# Patient Record
Sex: MALE | Race: OTHER | Employment: UNEMPLOYED | URBAN - METROPOLITAN AREA
[De-identification: names, ages, dates, MRNs, and addresses within clinical notes are randomized per-mention and may not be internally consistent; named-entity substitution may affect disease eponyms.]

---

## 2017-12-22 ENCOUNTER — HOSPITAL ENCOUNTER (INPATIENT)
Age: 42
LOS: 5 days | Discharge: HOME | DRG: 047 | End: 2017-12-27
Attending: EMERGENCY MEDICINE | Admitting: INTERNAL MEDICINE
Payer: COMMERCIAL

## 2017-12-22 ENCOUNTER — APPOINTMENT (OUTPATIENT)
Dept: MRI IMAGING | Age: 42
DRG: 047 | End: 2017-12-22
Payer: COMMERCIAL

## 2017-12-22 ENCOUNTER — APPOINTMENT (OUTPATIENT)
Dept: CT IMAGING | Age: 42
DRG: 047 | End: 2017-12-22
Payer: COMMERCIAL

## 2017-12-22 ENCOUNTER — APPOINTMENT (OUTPATIENT)
Dept: GENERAL RADIOLOGY | Age: 42
DRG: 047 | End: 2017-12-22
Payer: COMMERCIAL

## 2017-12-22 DIAGNOSIS — I63.9 CEREBROVASCULAR ACCIDENT (CVA), UNSPECIFIED MECHANISM (HCC): Primary | ICD-10-CM

## 2017-12-22 PROBLEM — R29.90 STROKE-LIKE SYMPTOMS: Status: ACTIVE | Noted: 2017-12-22

## 2017-12-22 LAB
ABSOLUTE EOS #: 0.12 K/UL (ref 0–0.44)
ABSOLUTE IMMATURE GRANULOCYTE: <0.03 K/UL (ref 0–0.3)
ABSOLUTE LYMPH #: 1.92 K/UL (ref 1.1–3.7)
ABSOLUTE MONO #: 0.49 K/UL (ref 0.1–1.2)
ACETAMINOPHEN LEVEL: <10 UG/ML (ref 10–30)
AMPHETAMINE SCREEN URINE: NEGATIVE
ANION GAP SERPL CALCULATED.3IONS-SCNC: 17 MMOL/L (ref 9–17)
BARBITURATE SCREEN URINE: NEGATIVE
BASOPHILS # BLD: 0 % (ref 0–2)
BASOPHILS ABSOLUTE: 0.03 K/UL (ref 0–0.2)
BENZODIAZEPINE SCREEN, URINE: NEGATIVE
BUN BLDV-MCNC: 7 MG/DL (ref 6–20)
BUN/CREAT BLD: ABNORMAL (ref 9–20)
BUPRENORPHINE URINE: ABNORMAL
CALCIUM SERPL-MCNC: 9.3 MG/DL (ref 8.6–10.4)
CANNABINOID SCREEN URINE: POSITIVE
CHLORIDE BLD-SCNC: 98 MMOL/L (ref 98–107)
CO2: 22 MMOL/L (ref 20–31)
COCAINE METABOLITE, URINE: NEGATIVE
CREAT SERPL-MCNC: 0.82 MG/DL (ref 0.7–1.2)
DIFFERENTIAL TYPE: ABNORMAL
EKG ATRIAL RATE: 108 BPM
EKG P AXIS: 58 DEGREES
EKG P-R INTERVAL: 144 MS
EKG Q-T INTERVAL: 336 MS
EKG QRS DURATION: 78 MS
EKG QTC CALCULATION (BAZETT): 450 MS
EKG R AXIS: 60 DEGREES
EKG T AXIS: 47 DEGREES
EKG VENTRICULAR RATE: 108 BPM
EOSINOPHILS RELATIVE PERCENT: 2 % (ref 1–4)
ETHANOL PERCENT: <0.01 %
ETHANOL: <10 MG/DL
GFR AFRICAN AMERICAN: >60 ML/MIN
GFR NON-AFRICAN AMERICAN: >60 ML/MIN
GFR SERPL CREATININE-BSD FRML MDRD: ABNORMAL ML/MIN/{1.73_M2}
GFR SERPL CREATININE-BSD FRML MDRD: ABNORMAL ML/MIN/{1.73_M2}
GLUCOSE BLD-MCNC: 107 MG/DL (ref 70–99)
HCT VFR BLD CALC: 51.5 % (ref 40.7–50.3)
HEMOGLOBIN: 16.9 G/DL (ref 13–17)
IMMATURE GRANULOCYTES: 0 %
INR BLD: 0.9
LACTIC ACID, WHOLE BLOOD: 2.4 MMOL/L (ref 0.7–2.1)
LYMPHOCYTES # BLD: 28 % (ref 24–43)
MCH RBC QN AUTO: 28.7 PG (ref 25.2–33.5)
MCHC RBC AUTO-ENTMCNC: 32.8 G/DL (ref 28.4–34.8)
MCV RBC AUTO: 87.4 FL (ref 82.6–102.9)
MDMA URINE: ABNORMAL
METHADONE SCREEN, URINE: NEGATIVE
METHAMPHETAMINE, URINE: ABNORMAL
MONOCYTES # BLD: 7 % (ref 3–12)
OPIATES, URINE: NEGATIVE
OXYCODONE SCREEN URINE: NEGATIVE
PARTIAL THROMBOPLASTIN TIME: 18.3 SEC (ref 21.3–31.3)
PDW BLD-RTO: 12.4 % (ref 11.8–14.4)
PHENCYCLIDINE, URINE: NEGATIVE
PLATELET # BLD: ABNORMAL K/UL (ref 138–453)
PLATELET ESTIMATE: ABNORMAL
PLATELET, FLUORESCENCE: NORMAL K/UL (ref 138–453)
PLATELET, IMMATURE FRACTION: NORMAL % (ref 1.1–10.3)
PMV BLD AUTO: ABNORMAL FL (ref 8.1–13.5)
POTASSIUM SERPL-SCNC: 4.1 MMOL/L (ref 3.7–5.3)
PROPOXYPHENE, URINE: ABNORMAL
PROTHROMBIN TIME: 9.6 SEC (ref 9.4–12.6)
RBC # BLD: 5.89 M/UL (ref 4.21–5.77)
RBC # BLD: ABNORMAL 10*6/UL
SALICYLATE LEVEL: <1 MG/DL (ref 3–10)
SEG NEUTROPHILS: 62 % (ref 36–65)
SEGMENTED NEUTROPHILS ABSOLUTE COUNT: 4.23 K/UL (ref 1.5–8.1)
SODIUM BLD-SCNC: 137 MMOL/L (ref 135–144)
TEST INFORMATION: ABNORMAL
TOXIC TRICYCLIC SC,BLOOD: NEGATIVE
TRICYCLIC ANTIDEPRESSANTS, UR: ABNORMAL
TROPONIN INTERP: NORMAL
TROPONIN INTERP: NORMAL
TROPONIN T: <0.03 NG/ML
TROPONIN T: <0.03 NG/ML
WBC # BLD: 6.8 K/UL (ref 3.5–11.3)
WBC # BLD: ABNORMAL 10*3/UL

## 2017-12-22 PROCEDURE — 80307 DRUG TEST PRSMV CHEM ANLYZR: CPT

## 2017-12-22 PROCEDURE — 6360000004 HC RX CONTRAST MEDICATION: Performed by: EMERGENCY MEDICINE

## 2017-12-22 PROCEDURE — 70544 MR ANGIOGRAPHY HEAD W/O DYE: CPT

## 2017-12-22 PROCEDURE — 99285 EMERGENCY DEPT VISIT HI MDM: CPT

## 2017-12-22 PROCEDURE — 73130 X-RAY EXAM OF HAND: CPT

## 2017-12-22 PROCEDURE — 70551 MRI BRAIN STEM W/O DYE: CPT

## 2017-12-22 PROCEDURE — 85610 PROTHROMBIN TIME: CPT

## 2017-12-22 PROCEDURE — 70450 CT HEAD/BRAIN W/O DYE: CPT

## 2017-12-22 PROCEDURE — G0480 DRUG TEST DEF 1-7 CLASSES: HCPCS

## 2017-12-22 PROCEDURE — 99254 IP/OBS CNSLTJ NEW/EST MOD 60: CPT | Performed by: PSYCHIATRY & NEUROLOGY

## 2017-12-22 PROCEDURE — 85025 COMPLETE CBC W/AUTO DIFF WBC: CPT

## 2017-12-22 PROCEDURE — 6370000000 HC RX 637 (ALT 250 FOR IP): Performed by: EMERGENCY MEDICINE

## 2017-12-22 PROCEDURE — 93005 ELECTROCARDIOGRAM TRACING: CPT

## 2017-12-22 PROCEDURE — 70498 CT ANGIOGRAPHY NECK: CPT

## 2017-12-22 PROCEDURE — 83605 ASSAY OF LACTIC ACID: CPT

## 2017-12-22 PROCEDURE — 2580000003 HC RX 258: Performed by: EMERGENCY MEDICINE

## 2017-12-22 PROCEDURE — 6360000002 HC RX W HCPCS: Performed by: EMERGENCY MEDICINE

## 2017-12-22 PROCEDURE — 2000000003 HC NEURO ICU R&B

## 2017-12-22 PROCEDURE — 87641 MR-STAPH DNA AMP PROBE: CPT

## 2017-12-22 PROCEDURE — 80048 BASIC METABOLIC PNL TOTAL CA: CPT

## 2017-12-22 PROCEDURE — 85730 THROMBOPLASTIN TIME PARTIAL: CPT

## 2017-12-22 PROCEDURE — 70496 CT ANGIOGRAPHY HEAD: CPT

## 2017-12-22 PROCEDURE — 84484 ASSAY OF TROPONIN QUANT: CPT

## 2017-12-22 RX ORDER — 0.9 % SODIUM CHLORIDE 0.9 %
1000 INTRAVENOUS SOLUTION INTRAVENOUS ONCE
Status: COMPLETED | OUTPATIENT
Start: 2017-12-22 | End: 2017-12-22

## 2017-12-22 RX ORDER — SODIUM CHLORIDE 0.9 % (FLUSH) 0.9 %
10 SYRINGE (ML) INJECTION PRN
Status: DISCONTINUED | OUTPATIENT
Start: 2017-12-22 | End: 2017-12-27 | Stop reason: HOSPADM

## 2017-12-22 RX ORDER — SODIUM CHLORIDE 9 MG/ML
INJECTION, SOLUTION INTRAVENOUS CONTINUOUS
Status: DISCONTINUED | OUTPATIENT
Start: 2017-12-22 | End: 2017-12-24

## 2017-12-22 RX ORDER — CLOPIDOGREL 300 MG/1
300 TABLET, FILM COATED ORAL ONCE
Status: COMPLETED | OUTPATIENT
Start: 2017-12-22 | End: 2017-12-22

## 2017-12-22 RX ORDER — ASPIRIN 81 MG/1
324 TABLET, CHEWABLE ORAL ONCE
Status: COMPLETED | OUTPATIENT
Start: 2017-12-22 | End: 2017-12-22

## 2017-12-22 RX ORDER — SODIUM CHLORIDE 0.9 % (FLUSH) 0.9 %
10 SYRINGE (ML) INJECTION EVERY 12 HOURS SCHEDULED
Status: DISCONTINUED | OUTPATIENT
Start: 2017-12-22 | End: 2017-12-27 | Stop reason: HOSPADM

## 2017-12-22 RX ORDER — ACETAMINOPHEN 325 MG/1
650 TABLET ORAL EVERY 4 HOURS PRN
Status: DISCONTINUED | OUTPATIENT
Start: 2017-12-22 | End: 2017-12-27 | Stop reason: HOSPADM

## 2017-12-22 RX ORDER — ONDANSETRON 2 MG/ML
4 INJECTION INTRAMUSCULAR; INTRAVENOUS EVERY 6 HOURS PRN
Status: DISCONTINUED | OUTPATIENT
Start: 2017-12-22 | End: 2017-12-27 | Stop reason: HOSPADM

## 2017-12-22 RX ORDER — KETOROLAC TROMETHAMINE 30 MG/ML
30 INJECTION, SOLUTION INTRAMUSCULAR; INTRAVENOUS ONCE
Status: COMPLETED | OUTPATIENT
Start: 2017-12-22 | End: 2017-12-22

## 2017-12-22 RX ADMIN — CLOPIDOGREL BISULFATE 300 MG: 300 TABLET, FILM COATED ORAL at 17:41

## 2017-12-22 RX ADMIN — Medication 10 ML: at 20:32

## 2017-12-22 RX ADMIN — IOPAMIDOL 90 ML: 755 INJECTION, SOLUTION INTRAVENOUS at 16:27

## 2017-12-22 RX ADMIN — KETOROLAC TROMETHAMINE 30 MG: 30 INJECTION, SOLUTION INTRAMUSCULAR at 22:40

## 2017-12-22 RX ADMIN — ACETAMINOPHEN 650 MG: 325 TABLET ORAL at 21:09

## 2017-12-22 RX ADMIN — SODIUM CHLORIDE: 9 INJECTION, SOLUTION INTRAVENOUS at 20:31

## 2017-12-22 RX ADMIN — SODIUM CHLORIDE 1000 ML: 9 INJECTION, SOLUTION INTRAVENOUS at 16:32

## 2017-12-22 RX ADMIN — ASPIRIN 81 MG 324 MG: 81 TABLET ORAL at 17:41

## 2017-12-22 ASSESSMENT — ENCOUNTER SYMPTOMS
NAUSEA: 0
SORE THROAT: 0
SINUS PRESSURE: 0
SINUS PAIN: 0
PHOTOPHOBIA: 0
SHORTNESS OF BREATH: 0
WHEEZING: 0
DIARRHEA: 0
VOMITING: 0
STRIDOR: 0
ABDOMINAL PAIN: 0
COUGH: 0

## 2017-12-22 ASSESSMENT — PAIN SCALES - GENERAL
PAINLEVEL_OUTOF10: 10
PAINLEVEL_OUTOF10: 10

## 2017-12-22 NOTE — FLOWSHEET NOTE
12/22/17 1715   Encounter Summary   Services provided to: Patient   Referral/Consult From: Multi-disciplinary team   Support System Significant other   Continue Visiting (12/22/2017)   Complexity of Encounter High   Length of Encounter 15 minutes   Routine   Type Initial   Assessment Calm; Approachable; Anxious  (Weak)   Intervention Active listening;Explored feelings, thoughts, concerns;Sustaining presence/ Ministry of presence   Outcome Acceptance;Comfort  (Called his significant other)       [facility] [place] [address]     Emergency/Trauma Note    PATIENT NAME: Timur Kelly    Shift date: 12/22/2017  Shift day: Friday   Shift # 2    Room # 17/17   Name: Timur Kelly            Age: 36 y.o. Gender: male          [employer]: [id number] of [employer]ism: Unknown    Trauma/Incident type: Stroke Consult  Admit Date & Time: 12/22/2017  3:35 PM  TRAUMA NAME:       PATIENT/EVENT DESCRIPTION:  Timur Kelly is a 36 y.o. male who arrived on his own initiative. He was not transported on any helicopter or ambulance. Pt to be admitted to 17/17. SPIRITUAL ASSESSMENT/INTERVENTION:  Cassandra Rizvi was notified to come to ED in response to a Stroke Consult. This patient was visited, and he seemed to be confused. When  asked him if there were anyone he could contact, his response was to call a significant other named Tierney Carrillo. This person was contacted at 5:21PM, and she responded by saying that she would be arriving in about an hour. The patient seemed to be confused. When asked to give the significant other's telephone number, he had to repeat the number three times before he agreed that it was the correct telephone number. PATIENT BELONGINGS:  With patient    ANY BELONGINGS OF SIGNIFICANT VALUE NOTED:  None    REGISTRATION STAFF NOTIFIED? Yes      WHAT IS YOUR SPIRITUAL CARE PLAN FOR THIS PATIENT?:     This patient will be provided any needed follow-up.   He was informed that he could notify the ED nurses to page the Spiritual Care Staff    Electronically signed by Chaplain Jaz, on 12/22/2017 at 5:30 PM.

## 2017-12-22 NOTE — ED NOTES
Pt resting on cart, respirations are equal and nonlabored, no distress noted. Pt updated on poc and duration, continue to monitor.        200 Scot Poe RN  12/22/17 1941

## 2017-12-22 NOTE — CONSULTS
States that his RUE is numb to LT compared to the LUE. DATA:  CBC:   Lab Results   Component Value Date    WBC 6.8 12/22/2017    RBC 5.89 12/22/2017    HGB 16.9 12/22/2017    HCT 51.5 12/22/2017    MCV 87.4 12/22/2017    MCH 28.7 12/22/2017    MCHC 32.8 12/22/2017    RDW 12.4 12/22/2017    PLT See Reflexed IPF Result 12/22/2017    MPV NOT REPORTED 12/22/2017     CMP:    Lab Results   Component Value Date     12/22/2017    K 4.1 12/22/2017    CL 98 12/22/2017    CO2 22 12/22/2017    BUN 7 12/22/2017    CREATININE 0.82 12/22/2017    GFRAA >60 12/22/2017    LABGLOM >60 12/22/2017    GLUCOSE 107 12/22/2017    PROT 7.7 09/16/2013    LABALBU 4.2 09/16/2013    CALCIUM 9.3 12/22/2017    BILITOT 0.3 09/16/2013    ALKPHOS 114 09/16/2013    AST 23 09/16/2013    ALT 23 09/16/2013             IMPRESSION/RECOMMENDATIONS:      37 yo man presenting with a sense of light-headness with standing. He also endorses right hand incoordination for the past several months. CTA suggests several possible areas of abnormality including the proximal basilar where there may be a partial thrombus.     -->load with clopidogrel 300mg and aspirin 325mg  -->admit to neuro ICU  -->brain MRI to investigate for possible infarct  -->I have added a MRA brain to better characterize the basilar lesion and possible left PCA fenestration

## 2017-12-22 NOTE — ED PROVIDER NOTES
status: Current Every Day Smoker     Packs/day: 1.00     Types: Cigarettes    Smokeless tobacco: Never Used    Alcohol use No    Drug use: No    Sexual activity: Not Currently     Other Topics Concern    Not on file     Social History Narrative    No narrative on file       History reviewed. No pertinent family history. Allergies:  Review of patient's allergies indicates no known allergies. Home Medications:  Prior to Admission medications    Not on File       REVIEW OF SYSTEMS    (2-9 systems for level 4, 10 or more for level 5)      Review of Systems   Constitutional: Negative for activity change, appetite change, chills, diaphoresis, fatigue and fever. HENT: Negative for congestion, sinus pain, sinus pressure, sneezing and sore throat. Eyes: Positive for visual disturbance. Negative for photophobia. Respiratory: Negative for cough, shortness of breath, wheezing and stridor. Cardiovascular: Negative for chest pain and leg swelling. Gastrointestinal: Negative for abdominal pain, diarrhea, nausea and vomiting. Musculoskeletal: Negative for arthralgias and myalgias. Skin: Negative for rash and wound. Neurological: Positive for speech difficulty. Psychiatric/Behavioral: Negative for agitation, behavioral problems and confusion. PHYSICAL EXAM   (up to 7 for level 4, 8 or more for level 5)      INITIAL VITALS:   BP (!) 152/98   Pulse 111   Temp 98.7 °F (37.1 °C) (Oral)   Resp 18   SpO2 100%     Physical Exam   Constitutional: He is oriented to person, place, and time. He appears well-developed and well-nourished. No distress. HENT:   Head: Normocephalic and atraumatic. Eyes: Conjunctivae and EOM are normal. Pupils are equal, round, and reactive to light. Neck: Neck supple. Cardiovascular: Regular rhythm and normal heart sounds. Tachycardia present. Pulmonary/Chest: Effort normal and breath sounds normal. No respiratory distress. He has no wheezes. He has no rales. Abdominal: Soft. He exhibits no distension. There is no tenderness. There is no rebound and no guarding. Neurological: He is alert and oriented to person, place, and time. He has normal strength. No cranial nerve deficit or sensory deficit. Coordination normal. GCS eye subscore is 4. GCS verbal subscore is 5. GCS motor subscore is 6. Patient has no obvious focal neuro deficits with intact perception of the lower and upper extremities. Patient has intact range of motion in all 4 sugars. Patient's strength is 4-4 normal 4 extremities. There is no obvious sensory deficit. Patient has good midline tongue protrusion, good extraocular motion, strong shoulder shrug bilaterally, patient has intact sensation throughout the face. Hearing is grossly intact bilaterally no focal neuro deficits. Patient speech does not appear to be slurred on my exam.   Skin: He is not diaphoretic.        DIFFERENTIAL  DIAGNOSIS     PLAN (LABS / IMAGING / EKG):  Orders Placed This Encounter   Procedures    CT Head WO Contrast    CTA HEAD W CONTRAST    CTA NECK W CONTRAST    MRI Brain WO Contrast    MRA HEAD W CONTRAST    CBC WITH AUTO DIFFERENTIAL    BASIC METABOLIC PANEL    Protime-INR    APTT    Lactic Acid, Whole Blood    TOX SCR, BLD, ED    Urine Drug Screen    Troponin    Immature Platelet Fraction    Troponin    Inpatient consult to Neurology    EKG 12 Lead    PATIENT STATUS (FROM ED OR OR/PROCEDURAL) Inpatient       MEDICATIONS ORDERED:  Orders Placed This Encounter   Medications    0.9 % sodium chloride bolus    iopamidol (ISOVUE-370) 76 % injection 90 mL    clopidogrel (PLAVIX) tablet 300 mg    aspirin chewable tablet 324 mg       DDX: CVA, TIA,     DIAGNOSTIC RESULTS / EMERGENCY DEPARTMENT COURSE / MDM     LABS:  Results for orders placed or performed during the hospital encounter of 12/22/17   CBC WITH AUTO DIFFERENTIAL   Result Value Ref Range    WBC 6.8 3.5 - 11.3 k/uL    RBC 5.89 (H) 4.21 - 5.77 m/uL Immature Fraction NOT REPORTED 1.1 - 10.3 %    Platelet, Fluorescence Platelet clumps present, count appears adequate. 138 - 453 k/uL   Troponin   Result Value Ref Range    Troponin T <0.03 <0.03 ng/mL    Troponin Interp             IMPRESSION: lactic acidosis    RADIOLOGY:    Ct Head Wo Contrast    Result Date: 12/22/2017  EXAMINATION: CT OF THE HEAD WITHOUT CONTRAST  12/22/2017 4:27 pm TECHNIQUE: CT of the head was performed without the administration of intravenous contrast. Dose modulation, iterative reconstruction, and/or weight based adjustment of the mA/kV was utilized to reduce the radiation dose to as low as reasonably achievable. COMPARISON: January 12, 2008. HISTORY: ORDERING SYSTEM PROVIDED HISTORY: CVA FINDINGS: BRAIN/VENTRICLES: There are several well of circumscribed small low attenuation lesions in the left caudate nucleus extending posteriorly superiorly in the centrum semiovale with dilatation of the ipsilateral ventricle especially of the frontal horn, could have been sequela of remote embolic disease. There is an ill-defined hypodensity in the left midbrain which could represent in hands evolving lacunar infarction, further evaluation with MRI would be beneficial. There is no acute intracranial hemorrhage, mass effect or midline shift. No abnormal extra-axial fluid collection. The gray-white differentiation is maintained without evidence of an acute infarct. There is no evidence of hydrocephalus. ORBITS: The visualized portion of the orbits demonstrate no acute abnormality. SINUSES:  The mastoid air cells demonstrate no acute abnormality. Deviation of bony nasal septal noted to the right with bilateral jack bullosa larger on the left side. Minimal mucoperiosteal disease of maxillary sinuses as well as anterior ethmoidal cells noted likely from chronic sinus disease. The frontal sinuses are non pneumatized. The sphenoidal sinuses are clear.  SOFT TISSUES/SKULL:  No acute abnormality of the visualized skull or soft tissues. Multiple well-circumscribed small low-attenuation lesions in the left caudate nucleus extending posterior superiorly to the centrum semi ovale with dilatation of the ipsilateral ventricle, this likely a sequela of remote embolic disease. In addition, there is slightly ill-defined hypodensity in the left midbrain which could potentially represent a new or evolving lacunar infarction, further evaluation with MRI would help in better characterization. Cta Neck W Contrast    Result Date: 12/22/2017  EXAMINATION: CTA OF THE HEAD WITH CONTRAST; CTA OF THE NECK  12/22/2017 4:40 pm: TECHNIQUE: CTA of the head/brain was performed with the administration of intravenous contrast. Multiplanar reformatted images are provided for review. MIP images are provided for review. Dose modulation, iterative reconstruction, and/or weight based adjustment of the mA/kV was utilized to reduce the radiation dose to as low as reasonably achievable.; CTA of the neck was performed with the administration of intravenous contrast. Multiplanar reformatted images are provided for review. MIP images are provided for review. Stenosis of the internal carotid arteries measured using NASCET criteria. Dose modulation, iterative reconstruction, and/or weight based adjustment of the mA/kV was utilized to reduce the radiation dose to as low as reasonably achievable. COMPARISON: CT of the head from today HISTORY: ORDERING SYSTEM PROVIDED HISTORY: CVA FINDINGS: ANTERIOR CIRCULATION: The internal carotid arteries are normal in course and caliber without focal stenosis. The anterior cerebral and middle cerebral arteries demonstrate no focal stenosis. POSTERIOR CIRCULATION: There appears there appears to be a filling defect and severe narrowing of the proximal basilar artery.   The distal vertebral arteries appear normal.  The proximal superior cerebellar arteries are normal.  The posterior cerebral arteries are normal. TO:  No follow-up provider specified.     DISCHARGE MEDICATIONS:  New Prescriptions    No medications on file       Randall Ulrich MD  Emergency Medicine Resident    (Please note that portions of this note were completed with a voice recognition program.  Efforts were made to edit the dictations but occasionally words are mis-transcribed.)       Randall Ulrich MD  Resident  12/22/17 3067

## 2017-12-22 NOTE — ED NOTES
Pt has loss of IV access in CT. CT technician Removed old IV and placed a new one.       [address] Scot Poe RN  12/22/17 2194

## 2017-12-22 NOTE — ED PROVIDER NOTES
[facility]     Emergency Department     Faculty Attestation    I performed a history and physical examination of the patient and discussed management with the resident. I reviewed the residents note and agree with the documented findings and plan of care. Any areas of disagreement are noted on the chart. I was personally present for the key portions of any procedures. I have documented in the chart those procedures where I was not present during the key portions. I have reviewed the emergency nurses triage note. I agree with the chief complaint, past medical history, past surgical history, allergies, medications, social and family history as documented unless otherwise noted below. For Physician Assistant/ Nurse Practitioner cases/documentation I have personally evaluated this patient and have completed at least one if not all key elements of the E/M (history, physical exam, and MDM). Additional findings are as noted. Neuro intact this time, possible TIA earlier today and several months ago.       Heart regular rate and rhythmEKG Interpretation    Interpreted by me    Rhythm: normal sinus   Rate: 108  Axis: normal  Ectopy: none  Conduction: normal  ST Segments: no acute change  T Waves: no acute change  Q Waves: none    Clinical Impression: no acute changes and normal EKG Except for sinus tachycardia    Celestino Severino MD  Attending Emergency  Physician              Anju Hamlin MD  12/22/17 6251

## 2017-12-22 NOTE — H&P
Neuro ICU History & Physical    Patient Name: Daniel Villalba  Patient : 1975  Room/Bed:   Allergies: No Known Allergies  Problem List:   Patient Active Problem List   Diagnosis    Stroke-like symptoms       CHIEF COMPLAINT     Dizziness and stroke-like symptoms     HPI    History Obtained From: Patient and EMR    The patient is a 36 y.o. male presented with complaints of difficulty speaking with some slurred speech and nonspecific vision changes the beginning this morning after waking up. Patient states that he went to bed last night around 10:50 PM had no symptoms at that time. Patient states that the vision changes or intermittent episodes the dysarthria/typically speaking. Patient's past medical history is significant for CVA approximately 3-4 months ago, however patient states that he never went to the hospital to get evaluated. Patient states that since that time he has had difficulty with right upper extremity fine motor movements. He currently denies any symptoms including no sensory or motor changes/wobbliness. Patient also denies any recent fevers/chills, nausea/vomiting difficulty breathing/chest pain, abdominal pain or any  symptoms. He does have a significant past medical history of hepatitis and per significant other does have a history of substance abuse. Admitted to ICU From: ED  Reason for ICU Admission: Possible Intervention pending MRI       PATIENT HISTORY   Past Medical History:    History reviewed. No pertinent past medical history. Past Surgical History:    History reviewed. No pertinent surgical history. Social History:   Social History     Social History    Marital status:      Spouse name: N/A    Number of children: N/A    Years of education: N/A     Occupational History    Not on file.      Social History Main Topics    Smoking status: Current Every Day Smoker     Packs/day: 1.00     Types: Cigarettes    Smokeless tobacco: Never Used   [place] Alcohol use No    Drug use: No    Sexual activity: Not Currently     Other Topics Concern    Not on file     Social History Narrative    No narrative on file       Family History:   History reviewed. No pertinent family history. Allergies:    Review of patient's allergies indicates no known allergies. Medications Prior to Admission:    Not in a hospital admission. Current Medications:  No current facility-administered medications for this encounter. REVIEW OF SYSTEMS     CONSTITUTIONAL: negative for fatigue and malaise   EYES: positive for double vision and photophobia    HEENT: negative for tinnitus and sore throat   RESPIRATORY: negative for cough, shortness of breath   CARDIOVASCULAR: negative for chest pain, palpitations, or syncope   GASTROINTESTINAL: negative for abdominal pain, nausea, vomiting, diarrhea, or constipation    GENITOURINARY: negative for incontinence or retention    MUSCULOSKELETAL: negative for neck or back pain, negative for extremity pain   NEUROLOGICAL: Negative for seizures, headaches, +weakness, numbness, confusion, aphasia, + dysarthria    PSYCHIATRIC: negative for agitation, hallucination, SI/HI   SKIN Negative for spontaneous contusions, rashes, or lesions      PHYSICAL EXAM:     BP (!) 126/98   Pulse 111   Temp 98.7 °F (37.1 °C) (Oral)   Resp 18   SpO2 97%       PHYSICAL EXAM:  CONSTITUTIONAL:  Well developed, well nourished, alert and oriented x 3, in no acute distress. GCS 15. Nontoxic. No dysarthria. No aphasia.    HEAD:  normocephalic, atraumatic    EYES:  PERRLA, EOMI.   ENT:  moist mucous membranes   LUNGS:  Equal air entry bilaterally   CARDIOVASCULAR:  normal s1 / s2   ABDOMEN:  Soft, no rigidity   NECK supple, symmetric, no midline tenderness to palpation    BACK without midline tenderness, step-offs or deformities    EXTREMITIES Normal ROM with no deformities   NEUROLOGIC:  Mental Status:  A & O x3,awake             Cranial Nerves:    cranial nerves II-XII Neck W Contrast    Result Date: 12/22/2017  EXAMINATION: CTA OF THE HEAD WITH CONTRAST; CTA OF THE NECK  12/22/2017 4:40 pm: TECHNIQUE: CTA of the head/brain was performed with the administration of intravenous contrast. Multiplanar reformatted images are provided for review. MIP images are provided for review. Dose modulation, iterative reconstruction, and/or weight based adjustment of the mA/kV was utilized to reduce the radiation dose to as low as reasonably achievable.; CTA of the neck was performed with the administration of intravenous contrast. Multiplanar reformatted images are provided for review. MIP images are provided for review. Stenosis of the internal carotid arteries measured using NASCET criteria. Dose modulation, iterative reconstruction, and/or weight based adjustment of the mA/kV was utilized to reduce the radiation dose to as low as reasonably achievable. COMPARISON: CT of the head from today HISTORY: ORDERING SYSTEM PROVIDED HISTORY: CVA FINDINGS: ANTERIOR CIRCULATION: The internal carotid arteries are normal in course and caliber without focal stenosis. The anterior cerebral and middle cerebral arteries demonstrate no focal stenosis. POSTERIOR CIRCULATION: There appears there appears to be a filling defect and severe narrowing of the proximal basilar artery. The distal vertebral arteries appear normal.  The proximal superior cerebellar arteries are normal.  The posterior cerebral arteries are normal.  In the posterior communicating arteries are present bilaterally. ANEURYSM: No intracranial aneurysm is seen. BRAIN: No mass effect or midline shift. No abnormal extra-axial fluid collection. The gray-white differentiation appears grossly maintained. Possible left pontine infarct appears less conspicuous. Old lacunar infarction noted in the basal ganglia on the left. Incidental note is made of a bovine arch. The origins of the great vessels are otherwise unremarkable.   There is plaque noted PROVIDED HISTORY: CVA FINDINGS: ANTERIOR CIRCULATION: The internal carotid arteries are normal in course and caliber without focal stenosis. The anterior cerebral and middle cerebral arteries demonstrate no focal stenosis. POSTERIOR CIRCULATION: There appears there appears to be a filling defect and severe narrowing of the proximal basilar artery. The distal vertebral arteries appear normal.  The proximal superior cerebellar arteries are normal.  The posterior cerebral arteries are normal.  In the posterior communicating arteries are present bilaterally. ANEURYSM: No intracranial aneurysm is seen. BRAIN: No mass effect or midline shift. No abnormal extra-axial fluid collection. The gray-white differentiation appears grossly maintained. Possible left pontine infarct appears less conspicuous. Old lacunar infarction noted in the basal ganglia on the left. Incidental note is made of a bovine arch. The origins of the great vessels are otherwise unremarkable. There is plaque noted along the aortic arch. There is a small amount of mixed plaque at the carotid bulb on the left with no significant stenosis. The common, external, and internal carotid arteries are otherwise normal in course and caliber bilaterally. There is small amount of focal plaque at the origin of the right vertebral artery with possible moderate stenosis. The vertebral arteries otherwise appear normal in course and caliber bilaterally. 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 Probable acute infarct left vern brian. Possible moderate stenosis at the origin of the right vertebral artery due to mixed plaque. RECOMMENDATIONS: Sensitivity is limited without curved multi planar and 3D reformats. An addendum will be performed when these are available. The findings were sent to the Radiology Results [address] at 4:58 pm on 12/22/2017to be communicated to a licensed caregiver.   Case discussed with the emergency

## 2017-12-23 PROBLEM — Z86.73 HX OF TIA (TRANSIENT ISCHEMIC ATTACK) AND STROKE: Status: ACTIVE | Noted: 2017-12-23

## 2017-12-23 PROBLEM — E78.5 HYPERLIPIDEMIA: Status: ACTIVE | Noted: 2017-12-23

## 2017-12-23 PROBLEM — F12.10 MARIJUANA ABUSE: Status: ACTIVE | Noted: 2017-12-23

## 2017-12-23 LAB
ABSOLUTE EOS #: 0.17 K/UL (ref 0–0.44)
ABSOLUTE IMMATURE GRANULOCYTE: <0.03 K/UL (ref 0–0.3)
ABSOLUTE LYMPH #: 1.43 K/UL (ref 1.1–3.7)
ABSOLUTE MONO #: 0.42 K/UL (ref 0.1–1.2)
ANION GAP SERPL CALCULATED.3IONS-SCNC: 15 MMOL/L (ref 9–17)
BASOPHILS # BLD: 1 % (ref 0–2)
BASOPHILS ABSOLUTE: 0.04 K/UL (ref 0–0.2)
BUN BLDV-MCNC: 7 MG/DL (ref 6–20)
BUN/CREAT BLD: ABNORMAL (ref 9–20)
CALCIUM SERPL-MCNC: 8.3 MG/DL (ref 8.6–10.4)
CHLORIDE BLD-SCNC: 107 MMOL/L (ref 98–107)
CHOLESTEROL/HDL RATIO: 7.7
CHOLESTEROL: 207 MG/DL
CO2: 19 MMOL/L (ref 20–31)
CREAT SERPL-MCNC: 0.83 MG/DL (ref 0.7–1.2)
DIFFERENTIAL TYPE: ABNORMAL
EOSINOPHILS RELATIVE PERCENT: 2 % (ref 1–4)
GFR AFRICAN AMERICAN: >60 ML/MIN
GFR NON-AFRICAN AMERICAN: >60 ML/MIN
GFR SERPL CREATININE-BSD FRML MDRD: ABNORMAL ML/MIN/{1.73_M2}
GFR SERPL CREATININE-BSD FRML MDRD: ABNORMAL ML/MIN/{1.73_M2}
GLUCOSE BLD-MCNC: 164 MG/DL (ref 70–99)
HCT VFR BLD CALC: 44.9 % (ref 40.7–50.3)
HDLC SERPL-MCNC: 27 MG/DL
HEMOGLOBIN: 14.5 G/DL (ref 13–17)
IMMATURE GRANULOCYTES: 0 %
LACTIC ACID, WHOLE BLOOD: 3 MMOL/L (ref 0.7–2.1)
LDL CHOLESTEROL: 158 MG/DL (ref 0–130)
LYMPHOCYTES # BLD: 17 % (ref 24–43)
MCH RBC QN AUTO: 28.9 PG (ref 25.2–33.5)
MCHC RBC AUTO-ENTMCNC: 32.3 G/DL (ref 28.4–34.8)
MCV RBC AUTO: 89.4 FL (ref 82.6–102.9)
MONOCYTES # BLD: 5 % (ref 3–12)
MRSA, DNA, NASAL: NORMAL
PDW BLD-RTO: 12.4 % (ref 11.8–14.4)
PLATELET # BLD: 189 K/UL (ref 138–453)
PLATELET ESTIMATE: ABNORMAL
PMV BLD AUTO: 9.1 FL (ref 8.1–13.5)
POTASSIUM SERPL-SCNC: 3.7 MMOL/L (ref 3.7–5.3)
RBC # BLD: 5.02 M/UL (ref 4.21–5.77)
RBC # BLD: ABNORMAL 10*6/UL
SEG NEUTROPHILS: 75 % (ref 36–65)
SEGMENTED NEUTROPHILS ABSOLUTE COUNT: 6.48 K/UL (ref 1.5–8.1)
SODIUM BLD-SCNC: 141 MMOL/L (ref 135–144)
SPECIMEN DESCRIPTION: NORMAL
TRIGL SERPL-MCNC: 110 MG/DL
VLDLC SERPL CALC-MCNC: ABNORMAL MG/DL (ref 1–30)
WBC # BLD: 8.6 K/UL (ref 3.5–11.3)
WBC # BLD: ABNORMAL 10*3/UL

## 2017-12-23 PROCEDURE — 86147 CARDIOLIPIN ANTIBODY EA IG: CPT

## 2017-12-23 PROCEDURE — 94762 N-INVAS EAR/PLS OXIMTRY CONT: CPT

## 2017-12-23 PROCEDURE — 85025 COMPLETE CBC W/AUTO DIFF WBC: CPT

## 2017-12-23 PROCEDURE — 36415 COLL VENOUS BLD VENIPUNCTURE: CPT

## 2017-12-23 PROCEDURE — 83605 ASSAY OF LACTIC ACID: CPT

## 2017-12-23 PROCEDURE — 6370000000 HC RX 637 (ALT 250 FOR IP): Performed by: EMERGENCY MEDICINE

## 2017-12-23 PROCEDURE — 6360000002 HC RX W HCPCS: Performed by: INTERNAL MEDICINE

## 2017-12-23 PROCEDURE — 6370000000 HC RX 637 (ALT 250 FOR IP): Performed by: STUDENT IN AN ORGANIZED HEALTH CARE EDUCATION/TRAINING PROGRAM

## 2017-12-23 PROCEDURE — 80061 LIPID PANEL: CPT

## 2017-12-23 PROCEDURE — 2060000000 HC ICU INTERMEDIATE R&B

## 2017-12-23 PROCEDURE — 83036 HEMOGLOBIN GLYCOSYLATED A1C: CPT

## 2017-12-23 PROCEDURE — 85302 CLOT INHIBIT PROT C ANTIGEN: CPT

## 2017-12-23 PROCEDURE — 81241 F5 GENE: CPT

## 2017-12-23 PROCEDURE — 99233 SBSQ HOSP IP/OBS HIGH 50: CPT | Performed by: NEUROLOGICAL SURGERY

## 2017-12-23 PROCEDURE — 86146 BETA-2 GLYCOPROTEIN ANTIBODY: CPT

## 2017-12-23 PROCEDURE — 80048 BASIC METABOLIC PNL TOTAL CA: CPT

## 2017-12-23 PROCEDURE — 85305 CLOT INHIBIT PROT S TOTAL: CPT

## 2017-12-23 PROCEDURE — 6370000000 HC RX 637 (ALT 250 FOR IP): Performed by: NURSE PRACTITIONER

## 2017-12-23 PROCEDURE — 99233 SBSQ HOSP IP/OBS HIGH 50: CPT | Performed by: INTERNAL MEDICINE

## 2017-12-23 PROCEDURE — 2580000003 HC RX 258: Performed by: EMERGENCY MEDICINE

## 2017-12-23 PROCEDURE — 99255 IP/OBS CONSLTJ NEW/EST HI 80: CPT | Performed by: PSYCHIATRY & NEUROLOGY

## 2017-12-23 PROCEDURE — 81240 F2 GENE: CPT

## 2017-12-23 RX ORDER — MORPHINE SULFATE 2 MG/ML
1 INJECTION, SOLUTION INTRAMUSCULAR; INTRAVENOUS ONCE
Status: COMPLETED | OUTPATIENT
Start: 2017-12-23 | End: 2017-12-23

## 2017-12-23 RX ORDER — MORPHINE SULFATE 2 MG/ML
1 INJECTION, SOLUTION INTRAMUSCULAR; INTRAVENOUS EVERY 4 HOURS PRN
Status: DISCONTINUED | OUTPATIENT
Start: 2017-12-23 | End: 2017-12-23

## 2017-12-23 RX ORDER — CLOPIDOGREL BISULFATE 75 MG/1
75 TABLET ORAL DAILY
Status: DISCONTINUED | OUTPATIENT
Start: 2017-12-23 | End: 2017-12-27 | Stop reason: HOSPADM

## 2017-12-23 RX ORDER — ATORVASTATIN CALCIUM 80 MG/1
80 TABLET, FILM COATED ORAL NIGHTLY
Status: DISCONTINUED | OUTPATIENT
Start: 2017-12-23 | End: 2017-12-27 | Stop reason: HOSPADM

## 2017-12-23 RX ORDER — ATORVASTATIN CALCIUM 40 MG/1
40 TABLET, FILM COATED ORAL NIGHTLY
Status: DISCONTINUED | OUTPATIENT
Start: 2017-12-23 | End: 2017-12-23

## 2017-12-23 RX ORDER — ASPIRIN 81 MG/1
81 TABLET, CHEWABLE ORAL DAILY
Status: DISCONTINUED | OUTPATIENT
Start: 2017-12-23 | End: 2017-12-27 | Stop reason: HOSPADM

## 2017-12-23 RX ADMIN — ASPIRIN 81 MG 81 MG: 81 TABLET ORAL at 16:18

## 2017-12-23 RX ADMIN — Medication 10 ML: at 21:01

## 2017-12-23 RX ADMIN — MORPHINE SULFATE 1 MG: 2 INJECTION, SOLUTION INTRAMUSCULAR; INTRAVENOUS at 19:12

## 2017-12-23 RX ADMIN — ACETAMINOPHEN 650 MG: 325 TABLET ORAL at 22:02

## 2017-12-23 RX ADMIN — ACETAMINOPHEN 650 MG: 325 TABLET ORAL at 16:20

## 2017-12-23 RX ADMIN — Medication 10 ML: at 09:13

## 2017-12-23 RX ADMIN — CLOPIDOGREL 75 MG: 75 TABLET, FILM COATED ORAL at 16:45

## 2017-12-23 RX ADMIN — ATORVASTATIN CALCIUM 80 MG: 80 TABLET, FILM COATED ORAL at 21:01

## 2017-12-23 ASSESSMENT — PAIN SCALES - GENERAL
PAINLEVEL_OUTOF10: 8
PAINLEVEL_OUTOF10: 0
PAINLEVEL_OUTOF10: 10
PAINLEVEL_OUTOF10: 6

## 2017-12-23 NOTE — CONSULTS
NEUROLOGY INPATIENT CONSULT NOTE    12/23/2017         Moraima Au is a  36 y.o. male admitted on 12/22/2017 with  Stroke-like symptoms [R29.90]      History is obtained mostly from the patient and the medical record and from the caregivers. Chart is reviewed and patient is examined. Briefly, this is a  36 y.o. male admitted on 12/22/2017 with complaints of difficulty speaking with some slurred speech and nonspecific vision changes the beginning this morning after waking up. Patient states that he went to bed last night around 10:50 PM had no symptoms at that time. Patient states he has intermittent vision changes and intermittent episodes of dysarthria / trouble speaking. He currently denies any symptoms including no sensory or motor changes/wobbliness. Patient's past medical history is significant for self-reported possible stroke approximately 3-4 months ago, however patient states that he never went to the hospital to get evaluated. Patient states that since that time he has had difficulty with right upper extremity fine motor movements. He states that for the past several months he has had coordination problems with his right hand, unable to write his name. He states that he is currently on no medication and denies illicit drug use, although his cousin who is beside suspects he has recently been using heroin. Patient was loaded with 300 mg clopidogrel and aspirin 325 mg. Although CT scan suggested acute pontine infarction, this was not borne out by brain MRI which described only subtle T2 lengthening in left vern-brian which may represent chronic ischemia. 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   He does have a significant past medical history of hepatitis and per significant other does have a history of substance abuse.  Patient also denies any recent fevers/chills, nausea/vomiting difficulty breathing/chest pain, abdominal pain or any  symptoms. LDL is pending. Random blood glucose borderline elevated; A1c pending. Urine tox screen positive for cannabinoids. Alcohol level less than 10. EKG sinus tachycardia rate 108 with no acute ST-T changes.            No current facility-administered medications on file prior to encounter. No current outpatient prescriptions on file prior to encounter. Allergies: Moraima Au has No Known Allergies. History reviewed. No pertinent past medical history. History reviewed. No pertinent surgical history. Social History: Moraima Au  reports that he has been smoking Cigarettes. He has been smoking about 1.00 pack per day. He has never used smokeless tobacco. He reports that he does not drink alcohol or use drugs. History reviewed. No pertinent family history. Current Medications:     sodium chloride flush  10 mL Intravenous 2 times per day     PRN Meds include: sodium chloride flush, acetaminophen, magnesium hydroxide, ondansetron    ROS:   Constitutional Negative for fever and chills   HEENT Negative for ear discharge, ear pain, nosebleed   Eyes Negative for photophobia, pain and discharge   Respiratory Negative for hemoptysis and sputum   Cardiovascular Negative for orthopnea, claudication and PND   Gastrointestinal Negative for abdominal pain, diarrhea, blood in stool   Musculoskeletal Negative for joint pain, negative for myalgia   Skin Negative for rash or itching   Endo/heme/allergies Negative for polydipsia, environmental allergy   Psychiatric Negative for suicidal ideation.   Patient is not anxious           Objective:   BP (!) 135/116   Pulse 71   Temp 98 °F (36.7 °C)   Resp 18   Ht 5' 8\" (1.727 m)   Wt 162 lb 4.1 oz (73.6 kg)   SpO2 99%   BMI 24.67 kg/m²     Blood pressure range: Systolic (12PSE), QDK:565 , Min:101 , VDD:280   ; Diastolic (95FMA), QUW:52, Min:52, Reason for exam:->HAND PAIN FINDINGS: There is no evidence of acute fracture. There is normal alignment. No acute joint abnormality. No focal osseous lesion. No focal soft tissue abnormality. No acute osseous abnormality. Nonspecific soft tissue calcification along the ulnar aspect of the hand at the level of the carpal bones. Ct Head Wo Contrast    Result Date: 12/22/2017  EXAMINATION: CT OF THE HEAD WITHOUT CONTRAST  12/22/2017 4:27 pm TECHNIQUE: CT of the head was performed without the administration of intravenous contrast. Dose modulation, iterative reconstruction, and/or weight based adjustment of the mA/kV was utilized to reduce the radiation dose to as low as reasonably achievable. COMPARISON: January 12, 2008. HISTORY: ORDERING SYSTEM PROVIDED HISTORY: CVA FINDINGS: BRAIN/VENTRICLES: There are several well of circumscribed small low attenuation lesions in the left caudate nucleus extending posteriorly superiorly in the centrum semiovale with dilatation of the ipsilateral ventricle especially of the frontal horn, could have been sequela of remote embolic disease. There is an ill-defined hypodensity in the left midbrain which could represent in hands evolving lacunar infarction, further evaluation with MRI would be beneficial. There is no acute intracranial hemorrhage, mass effect or midline shift. No abnormal extra-axial fluid collection. The gray-white differentiation is maintained without evidence of an acute infarct. There is no evidence of hydrocephalus. ORBITS: The visualized portion of the orbits demonstrate no acute abnormality. SINUSES:  The mastoid air cells demonstrate no acute abnormality. Deviation of bony nasal septal noted to the right with bilateral jack bullosa larger on the left side. Minimal mucoperiosteal disease of maxillary sinuses as well as anterior ethmoidal cells noted likely from chronic sinus disease. The frontal sinuses are non pneumatized.   The sphenoidal sinuses are clear. SOFT TISSUES/SKULL:  No acute abnormality of the visualized skull or soft tissues. Multiple well-circumscribed small low-attenuation lesions in the left caudate nucleus extending posterior superiorly to the centrum semi ovale with dilatation of the ipsilateral ventricle, this likely a sequela of remote embolic disease. In addition, there is slightly ill-defined hypodensity in the left midbrain which could potentially represent a new or evolving lacunar infarction, further evaluation with MRI would help in better characterization. Mra Head Wo Contrast    Result Date: 12/22/2017  EXAMINATION: MRA OF THE HEAD WITHOUT CONTRAST  12/22/2017 7:06 pm TECHNIQUE: MRA of the head was performed utilizing time-of-flight imaging with MIP images. No intravenous contrast was administered. Motion limited exam. COMPARISON: CTA of the head December 22, 2017 HISTORY: ORDERING SYSTEM PROVIDED HISTORY: stroke symptoms FINDINGS: ANTERIOR CIRCULATION: The internal carotid arteries are grossly normal in course and caliber without focal stenosis. The anterior cerebral and middle cerebral arteries demonstrate no focal stenosis. POSTERIOR CIRCULATION: The posterior cerebral arteries demonstrate no severe focal stenosis. The vertebral and basilar arteries appear grossly unremarkable. ANEURYSM: No large intracranial aneurysm is seen. Severely motion limited exam limits sensitivity. Previously noted thrombosis of the left vertebral artery and basilar artery appear less conspicuous than on CTA. Result Date: 12/22/2017  EXAMINATION: CTA OF THE HEAD WITH CONTRAST; CTA OF THE NECK  12/22/2017 4:40 pm: TECHNIQUE: CTA of the head/brain was performed with the administration of intravenous contrast. Multiplanar reformatted images are provided for review. MIP images are provided for review.  Dose modulation, iterative reconstruction, and/or weight based adjustment of the mA/kV was utilized to reduce the radiation dose to as low as reasonably achievable.; CTA of the neck was performed with the administration of intravenous contrast. Multiplanar reformatted images are provided for review. MIP images are provided for review. Stenosis of the internal carotid arteries measured using NASCET criteria. Dose modulation, iterative reconstruction, and/or weight based adjustment of the mA/kV was utilized to reduce the radiation dose to as low as reasonably achievable. COMPARISON: CT of the head from today HISTORY: ORDERING SYSTEM PROVIDED HISTORY: CVA FINDINGS: ANTERIOR CIRCULATION: The internal carotid arteries are normal in course and caliber without focal stenosis. The anterior cerebral and middle cerebral arteries demonstrate no focal stenosis. POSTERIOR CIRCULATION: There appears there appears to be a filling defect and severe narrowing of the proximal basilar artery. The distal vertebral arteries appear normal.  The proximal superior cerebellar arteries are normal.  The posterior cerebral arteries are normal.  In the posterior communicating arteries are present bilaterally. ANEURYSM: No intracranial aneurysm is seen. BRAIN: No mass effect or midline shift. No abnormal extra-axial fluid collection. The gray-white differentiation appears grossly maintained. Possible left pontine infarct appears less conspicuous. Old lacunar infarction noted in the basal ganglia on the left. Incidental note is made of a bovine arch. The origins of the great vessels are otherwise unremarkable. There is plaque noted along the aortic arch. There is a small amount of mixed plaque at the carotid bulb on the left with no significant stenosis. The common, external, and internal carotid arteries are otherwise normal in course and caliber bilaterally. There is small amount of focal plaque at the origin of the right vertebral artery with possible moderate stenosis. The vertebral arteries otherwise appear normal in course and caliber bilaterally. 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 Probable acute infarct left vern brian. Possible moderate stenosis at the origin of the right vertebral artery due to mixed plaque. RECOMMENDATIONS: Sensitivity is limited without curved multi planar and 3D reformats. An addendum will be performed when these are available. The findings were sent to the Radiology Results [address] at 4:58 pm on 12/22/2017to be communicated to a licensed caregiver. Case discussed with the emergency physician Dr. Kattie Leventhal  at 5:05 p.m. Mri Brain Wo Contrast    Result Date: 12/22/2017  EXAMINATION: MRI OF THE BRAIN WITHOUT CONTRAST  12/22/2017 7:06 pm TECHNIQUE: Multiplanar multisequence MRI of the brain was performed without the administration of intravenous contrast. COMPARISON: None. HISTORY: ORDERING SYSTEM PROVIDED HISTORY: limited stroke protocol FINDINGS: INTRACRANIAL STRUCTURES/VENTRICLES: There is no acute infarct. No mass effect or midline shift. No abnormal extra-axial fluid collection. The ventricles and sulci are normal in size and configuration. The sellar/suprasellar regions appear unremarkable. The normal signal voids within the major intracranial vessels appear maintained. There is subtle T2 lengthening within the left vern brian. ORBITS: The visualized portion of the orbits demonstrate no acute abnormality. SINUSES: There is mucosal thickening in the maxillary in sphenoid sinuses. BONES/SOFT TISSUES: The bone marrow signal intensity appears normal. The craniocervical junction is normal in appearance. No acute infarct. Subtle T2 lengthening left vern brian may represent chronic ischemia.        Impression and Plan: Mr. Jordin Luke is a 36 y.o. 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

## 2017-12-23 NOTE — PROGRESS NOTES
PRETTY FAM, PPatient Assessment complete. Stroke-like symptoms [R29.90] . Vitals:    12/22/17 1802   BP: (!) 126/98   Pulse:    Resp:    Temp:    SpO2: 97%   . Patients home meds are   Prior to Admission medications    Not on File   .   Recent Surgical History: None = 0     Assessment     Peak Flow (asthma only)    Predicted: 520  Personal Best: NA  PEF   % Predicted   Peak Flow :     FEV1/FVC    FEV1 Predicted 3.72      FEV1 NA    FEV1 % Predicted   FVC   IS volume   IBW 68.4 kg    RR 12  Breath Sounds: clear      · Bronchodilator assessment at level  1  · Hyperinflation assessment at level   · Secretion Management assessment at level    ·   · [x]    Bronchodilator Assessment  BRONCHODILATOR ASSESSMENT SCORE  Score 0 1 2 3 4 5   Breath Sounds   []  Patient Baseline [x]  No Wheeze good aeration []  Faint, scattered wheezing, good aeration []  Expiratory Wheezing and or moderately diminished []  Insp/Exp wheeze and/or very diminished []  Insp/Exp and/ or marked distress   Respiratory Rate   []  Patient Baseline [x]  Less than 20 [x]  Less than 20 []  20-25 []  Greater than 25 []  Greater than 25   Peak flow % of Pred or PB [x]  NA   []  Greater than 90%  []  81-90% []  71-80% []  Less than or equal to 70%  or unable to perform []  Unable due to Respiratory Distress   Dyspnea re []  Patient Baseline [x]  No SOB [x]  No SOB []  SOB on exertion []  SOB min activity []  At rest/acute   e FEV% Predicted       []  NA []  Above 69%  []  Unable []  Above 60-69%  []  Unable []  Above 50-59%  []  Unable []  Above 35-49%  []  Unable []  Less than 35%  []  Unable                 []  Hyperinflation Assessment  Score 1 2 3   CXR and Breath Sounds   []  Clear []  No atelectasis  Basilar aeration []  Atelectasis or absent basilar breath sounds   Incentive Spirometry Volume  (Per IBW)   []  Greater than or equal to 15ml/Kg []  less than 15ml/Kg []  less than 15ml/Kg   Surgery within last 2 weeks []  None or general []  Abdominal or thoracic surgery  []  Abdominal or thoracic   Chronic Pulmonary Historyre []  No []  Yes []  Yes     []  Secretion Management Assessment  Score 1 2 3   Bilateral Breath Sounds   []  Occasional Rhonchi []  Scattered Rhonchi []  Course Rhonchi and/or poor aeration   Sputum    []  Small amount of thin secretions []  Moderate amount of viscous secretions []  Copius, Viscious Yellow/ Secretions   CXR as reported by physician []  clear  []  Unavailable []  Infiltrates and/or consolidation  []  Unavailable []  Mucus Plugging and or lobar consolidation  []  Unavailable   Cough []  Strong, productive cough []  Weak productive cough []  No cough or weak non-productive cough   RODNEYEMILIA  SARWAT  9:07 PM                            FEMALE                                  MALE                            FEV1 Predicted Normal Values                        FEV1 Predicted Normal Values          Age                                     Height in Feet and Inches       Age                                     Height in Feet and Inches       4' 11\" 5' 1\" 5' 3\" 5' 5\" 5' 7\" 5' 9\" 5' 11\" 6' 1\"  4' 11\" 5' 1\" 5' 3\" 5' 5\" 5' 7\" 5' 9\" 5' 11\" 6' 1\"   42 - 45 2.49 2.66 2.84 3.03 3.22 3.42 3.62 3.83 42 - 45 2.82 3.03 3.26 3.49 3.72 3.96 4.22 4.47   46 - 49 2.40 2.57 2.76 2.94 3.14 3.33 3.54 3.75 46 - 49 2.70 2.92 3.14 3.37 3.61 3.85 4.10 4.36   50 - 53 2.31 2.48 2.66 2.85 3.04 3.24 3.45 3.66 50 - 53 2.58 2.80 3.02 3.25 3.49 3.73 3.98 4.24   54 - 57 2.21 2.38 2.57 2.75 2.95 3.14 3.35 3.56 54 - 57 2.46 2.67 2.89 3.12 3.36 3.60 3.85 4.11   58 - 61 2.10 2.28 2.46 2.65 2.84 3.04 3.24 3.45 58 - 61 2.32 2.54 2.76 2.99 3.23 3.47 3.72 3.98   62 - 65 1.99 2.17 2.35 2.54 2.73 2.93 3.13 3.34 62 - 65 2.19 2.40 2.62 2.85 3.09 3.33 3.58 3.84   66 - 69 1.88 2.05 2.23 2.42 2.61 2.81 3.02 3.23 66 - 69 2.04 2.26 2.48 2.71 2.95 3.19 3.44 3.70   70+ 1.82 1.99 2.17 2.36 2.55 2.75 2.95 3.16 70+ 1.97 2.19 2.41 2.64 2.87 3.12 3.37 3.62

## 2017-12-23 NOTE — PROGRESS NOTES
This is a 36 y.o. male admitted 12/22/2017 for Stroke-like symptoms [R29.90]. See H&P of admitting/intern resident for more details. Patient admitted with a history of dysarthria, dizziness, visual symptom. Patient states he is similar episode in March when he has the weakness of the right arm. His weakness gradually improved he still has sensory deficit on the right side. The CTA Ahead was concerning for partial thrombosis in the basilar artery, patient developed of endovascular. No intervention needed for that. Patient's symptoms resolved by phone. MRA showed subtle T2 lengthening within the left vern brian likely passed to. His factor for stroke or smoking, history of marijuana. On labs he is having hyperlipidemia. No history of apical liver disorder. The patient likely has vertebrobasilar TIA, we will do hypercoagulable workup and get echo. Treat his hyperlipidemia and modify risk factors. BMP:   Recent Labs      12/22/17   1553  12/23/17   0930   NA  137  141   K  4.1  3.7   CL  98  107   CO2  22  19*   BUN  7  7   CREATININE  0.82  0.83   GLUCOSE  107*  164*     CBC: )  Recent Labs      12/22/17   1553  12/23/17   0930   WBC  6.8  8.6   HGB  16.9  14.5   HCT  51.5*  44.9   PLT  See Reflexed IPF Result  189          Assessment    Active Problems:    Stroke-like symptoms    Marijuana abuse    Hx of TIA (transient ischemic attack) and stroke    Hyperlipidemia        Plan     I will increase his Lipitor from 40 to 80 mg. We will get echocardiogram of the heart, he may need JING. I will also get hypercoagulable workup as he is young and this is second Episode of TIA. Discussed the importance of quitting smoking, marijuana. Change fluid to half-normal saline he is getting hyperchloremic non-gap acidosis. DCD dorsum tomorrow.     Jeane Edmonds MD            Department of Internal Medicine  [facility]         12/23/2017, 3:12 PM

## 2017-12-23 NOTE — PROGRESS NOTES
09/16/2013    CALCIUM 8.3 12/23/2017    BILITOT 0.3 09/16/2013    ALKPHOS 114 09/16/2013    AST 23 09/16/2013    ALT 23 09/16/2013     IMPRESSION/RECOMMENDATIONS:      35 yo male presenting with light-headeness. On stroke work up, CTA suggested several possible areas of abnormality including the proximal basilar where there may be a partial thrombus for which we were consulted. S/p loading with clopidogrel 300mg and aspirin 325mg  Will start aspin 81 mg daily, plavix 75 mg daily and lipitor 40 mg daily. MRI brain ruled out acute strokes however patient father had a stroke at age of 40 and the patient's CT is showing old left caudate stroke. MRA head: Previously noted thrombosis of the left vertebral artery and basilar artery appear less conspicuous than on CTA. Stroke work up still pending: TTE and PT/OT eval is still pending. Smoking and recreational drug cessation advised. Continue medical management , no endovascular intervention is indicated. No further recommendations from the neuroendovascular prospective. Please don't hesitate to contact us for any further questions or concerns. Patient to follow up in the neuro endovascular clinic as an outpatient with Dr Samy Valverde in 2-3 weeks and with a PCP to continue medical management. Discussed with Dr Nell Rios.   Faina Jaramillo MD  Neuroendovascular Fellow  Stroke, [facility]  [address]

## 2017-12-23 NOTE — PROGRESS NOTES
Daily Progress Note  Neuro Critical Care      Patient Name: Ankit Vazquez  Patient : 1975  Room/Bed: 8277/2839-30  Allergies: No Known Allergies  Problem List:   Patient Active Problem List   Diagnosis    Stroke-like symptoms     INTERVAL HISTORY:  This is a 36 y.o. Male who presented to the ED with complaints of dysarthria and intermittent vision changes, reported as \"black spots\" in his visual fields. Patient states his last known well was  when he went to bed at around 10:50PM. Patient's past medical history is significant for CVA around 2017; however, patient never went to the hospital for evaluation. He reports right upper extremity weakness and dyscoordination that has improved some, but remains since March. He currently denies any symptoms. He does have a significant history of hepatitis and history of substance abuse    Currently, he is awake and alert, following commands, and neuro intact. He is eating without difficulty. He was loaded with Plavix and aspirin yesterday on admission. Overnight, he was complaining of left hand pain. Xray was negative for fracture and treated with toradol.     CURRENT MEDICATIONS:  SCHEDULED MEDICATIONS:   [START ON 2017] influenza virus vaccine  0.5 mL Intramuscular Once    atorvastatin  40 mg Oral Nightly    clopidogrel  75 mg Oral Daily    aspirin  81 mg Oral Daily    sodium chloride flush  10 mL Intravenous 2 times per day     CONTINUOUS INFUSIONS:   sodium chloride 125 mL/hr at 17     PRN MEDICATIONS:   sodium chloride flush, acetaminophen, magnesium hydroxide, ondansetron    VITALS:  Temperature Range: Temp: 98.1 °F (36.7 °C) Temp  Av.3 °F (36.8 °C)  Min: 98 °F (36.7 °C)  Max: 99 °F (37.2 °C)  BP Range: Systolic (67DSG), GDC:904 , Min:99 , ODH:433     Diastolic (36BTY), EVT:11, Min:52, Max:116    Pulse Range: Pulse  Av.1  Min: 67  Max: 111  Respiration Range: Resp  Av.5  Min: 18  Max: 28  Current Pulse Ox:

## 2017-12-23 NOTE — PROGRESS NOTES
1 [facility]     Department of Internal Medicine - Staff Internal Medicine Service   ICU PATIENT TRANSFER NOTE        Patient:  Sabi Maldonado  YOB: 1975  MRN: [id number]     Acct: [de-identified]     Admit date: 12/22/2017    Code Status:-  Full code     Reason for ICU Admission:-       SUPPORT DEVICES: [] Ventilator [] BIPAP  [] Nasal Cannula [] Room Air    Consultations:- [] Cardiology [] Nephrology  [] Hemo onco  [] GI                               [] ID [] ENT  [] Rheum [] Endo   []Physiotherapy                                 Others:-     NUTRITION:  [] NPO [] Tube Feeding (Specify: ) [] TPN  [] PO    Central Lines:- [] No   [] Yes           If yes - Days/Date of Insertion. Pt seen,examined and Chart reviewed. ICU COURSE:    The patient is a 36 y.o. male with past medical history significant for  cannabis abuse who was initially admitted on 12/22/2017 with Stroke-like symptoms [R29.90] and presented with dysarthria and vision changes. All of these symptoms have resolved currently. Patient was admitted to neuro ICU on 12/ 22. He was worked up for stroke. Patient was started on aspirin and Plavix and Lipitor. CT brain CT angiogram of head and neck, MRI MRA was ordered. She was found to have chronic ischemic disease and no acute ischemic changes. 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 Patient has been doing well in the neuro ICU. He does not have any focal deficit currently apart from mild sensory loss on the right side which may be chronic.   Patient does not have any significant past medical history and was not taking any medications in the past.        Physical Exam:   Vitals: BP 99/60   Pulse 89   Temp 98.5 °F (36.9 °C) (Oral)   Resp 23   Ht 5' 8\" (1.727 m)   Wt 72 hours. Phosphorus:No results for input(s): PHOS in the last 72 hours. BNP:No results for input(s): BNP in the last 72 hours. Glucose:No results for input(s): POCGLU in the last 72 hours. HgbA1C: No results for input(s): LABA1C in the last 72 hours. INR:   Recent Labs      12/22/17   1553   INR  0.9     Hepatic: No results for input(s): ALKPHOS, ALT, AST, PROT, BILITOT, BILIDIR, LABALBU in the last 72 hours. Amylase and Lipase:No results for input(s): LACTA, AMYLASE in the last 72 hours. Lactic Acid: No results for input(s): LACTA in the last 72 hours. CARDIAC ENZYMES:No results for input(s): CKTOTAL, CKMB, CKMBINDEX, TROPONINI in the last 72 hours. BNP: No results for input(s): BNP in the last 72 hours. Lipids: Recent Labs      12/23/17   0930   CHOL  207*   TRIG  110   HDL  27*     ABGs: No results found for: PH, PCO2, PO2, HCO3, O2SAT  Thyroid: No results found for: TSH     Urinalysis: No results for input(s): BACTERIA, BLOODU, CLARITYU, COLORU, PHUR, PROTEINU, RBCUA, SPECGRAV, BILIRUBINUR, NITRU, WBCUA, LEUKOCYTESUR, GLUCOSEU in the last 72 hours. Assessment:  Active Problems:    Stroke-like symptoms    Marijuana abuse    Hx of TIA (transient ischemic attack) and stroke    Hyperlipidemia          Plan:  Continue aspirin and Plavix as he is taking currently. Continue physical therapy  Continue high-dose statin  Follow echocardiogram.  DC IV fluids as patient is eating and drinking normally now. Patient had a mildly elevated lactic acid today.   We will follow      Deepti Baugh MD             Department of Internal Medicine  [facility]           12/23/2017, 3:23 PM

## 2017-12-24 LAB
ABSOLUTE EOS #: 0.29 K/UL (ref 0–0.44)
ABSOLUTE IMMATURE GRANULOCYTE: <0.03 K/UL (ref 0–0.3)
ABSOLUTE LYMPH #: 2.72 K/UL (ref 1.1–3.7)
ABSOLUTE MONO #: 0.58 K/UL (ref 0.1–1.2)
ANION GAP SERPL CALCULATED.3IONS-SCNC: 13 MMOL/L (ref 9–17)
BASOPHILS # BLD: 0 % (ref 0–2)
BASOPHILS ABSOLUTE: 0.03 K/UL (ref 0–0.2)
BUN BLDV-MCNC: 7 MG/DL (ref 6–20)
BUN/CREAT BLD: ABNORMAL (ref 9–20)
CALCIUM SERPL-MCNC: 8.5 MG/DL (ref 8.6–10.4)
CHLORIDE BLD-SCNC: 105 MMOL/L (ref 98–107)
CO2: 23 MMOL/L (ref 20–31)
CREAT SERPL-MCNC: 0.8 MG/DL (ref 0.7–1.2)
DIFFERENTIAL TYPE: NORMAL
EOSINOPHILS RELATIVE PERCENT: 3 % (ref 1–4)
ESTIMATED AVERAGE GLUCOSE: 117 MG/DL
GFR AFRICAN AMERICAN: >60 ML/MIN
GFR NON-AFRICAN AMERICAN: >60 ML/MIN
GFR SERPL CREATININE-BSD FRML MDRD: ABNORMAL ML/MIN/{1.73_M2}
GFR SERPL CREATININE-BSD FRML MDRD: ABNORMAL ML/MIN/{1.73_M2}
GLUCOSE BLD-MCNC: 104 MG/DL (ref 70–99)
HBA1C MFR BLD: 5.7 % (ref 4–6)
HCT VFR BLD CALC: 44.1 % (ref 40.7–50.3)
HEMOGLOBIN: 14.5 G/DL (ref 13–17)
IMMATURE GRANULOCYTES: 0 %
LACTIC ACID, WHOLE BLOOD: 1 MMOL/L (ref 0.7–2.1)
LYMPHOCYTES # BLD: 31 % (ref 24–43)
MCH RBC QN AUTO: 29.2 PG (ref 25.2–33.5)
MCHC RBC AUTO-ENTMCNC: 32.9 G/DL (ref 28.4–34.8)
MCV RBC AUTO: 88.7 FL (ref 82.6–102.9)
MONOCYTES # BLD: 7 % (ref 3–12)
PDW BLD-RTO: 12.7 % (ref 11.8–14.4)
PLATELET # BLD: 215 K/UL (ref 138–453)
PLATELET ESTIMATE: NORMAL
PMV BLD AUTO: 9 FL (ref 8.1–13.5)
POTASSIUM SERPL-SCNC: 4.1 MMOL/L (ref 3.7–5.3)
RBC # BLD: 4.97 M/UL (ref 4.21–5.77)
RBC # BLD: NORMAL 10*6/UL
SEG NEUTROPHILS: 59 % (ref 36–65)
SEGMENTED NEUTROPHILS ABSOLUTE COUNT: 5.25 K/UL (ref 1.5–8.1)
SODIUM BLD-SCNC: 141 MMOL/L (ref 135–144)
WBC # BLD: 8.9 K/UL (ref 3.5–11.3)
WBC # BLD: NORMAL 10*3/UL

## 2017-12-24 PROCEDURE — 94762 N-INVAS EAR/PLS OXIMTRY CONT: CPT

## 2017-12-24 PROCEDURE — 85025 COMPLETE CBC W/AUTO DIFF WBC: CPT

## 2017-12-24 PROCEDURE — 99232 SBSQ HOSP IP/OBS MODERATE 35: CPT | Performed by: INTERNAL MEDICINE

## 2017-12-24 PROCEDURE — 6360000002 HC RX W HCPCS: Performed by: NEUROLOGICAL SURGERY

## 2017-12-24 PROCEDURE — 6370000000 HC RX 637 (ALT 250 FOR IP): Performed by: EMERGENCY MEDICINE

## 2017-12-24 PROCEDURE — 2060000000 HC ICU INTERMEDIATE R&B

## 2017-12-24 PROCEDURE — G0008 ADMIN INFLUENZA VIRUS VAC: HCPCS | Performed by: NEUROLOGICAL SURGERY

## 2017-12-24 PROCEDURE — 80048 BASIC METABOLIC PNL TOTAL CA: CPT

## 2017-12-24 PROCEDURE — 36415 COLL VENOUS BLD VENIPUNCTURE: CPT

## 2017-12-24 PROCEDURE — 99232 SBSQ HOSP IP/OBS MODERATE 35: CPT | Performed by: PSYCHIATRY & NEUROLOGY

## 2017-12-24 PROCEDURE — 6370000000 HC RX 637 (ALT 250 FOR IP): Performed by: STUDENT IN AN ORGANIZED HEALTH CARE EDUCATION/TRAINING PROGRAM

## 2017-12-24 PROCEDURE — 90686 IIV4 VACC NO PRSV 0.5 ML IM: CPT | Performed by: NEUROLOGICAL SURGERY

## 2017-12-24 PROCEDURE — 6370000000 HC RX 637 (ALT 250 FOR IP): Performed by: NURSE PRACTITIONER

## 2017-12-24 PROCEDURE — 2580000003 HC RX 258: Performed by: EMERGENCY MEDICINE

## 2017-12-24 PROCEDURE — 83605 ASSAY OF LACTIC ACID: CPT

## 2017-12-24 RX ORDER — OXYCODONE HYDROCHLORIDE AND ACETAMINOPHEN 5; 325 MG/1; MG/1
1 TABLET ORAL ONCE
Status: COMPLETED | OUTPATIENT
Start: 2017-12-25 | End: 2017-12-25

## 2017-12-24 RX ORDER — CLOPIDOGREL BISULFATE 75 MG/1
75 TABLET ORAL DAILY
Qty: 30 TABLET | Refills: 3 | Status: SHIPPED | OUTPATIENT
Start: 2017-12-25 | End: 2017-12-27

## 2017-12-24 RX ORDER — ASPIRIN 81 MG/1
81 TABLET, CHEWABLE ORAL DAILY
Qty: 30 TABLET | Refills: 3 | Status: SHIPPED | OUTPATIENT
Start: 2017-12-25 | End: 2017-12-27

## 2017-12-24 RX ORDER — ATORVASTATIN CALCIUM 80 MG/1
80 TABLET, FILM COATED ORAL NIGHTLY
Qty: 30 TABLET | Refills: 3 | Status: SHIPPED | OUTPATIENT
Start: 2017-12-24 | End: 2017-12-27

## 2017-12-24 RX ADMIN — ACETAMINOPHEN 650 MG: 325 TABLET ORAL at 21:37

## 2017-12-24 RX ADMIN — ACETAMINOPHEN 650 MG: 325 TABLET ORAL at 15:21

## 2017-12-24 RX ADMIN — Medication 10 ML: at 08:33

## 2017-12-24 RX ADMIN — ASPIRIN 81 MG 81 MG: 81 TABLET ORAL at 08:32

## 2017-12-24 RX ADMIN — Medication 10 ML: at 19:54

## 2017-12-24 RX ADMIN — ATORVASTATIN CALCIUM 80 MG: 80 TABLET, FILM COATED ORAL at 19:54

## 2017-12-24 RX ADMIN — INFLUENZA VIRUS VACCINE 0.5 ML: 15; 15; 15; 15 SUSPENSION INTRAMUSCULAR at 08:35

## 2017-12-24 RX ADMIN — CLOPIDOGREL 75 MG: 75 TABLET, FILM COATED ORAL at 08:32

## 2017-12-24 ASSESSMENT — PAIN SCALES - GENERAL
PAINLEVEL_OUTOF10: 10
PAINLEVEL_OUTOF10: 9
PAINLEVEL_OUTOF10: 10

## 2017-12-24 NOTE — PLAN OF CARE
Problem: Falls - Risk of:  Goal: Will remain free from falls  Will remain free from falls  Outcome: Ongoing  Pt remains free of falls at this time. Bed locked in lowest position, siderails x2, call light in reach. Non-skid footwear applied. Pt ambulates in room with steady gait. Encouraged pt to call for assistance as needed for safety. Falling star posted outside of room. Will continue to monitor.

## 2017-12-24 NOTE — PROGRESS NOTES
[address] [facility] [address]  Occupational Therapy Not Seen Note    Patient not available for Occupational Therapy due to:    [] Testing:    [] Hemodialysis    [] Blood Transfusion in Progress    []Refusal by Patient:    [] Surgery/Procedure:    [] Strict Bedrest    [] Sedation    [] Spine Precautions     [] Pt being transferred to palliative care at this time. Spoke with pt/family and OT services to be defered. [x] Pt independent with functional mobility and functional tasks. Pt with no OT acute care needs at this time, will defer OT eval.    [] Other    Next Scheduled Treatment: N/A    Signature:  Angel Doran OTR/L

## 2017-12-24 NOTE — PROGRESS NOTES
NEUROLOGY INPATIENT PROGRESS NOTE    12/24/2017         Subjective: Soledad Gibbs is a  36 y.o. male admitted on 12/22/2017 with Stroke-like symptoms [R29.90]  Chart reviewed. Discussed with caregivers. No acute issues overnight. Briefly, this is a  36 y.o. male admitted on 12/22/2017 with complaints of difficulty speaking with some slurred speech and nonspecific vision changes the beginning this morning after waking up. Merlene Bello states that he went to bed last night around 10:50 PM had no symptoms at that time.  Patient states he has intermittent vision changes and intermittent episodes of dysarthria / trouble speaking. [facility] currently denies any symptoms including no sensory or motor changes/wobbliness. Patient's past medical history is significant for self-reported possible stroke approximately 3-4 months ago, however patient states that he never went to the hospital to get evaluated.  Patient states that since that time he has had difficulty with right upper extremity fine motor movements. He states that for the past several months he has had coordination problems with his right hand, unable to write his name. [facility] states that he is currently on no medication and denies illicit drug use, although his cousin who is beside suspects he has recently been using heroin. Patient was loaded with 300 mg clopidogrel and aspirin 325 mg. Although CT scan suggested acute pontine infarction, this was not borne out by brain MRI which described only subtle T2 lengthening in left vern-brian which may represent chronic ischemia.   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   He does have a significant past medical history of hepatitis and per significant other does have a history of substance abuse. Patient also denies any recent fevers/chills, nausea/vomiting difficulty breathing/chest pain, abdominal pain or any  symptoms. LDL is pending. Random blood glucose borderline elevated; A1c pending. Urine tox screen positive for cannabinoids. Alcohol level less than 10. EKG sinus tachycardia rate 108 with no acute ST-T changes. No current facility-administered medications on file prior to encounter. No current outpatient prescriptions on file prior to encounter. Allergies: Enid Kiser has No Known Allergies. Past Medical History:   Diagnosis Date    Cerebrovascular accident (CVA) ([facility] [address]     Hyperlipidemia 12/23/2017       History reviewed. No pertinent surgical history.         Medications:     clopidogrel  75 mg Oral Daily    aspirin  81 mg Oral Daily    atorvastatin  80 mg Oral Nightly    sodium chloride flush  10 mL Intravenous 2 times per day     PRN Meds include: sodium chloride flush, acetaminophen, magnesium hydroxide, ondansetron    Objective:   /83   Pulse 84   Temp 98.7 °F (37.1 °C)   Resp 22   Ht 5' 8\" (1.727 m)   Wt 162 lb 4.1 oz (73.6 kg)   SpO2 94%   BMI 24.67 kg/m²     Blood pressure range: Systolic (75EVH), UVO:198 , Min:109 , YTF:633   ; Diastolic (18NPS), MLB:25, Min:71, Max:97        NEUROLOGIC EXAMINATION  GENERAL  Appears comfortable and in no distress   HEENT  NC/ AT   cardiovascular  S1 and S2 heard; palpation of pulses: radial pulse    NECK  Supple and no bruits heard   MENTAL STATUS:  Alert, oriented, intact memory, no confusion, normal speech, normal language, no hallucination or delusion   CRANIAL NERVES: II     -      PERRLA, optic discs; clear; posterior segments  Visual fields intact to confrontation  III,IV,VI -  EOMs full, no afferent defect, no CHEN, no ptosis  V     -     Normal facial sensation   VII    -     Normal facial symmetry  VIII   -     Intact hearing   IX,X -     Symmetrical palate  XI    -     Symmetrical shoulder shrug  XII   -     Midline tongue, no atrophy    MOTOR FUNCTION:  significant

## 2017-12-24 NOTE — PROGRESS NOTES
rales or rhonchi, symmetric air entry  Heart[de-identified] normal rate, regular rhythm, normal S1, S2, no murmurs, rubs, clicks or gallops  Abdomen:  soft, nontender, nondistended, no masses or organomegaly  Extremities: peripheral pulses normal, no pedal edema, no clubbing or cyanosis       Medications:   Scheduled Medications   clopidogrel  75 mg Oral Daily    aspirin  81 mg Oral Daily    atorvastatin  80 mg Oral Nightly    sodium chloride flush  10 mL Intravenous 2 times per day       PRN Medications  sodium chloride flush 10 mL PRN   acetaminophen 650 mg Q4H PRN   magnesium hydroxide 30 mL Daily PRN   ondansetron 4 mg Q6H PRN       Diagnostic Labs and Imaging    CBC:  Recent Labs      12/22/17   1553  12/23/17   0930  12/24/17   0629   WBC  6.8  8.6  8.9   HGB  16.9  14.5  14.5   PLT  See Reflexed IPF Result  189  215     BMP: Recent Labs      12/22/17   1553  12/23/17   0930  12/24/17   0629   NA  137  141  141   K  4.1  3.7  4.1   CL  98  107  105   CO2  22  19*  23   BUN  7  7  7   CREATININE  0.82  0.83  0.80   GLUCOSE  107*  164*  104*     Hepatic: No results for input(s): AST, ALT, ALB, BILITOT, ALKPHOS in the last 72 hours. INR:   Recent Labs      12/22/17   1553   INR  0.9     Troponin: No results for input(s): TROPONINI in the last 72 hours. Assessment and Plan: Active Problems:    Stroke-like symptoms    Marijuana abuse    Hx of TIA (transient ischemic attack) and stroke    Hyperlipidemia    Cerebrovascular accident (CVA) ([facility] [place] [address]      1. Stroke: CTA showed narrowing of basilar artery. No intervention as per neuro endovascular. On ASA, Statin. ECHO pending. Hypercoagulable work up pending.        2. Hyperlipidemia: On  Statin         Gris Brooks, PGY-1, Internal Medicine Resident  [facility]

## 2017-12-25 PROBLEM — E78.49 OTHER HYPERLIPIDEMIA: Status: ACTIVE | Noted: 2017-12-23

## 2017-12-25 LAB
ANTICARDIOLIPIN IGA ANTIBODY: 3.3 APU
ANTICARDIOLIPIN IGG ANTIBODY: 3.2 GPU
CARDIOLIPIN AB IGM: 9.2 MPU
PROTEIN C ANTIGEN: 93 % (ref 63–153)
PROTEIN S ANTIGEN, TOTAL: 107 % (ref 84–134)

## 2017-12-25 PROCEDURE — 2060000000 HC ICU INTERMEDIATE R&B

## 2017-12-25 PROCEDURE — 6370000000 HC RX 637 (ALT 250 FOR IP): Performed by: INTERNAL MEDICINE

## 2017-12-25 PROCEDURE — 6370000000 HC RX 637 (ALT 250 FOR IP): Performed by: NURSE PRACTITIONER

## 2017-12-25 PROCEDURE — 6370000000 HC RX 637 (ALT 250 FOR IP): Performed by: STUDENT IN AN ORGANIZED HEALTH CARE EDUCATION/TRAINING PROGRAM

## 2017-12-25 PROCEDURE — 2580000003 HC RX 258: Performed by: EMERGENCY MEDICINE

## 2017-12-25 PROCEDURE — 99232 SBSQ HOSP IP/OBS MODERATE 35: CPT | Performed by: PSYCHIATRY & NEUROLOGY

## 2017-12-25 PROCEDURE — 99232 SBSQ HOSP IP/OBS MODERATE 35: CPT | Performed by: INTERNAL MEDICINE

## 2017-12-25 RX ORDER — OXYCODONE HYDROCHLORIDE AND ACETAMINOPHEN 5; 325 MG/1; MG/1
1 TABLET ORAL EVERY 6 HOURS PRN
Status: DISCONTINUED | OUTPATIENT
Start: 2017-12-25 | End: 2017-12-27 | Stop reason: HOSPADM

## 2017-12-25 RX ADMIN — Medication 10 ML: at 20:23

## 2017-12-25 RX ADMIN — CLOPIDOGREL 75 MG: 75 TABLET, FILM COATED ORAL at 08:28

## 2017-12-25 RX ADMIN — OXYCODONE HYDROCHLORIDE AND ACETAMINOPHEN 1 TABLET: 5; 325 TABLET ORAL at 00:00

## 2017-12-25 RX ADMIN — ATORVASTATIN CALCIUM 80 MG: 80 TABLET, FILM COATED ORAL at 20:21

## 2017-12-25 RX ADMIN — OXYCODONE HYDROCHLORIDE AND ACETAMINOPHEN 1 TABLET: 5; 325 TABLET ORAL at 20:21

## 2017-12-25 RX ADMIN — OXYCODONE HYDROCHLORIDE AND ACETAMINOPHEN 1 TABLET: 5; 325 TABLET ORAL at 14:39

## 2017-12-25 RX ADMIN — ASPIRIN 81 MG 81 MG: 81 TABLET ORAL at 08:28

## 2017-12-25 RX ADMIN — Medication 10 ML: at 09:47

## 2017-12-25 RX ADMIN — OXYCODONE HYDROCHLORIDE AND ACETAMINOPHEN 1 TABLET: 5; 325 TABLET ORAL at 08:40

## 2017-12-25 ASSESSMENT — PAIN SCALES - GENERAL
PAINLEVEL_OUTOF10: 10
PAINLEVEL_OUTOF10: 10
PAINLEVEL_OUTOF10: 9
PAINLEVEL_OUTOF10: 10
PAINLEVEL_OUTOF10: 10

## 2017-12-25 ASSESSMENT — PAIN DESCRIPTION - LOCATION: LOCATION: HEAD

## 2017-12-25 ASSESSMENT — PAIN DESCRIPTION - FREQUENCY: FREQUENCY: CONTINUOUS

## 2017-12-25 ASSESSMENT — PAIN DESCRIPTION - DESCRIPTORS: DESCRIPTORS: HEADACHE

## 2017-12-25 ASSESSMENT — PAIN DESCRIPTION - PAIN TYPE: TYPE: ACUTE PAIN

## 2017-12-25 ASSESSMENT — PAIN DESCRIPTION - ONSET: ONSET: ON-GOING

## 2017-12-25 NOTE — PROGRESS NOTES
Midline tongue, no atrophy    MOTOR FUNCTION:  significant for good strength of grade 5/5 in bilateral proximal and distal muscle groups of both upper and lower extremities with normal bulk, normal tone and no involuntary movements, no tremor   SENSORY FUNCTION:  Normal touch, normal pin, normal vibration, normal proprioception   CEREBELLAR FUNCTION:  Intact fine motor control over upper limbs   REFLEX FUNCTION:  Symmetric, no perverted reflex, no Babinski sign   STATION and GAIT  Not tested         Data:    Lab Results:   CBC:   Recent Labs      12/22/17   1553  12/23/17   0930  12/24/17   0629   WBC  6.8  8.6  8.9   HGB  16.9  14.5  14.5   PLT  See Reflexed IPF Result  189  215     BMP:    Recent Labs      12/22/17   1553  12/23/17   0930  12/24/17   0629   NA  137  141  141   K  4.1  3.7  4.1   CL  98  107  105   CO2  22  19*  23   BUN  7  7  7   CREATININE  0.82  0.83  0.80   GLUCOSE  107*  164*  104*         Lab Results   Component Value Date    CHOL 207 (H) 12/23/2017    LDLCHOLESTEROL 158 (H) 12/23/2017    HDL 27 (L) 12/23/2017    TRIG 110 12/23/2017    ALT 23 09/16/2013    AST 23 09/16/2013    INR 0.9 12/22/2017    LABA1C 5.7 12/23/2017       No results found for: PHENYTOIN, PHENYTOIN, VALPROATE, CBMZ    CT head old left caudate stroke; probable acute left vern-pontine infarct  CTA head: 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 MRI brain no acute intracranial pathology but T2-hyperintensity in left brian noted  MRA head thrombosis of left vertebral artery and basilar artery less conspicuous than CTA    Toxicology (12/22/17): Positive for cannabinoid  Hypercoagulable workup: Pending  ACL Ab: negative.      TTE and JING: Pending          Impression and Plan: Mr. Yuko Cr is a 36 y.o. 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 Hyper coag workup in progress. JING in am.   Will follow with you.

## 2017-12-25 NOTE — PROGRESS NOTES
extremity weakness, Improving   Lungs:  clear to auscultation, no wheezes, rales or rhonchi, symmetric air entry  Heart[de-identified] normal rate, regular rhythm, normal S1, S2, no murmurs, rubs, clicks or gallops  Abdomen:  soft, nontender, nondistended, no masses or organomegaly  Extremities: peripheral pulses normal, no pedal edema, no clubbing or cyanosis       Medications:   Scheduled Medications   clopidogrel  75 mg Oral Daily    aspirin  81 mg Oral Daily    atorvastatin  80 mg Oral Nightly    sodium chloride flush  10 mL Intravenous 2 times per day       PRN Medications    sodium chloride flush 10 mL PRN   acetaminophen 650 mg Q4H PRN   magnesium hydroxide 30 mL Daily PRN   ondansetron 4 mg Q6H PRN       Diagnostic Labs and Imaging    CBC:  Recent Labs      12/22/17   1553  12/23/17   0930  12/24/17   0629   WBC  6.8  8.6  8.9   HGB  16.9  14.5  14.5   PLT  See Reflexed IPF Result  189  215     BMP:   Recent Labs      12/22/17   1553  12/23/17   0930  12/24/17   0629   NA  137  141  141   K  4.1  3.7  4.1   CL  98  107  105   CO2  22  19*  23   BUN  7  7  7   CREATININE  0.82  0.83  0.80   GLUCOSE  107*  164*  104*     Hepatic: No results for input(s): AST, ALT, ALB, BILITOT, ALKPHOS in the last 72 hours. INR:   Recent Labs      12/22/17   1553   INR  0.9     Troponin: No results for input(s): TROPONINI in the last 72 hours. Assessment and Plan: Active Problems:    Stroke-like symptoms    Marijuana abuse    Hx of TIA (transient ischemic attack) and stroke    Hyperlipidemia    Cerebrovascular accident (CVA) ([facility] [address]      1. Stroke: CTA showed narrowing of basilar artery. No intervention as per neuro endovascular. On ASA, Statin. ECHO pending. Hypercoagulable work up pending.        2. Hyperlipidemia: On  Statin         Beti Yee, PGY-1, Internal Medicine Resident  [facility]

## 2017-12-25 NOTE — PLAN OF CARE
Problem: Falls - Risk of:  Goal: Will remain free from falls  Will remain free from falls   Outcome: Ongoing  Call light in reach; bed locked in lowest position; bed alarm on; hourly rounding.

## 2017-12-26 ENCOUNTER — APPOINTMENT (OUTPATIENT)
Dept: CARDIAC CATH/INVASIVE PROCEDURES | Age: 42
DRG: 047 | End: 2017-12-26
Payer: COMMERCIAL

## 2017-12-26 LAB
ANTI B2-GLYCOPROTEIN IGG: 0 SGU (ref 0–20)
ANTI B2-GLYCOPROTEIN IGM: 2 SMU (ref 0–20)
LV EF: 55 %
LV EF: 58 %
LVEF MODALITY: NORMAL
LVEF MODALITY: NORMAL

## 2017-12-26 PROCEDURE — 6370000000 HC RX 637 (ALT 250 FOR IP): Performed by: NURSE PRACTITIONER

## 2017-12-26 PROCEDURE — 2060000000 HC ICU INTERMEDIATE R&B

## 2017-12-26 PROCEDURE — 6360000002 HC RX W HCPCS

## 2017-12-26 PROCEDURE — B24BZZ4 ULTRASONOGRAPHY OF HEART WITH AORTA, TRANSESOPHAGEAL: ICD-10-PCS | Performed by: INTERNAL MEDICINE

## 2017-12-26 PROCEDURE — 93312 ECHO TRANSESOPHAGEAL: CPT

## 2017-12-26 PROCEDURE — 93306 TTE W/DOPPLER COMPLETE: CPT

## 2017-12-26 PROCEDURE — 2580000003 HC RX 258: Performed by: STUDENT IN AN ORGANIZED HEALTH CARE EDUCATION/TRAINING PROGRAM

## 2017-12-26 PROCEDURE — 2580000003 HC RX 258: Performed by: EMERGENCY MEDICINE

## 2017-12-26 PROCEDURE — 6370000000 HC RX 637 (ALT 250 FOR IP): Performed by: INTERNAL MEDICINE

## 2017-12-26 PROCEDURE — 93325 DOPPLER ECHO COLOR FLOW MAPG: CPT

## 2017-12-26 PROCEDURE — 99232 SBSQ HOSP IP/OBS MODERATE 35: CPT | Performed by: PSYCHIATRY & NEUROLOGY

## 2017-12-26 PROCEDURE — 99233 SBSQ HOSP IP/OBS HIGH 50: CPT | Performed by: INTERNAL MEDICINE

## 2017-12-26 PROCEDURE — 94762 N-INVAS EAR/PLS OXIMTRY CONT: CPT

## 2017-12-26 PROCEDURE — 6370000000 HC RX 637 (ALT 250 FOR IP): Performed by: STUDENT IN AN ORGANIZED HEALTH CARE EDUCATION/TRAINING PROGRAM

## 2017-12-26 RX ORDER — SODIUM CHLORIDE 0.9 % (FLUSH) 0.9 %
10 SYRINGE (ML) INJECTION EVERY 12 HOURS SCHEDULED
Status: DISCONTINUED | OUTPATIENT
Start: 2017-12-26 | End: 2017-12-27 | Stop reason: HOSPADM

## 2017-12-26 RX ORDER — SODIUM CHLORIDE 0.9 % (FLUSH) 0.9 %
10 SYRINGE (ML) INJECTION PRN
Status: DISCONTINUED | OUTPATIENT
Start: 2017-12-26 | End: 2017-12-27 | Stop reason: HOSPADM

## 2017-12-26 RX ADMIN — Medication 10 ML: at 20:25

## 2017-12-26 RX ADMIN — OXYCODONE HYDROCHLORIDE AND ACETAMINOPHEN 1 TABLET: 5; 325 TABLET ORAL at 09:40

## 2017-12-26 RX ADMIN — ASPIRIN 81 MG 81 MG: 81 TABLET ORAL at 09:36

## 2017-12-26 RX ADMIN — OXYCODONE HYDROCHLORIDE AND ACETAMINOPHEN 1 TABLET: 5; 325 TABLET ORAL at 21:58

## 2017-12-26 RX ADMIN — OXYCODONE HYDROCHLORIDE AND ACETAMINOPHEN 1 TABLET: 5; 325 TABLET ORAL at 02:21

## 2017-12-26 RX ADMIN — CLOPIDOGREL 75 MG: 75 TABLET, FILM COATED ORAL at 09:36

## 2017-12-26 RX ADMIN — ATORVASTATIN CALCIUM 80 MG: 80 TABLET, FILM COATED ORAL at 20:24

## 2017-12-26 ASSESSMENT — PAIN DESCRIPTION - ORIENTATION
ORIENTATION: LEFT

## 2017-12-26 ASSESSMENT — PAIN SCALES - GENERAL
PAINLEVEL_OUTOF10: 9
PAINLEVEL_OUTOF10: 10

## 2017-12-26 ASSESSMENT — PAIN DESCRIPTION - DESCRIPTORS
DESCRIPTORS: THROBBING
DESCRIPTORS: ACHING

## 2017-12-26 ASSESSMENT — PAIN DESCRIPTION - LOCATION
LOCATION: HAND

## 2017-12-26 ASSESSMENT — PAIN DESCRIPTION - PAIN TYPE
TYPE: ACUTE PAIN
TYPE: CHRONIC PAIN
TYPE: CHRONIC PAIN

## 2017-12-26 ASSESSMENT — PAIN DESCRIPTION - FREQUENCY
FREQUENCY: INTERMITTENT
FREQUENCY: CONTINUOUS

## 2017-12-26 NOTE — PROGRESS NOTES
Lungs:  clear to auscultation, no wheezes, rales or rhonchi, symmetric air entry  Heart[de-identified] normal rate, regular rhythm, normal S1, S2, no murmurs, rubs, clicks or gallops  Abdomen:  soft, nontender, nondistended, no masses or organomegaly  Extremities: peripheral pulses normal, no pedal edema, no clubbing or cyanosis       Medications:   Scheduled Medications   clopidogrel  75 mg Oral Daily    aspirin  81 mg Oral Daily    atorvastatin  80 mg Oral Nightly    sodium chloride flush  10 mL Intravenous 2 times per day       PRN Medications    oxyCODONE-acetaminophen 1 tablet Q6H PRN   sodium chloride flush 10 mL PRN   acetaminophen 650 mg Q4H PRN   magnesium hydroxide 30 mL Daily PRN   ondansetron 4 mg Q6H PRN       Diagnostic Labs and Imaging    CBC:  Recent Labs      12/23/17   0930  12/24/17   0629   WBC  8.6  8.9   HGB  14.5  14.5   PLT  189  215     BMP:   Recent Labs      12/23/17   0930  12/24/17   0629   NA  141  141   K  3.7  4.1   CL  107  105   CO2  19*  23   BUN  7  7   CREATININE  0.83  0.80   GLUCOSE  164*  104*     Hepatic: No results for input(s): AST, ALT, ALB, BILITOT, ALKPHOS in the last 72 hours. INR:   No results for input(s): INR in the last 72 hours. Troponin: No results for input(s): TROPONINI in the last 72 hours. Assessment and Plan: Active Problems:    Stroke-like symptoms    Marijuana abuse    Hx of TIA (transient ischemic attack) and stroke    Other hyperlipidemia    Cerebrovascular accident (CVA) ([facility] [address]    Vertebrobasilar artery insufficiency      1. Stroke: CTA showed narrowing of basilar artery. No intervention as per neuro endovascular. On ASA, Statin. JING today. Protein C and Protein S and anti phospholipid abs negative. 2. Hyperlipidemia: On Statin.        Te Pelaez, PGY-1, Internal Medicine Resident  [facility]

## 2017-12-26 NOTE — CARE COORDINATION
Consult received for d/c planning  Patient is 36year old female present with dizziness and stroke-like symptons  Met with patient this date states was seen at [facility]  for depression and  stopped taking his medications 2 years ago. Patient sister Trudy present also during conversation. Patient denies drinking alcohol or any using any illicit substances. Patient plans to go to [facility] after d/c for mental health/counseling and resume his medications. Patient sister states patient is presently homeless and requested additional resources.   Provided list of Mental Health agencies, Housing Options , Shelter list and Food Pantry list.  Both patient and sister was appreciative of information given  Noted PT/OT following also  Insurance is [facility]

## 2017-12-26 NOTE — PROGRESS NOTES
[address] [place] [address]   Department of Internal Medicine -   Internal Medicine Residency Program  ______________________________________________________________________    Patient: Evelin Hickman  YOB: 1975   MRN: [id number]    Acct: [de-identified]     Admit date: 2017  Today's date: 17    Admitting Diagnosis: <principal problem not specified>    Subjective:   Pt seen and Chart reviewed. No acute events overnight  Patient is feeling much better. Still has some weakness. Hand pain is getting better. Fever:-  Temp (24hrs), Av.9 °F (36.6 °C), Min:97.2 °F (36.2 °C), Max:98.9 °F (37.2 °C)      Blood pressure:   Systolic (20DCU), RZA:635 , Min:107 , XJI:890     Diastolic (95ZQH), LEOPOLDO:28, Min:54, Max:77      Urine output in the last 24 hours: No intake/output data recorded. Fluids: not on fluids  Feeding: Eating and drinking  Analgesics: Tylenol   DVT Prophylaxis:  lovenox  GI Prophylaxis:   None   Glycemic Control: Well controlled  Bowel management: had BM yesterday  Indwelling catheter: None           Review of systems. · Constitutional: Negative for fever  · Cardiovascular: Negative for lightheadedness/orthostatic symptoms ,chest pain, dyspnea on exertion, palpitations or loss of consciousness. · Respiratory: Negative for cough or wheezing, sputum production, hemoptysis,   · Gastrointestinal: Negative for nausea/vomiting, change in bowel habits, abdominal pain  · Genitourinary:Negative dysuria, trouble voiding, hematuria.   · Neurological: Weakness in right upper and lower extremity     Objective:   Vital Sign:  /72   Pulse 81   Temp 98.1 °F (36.7 °C)   Resp 20   Ht 5' 8\" (1.727 m)   Wt 159 lb 11.2 oz (72.4 kg)   SpO2 100%   BMI 24.28 kg/m²       Physical Exam:  General appearance:   alert, well appearing, and in no distress  Mental Status: alert, oriented to person, place, and time  Neurologic:  Right upper and lower extremity weakness, Improving Lungs:  clear to auscultation, no wheezes, rales or rhonchi, symmetric air entry  Heart[de-identified] normal rate, regular rhythm, normal S1, S2, no murmurs, rubs, clicks or gallops  Abdomen:  soft, nontender, nondistended, no masses or organomegaly  Extremities: peripheral pulses normal, no pedal edema, no clubbing or cyanosis       Medications:   Scheduled Medications   clopidogrel  75 mg Oral Daily    aspirin  81 mg Oral Daily    atorvastatin  80 mg Oral Nightly    sodium chloride flush  10 mL Intravenous 2 times per day       PRN Medications    oxyCODONE-acetaminophen 1 tablet Q6H PRN   sodium chloride flush 10 mL PRN   acetaminophen 650 mg Q4H PRN   magnesium hydroxide 30 mL Daily PRN   ondansetron 4 mg Q6H PRN       Diagnostic Labs and Imaging    CBC:  Recent Labs      12/24/17   0629   WBC  8.9   HGB  14.5   PLT  215     BMP:   Recent Labs      12/24/17   0629   NA  141   K  4.1   CL  105   CO2  23   BUN  7   CREATININE  0.80   GLUCOSE  104*     Hepatic: No results for input(s): AST, ALT, ALB, BILITOT, ALKPHOS in the last 72 hours. INR:   No results for input(s): INR in the last 72 hours. Troponin: No results for input(s): TROPONINI in the last 72 hours. Assessment and Plan: Active Problems:    Stroke-like symptoms    Marijuana abuse    Hx of TIA (transient ischemic attack) and stroke    Other hyperlipidemia    Cerebrovascular accident (CVA) ([facility] [address]    Vertebrobasilar artery insufficiency      1. Stroke: CTA showed narrowing of basilar artery. No intervention as per neuro endovascular. On ASA, Statin. JING today. Protein C and Protein S and anti phospholipid abs negative. 2. Hyperlipidemia: On Statin. Attending Physician Statement  I have discussed the care of Brionna Espitia, including pertinent history and exam findings,  with the resident. I have seen and examined the patient and the key elements of all parts of the encounter have been performed by me.   I agree with the assessment, plan and orders as documented by the resident.      January Brannon MD  12/26/2017  11:48 AM        January Brannon, PGY-1, Internal Medicine Resident  [facility]

## 2017-12-26 NOTE — PROCEDURES
symptom has been present more than half the time in the past two weeks. --In addition, the tenth question about difficulty at work or home or getting along with others should be answered at least somewhat difficult.   --When a depression diagnosis has been made, patient preferences should be considered, especially when choosing between treatment recommendations of antidepressants treatment and psychotherapy. PHQ-9  Score Provisional diagnosis Treatment recommendation   5-9 Minimal symptoms Support, educate to call if worse; return in 1 month   10-14 Minor depression  -----------------------------------------  Dysthymia  -----------------------------------------  Major depression, mild Support, watchful waiting  -------------------------------------------------------------  Antidepressant or psychotherapy  -------------------------------------------------------------Antidepressant or psychotherapy   15-19 Major depression, moderately severe Antidepressant or psychotherapy     >20 Major depression, severe Antidepressant and psychotherapy  (especially if not improved on monotherapy)   # If symptoms present > two years, then probable chronic depression which warrants antidepressant or psychotherapy  (ask, In the past 2 years have you felt depressed or sad most days, even if you felt okay sometimes? )  ## If symptoms present > one month or severe functional impairment, consider active treatment.

## 2017-12-26 NOTE — PROGRESS NOTES
Patient with stroke like symptoms and slurred speech and vision disturbances came to hospital.   MRI which described only subtle T2 lengthening in left vern-brian which may represent chronic ischemia.  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 55%  EKG: Sinus tachycardia. Smoker     Sinus rhythm on monitor. Risks of procedure explained which include intubation, dysphagia and death. Denies any C/I to the procedure including any dysphagia, coagulopathy and neck pain. Denies any H/O A fib or being on anticoagulation. Denies any current symptoms of motor weakness. Loose denture in mouth    Allergy to morphine, versed.     Will proceed with JING/CV after discussing with Dr. Holly Patino

## 2017-12-26 NOTE — PROGRESS NOTES
substance abuse. Patient also denies any recent fevers/chills, nausea/vomiting difficulty breathing/chest pain, abdominal pain or any  symptoms. LDL is pending. Random blood glucose borderline elevated; A1c pending. Urine tox screen positive for cannabinoids. Alcohol level less than 10. EKG sinus tachycardia rate 108 with no acute ST-T changes. No current facility-administered medications on file prior to encounter. No current outpatient prescriptions on file prior to encounter. Allergies: Eric Davila has No Known Allergies. Past Medical History:   Diagnosis Date    C[facility]brovascular accident (CVA) ([facility] [address]     Hyperlipidemia 12/23/2017       History reviewed. No pertinent surgical history.         Medications:     clopidogrel  75 mg Oral Daily    aspirin  81 mg Oral Daily    atorvastatin  80 mg Oral Nightly    sodium chloride flush  10 mL Intravenous 2 times per day     PRN Meds include: oxyCODONE-acetaminophen, sodium chloride flush, acetaminophen, magnesium hydroxide, ondansetron    Objective:   BP (!) 111/54   Pulse 79   Temp 97.3 °F (36.3 °C) (Oral)   Resp 21   Ht 5' 8\" (1.727 m)   Wt 159 lb 11.2 oz (72.4 kg)   SpO2 99%   BMI 24.28 kg/m²     Blood pressure range: Systolic (97TBU), EYT:542 , Min:107 , VFF:065   ; Diastolic (14GHM), NTZ:37, Min:54, Max:85        NEUROLOGIC EXAMINATION  GENERAL  Appears comfortable and in no distress   HEENT  NC/ AT   cardiovascular  S1 and S2 heard; palpation of pulses: radial pulse    NECK  Supple and no bruits heard   MENTAL STATUS:  Alert, oriented, intact memory, no confusion, normal speech, normal language, no hallucination or delusion   CRANIAL NERVES: II     -      PERRLA, optic discs; clear; posterior segments  Visual fields intact to confrontation  III,IV,VI -  EOMs full, no afferent defect, no CHEN, no ptosis  V     -     Normal facial sensation   VII    -     Normal facial symmetry  VIII   -     Intact hearing   IX,X - you.

## 2017-12-27 VITALS
BODY MASS INDEX: 24.2 KG/M2 | TEMPERATURE: 97.6 F | DIASTOLIC BLOOD PRESSURE: 80 MMHG | SYSTOLIC BLOOD PRESSURE: 114 MMHG | OXYGEN SATURATION: 96 % | HEIGHT: 68 IN | HEART RATE: 78 BPM | RESPIRATION RATE: 19 BRPM | WEIGHT: 159.7 LBS

## 2017-12-27 PROCEDURE — 99233 SBSQ HOSP IP/OBS HIGH 50: CPT | Performed by: INTERNAL MEDICINE

## 2017-12-27 PROCEDURE — 2580000003 HC RX 258: Performed by: STUDENT IN AN ORGANIZED HEALTH CARE EDUCATION/TRAINING PROGRAM

## 2017-12-27 PROCEDURE — 99232 SBSQ HOSP IP/OBS MODERATE 35: CPT | Performed by: PSYCHIATRY & NEUROLOGY

## 2017-12-27 PROCEDURE — 6370000000 HC RX 637 (ALT 250 FOR IP): Performed by: NURSE PRACTITIONER

## 2017-12-27 PROCEDURE — 6370000000 HC RX 637 (ALT 250 FOR IP): Performed by: INTERNAL MEDICINE

## 2017-12-27 RX ORDER — ATORVASTATIN CALCIUM 80 MG/1
80 TABLET, FILM COATED ORAL NIGHTLY
Qty: 30 TABLET | Refills: 3 | Status: SHIPPED | OUTPATIENT
Start: 2017-12-27 | End: 2018-03-08

## 2017-12-27 RX ORDER — OXYCODONE HYDROCHLORIDE AND ACETAMINOPHEN 5; 325 MG/1; MG/1
1 TABLET ORAL EVERY 6 HOURS PRN
Qty: 28 TABLET | Refills: 0 | Status: SHIPPED | OUTPATIENT
Start: 2017-12-27 | End: 2018-01-03

## 2017-12-27 RX ORDER — OXYCODONE HYDROCHLORIDE AND ACETAMINOPHEN 5; 325 MG/1; MG/1
1 TABLET ORAL EVERY 6 HOURS PRN
Qty: 28 TABLET | Refills: 0 | Status: CANCELLED | OUTPATIENT
Start: 2017-12-27 | End: 2018-01-03

## 2017-12-27 RX ORDER — OXYCODONE HYDROCHLORIDE AND ACETAMINOPHEN 5; 325 MG/1; MG/1
1 TABLET ORAL EVERY 6 HOURS PRN
Qty: 28 TABLET | Refills: 0 | Status: SHIPPED | OUTPATIENT
Start: 2017-12-27 | End: 2017-12-27

## 2017-12-27 RX ORDER — CLOPIDOGREL BISULFATE 75 MG/1
75 TABLET ORAL DAILY
Qty: 18 TABLET | Refills: 0 | Status: SHIPPED | OUTPATIENT
Start: 2017-12-27 | End: 2017-12-27

## 2017-12-27 RX ORDER — ASPIRIN 81 MG/1
81 TABLET, CHEWABLE ORAL DAILY
Qty: 30 TABLET | Refills: 3 | Status: SHIPPED | OUTPATIENT
Start: 2017-12-27 | End: 2018-03-08

## 2017-12-27 RX ORDER — UREA 10 %
2 LOTION (ML) TOPICAL ONCE
Status: DISCONTINUED | OUTPATIENT
Start: 2017-12-27 | End: 2017-12-27 | Stop reason: HOSPADM

## 2017-12-27 RX ORDER — CLOPIDOGREL BISULFATE 75 MG/1
75 TABLET ORAL DAILY
Qty: 18 TABLET | Refills: 0 | Status: SHIPPED | OUTPATIENT
Start: 2017-12-27 | End: 2018-03-08 | Stop reason: ALTCHOICE

## 2017-12-27 RX ADMIN — Medication 10 ML: at 09:27

## 2017-12-27 RX ADMIN — CLOPIDOGREL 75 MG: 75 TABLET, FILM COATED ORAL at 09:26

## 2017-12-27 RX ADMIN — ASPIRIN 81 MG 81 MG: 81 TABLET ORAL at 09:26

## 2017-12-27 RX ADMIN — OXYCODONE HYDROCHLORIDE AND ACETAMINOPHEN 1 TABLET: 5; 325 TABLET ORAL at 09:26

## 2017-12-27 ASSESSMENT — PAIN SCALES - GENERAL: PAINLEVEL_OUTOF10: 10

## 2017-12-27 NOTE — PROGRESS NOTES
EOMs full, no afferent defect, no CHEN, no ptosis  V     -     Normal facial sensation   VII    -     Normal facial symmetry  VIII   -     Intact hearing   IX,X -     Symmetrical palate  XI    -     Symmetrical shoulder shrug  XII   -     Midline tongue, no atrophy    MOTOR FUNCTION:  significant for good strength of grade 5/5 in bilateral proximal and distal muscle groups of both upper and lower extremities with normal bulk, normal tone and no involuntary movements, no tremor   SENSORY FUNCTION:  Normal touch, normal pin, normal vibration, normal proprioception   CEREBELLAR FUNCTION:  Intact fine motor control over upper limbs   REFLEX FUNCTION:  Symmetric, no perverted reflex, no Babinski sign   STATION and GAIT  Not tested         Data:    Lab Results:   CBC:   No results for input(s): WBC, HGB, PLT in the last 72 hours. BMP:    No results for input(s): NA, K, CL, CO2, BUN, CREATININE, GLUCOSE in the last 72 hours. 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 MRI brain no acute intracranial pathology but T2-hyperintensity in left brian noted  MRA head thrombosis of left vertebral artery and basilar artery less conspicuous than CTA    Toxicology (12/22/17): Positive for cannabinoid  Hypercoagulable workup:   ACL Ab: negative. APL Ab: negative  Protein C antigen: Normal.,  Protein S and is in normal  Factor V Leiden assay: Pending  Prothrombin Gene mutation: pending. JING (12/26/17): No clot or thrombus identified. Left atrium normal in size. EF 55%. No LV apical thrombus identified.   No intracardiac shunt by color Doppler. No intracardiac shunt by injection of agitated saline. Impression and Plan: Mr. Heaven Bowles is a 36 y.o. 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 Hyper coag workup in progress as above. JING unremarkable. Elias Render to discharge from neurological perspective with follow-up appointment in neurology clinic in 4-6 weeks.

## 2017-12-27 NOTE — PLAN OF CARE
Problem: HEMODYNAMIC STATUS  Goal: Patient has stable vital signs and fluid balance  Outcome: Ongoing  Neuro assessment completed, fall precautions in place, aspirations precautions in place, assess for barriers in communication and mobility, interventions to assist in communication and mobility in place, encouraged to call for assistance, adaptive devices used as needed, assess emotional state and support offered, encouraged patient to communicate by available means, and support systems included in patient care.

## 2017-12-27 NOTE — PROGRESS NOTES
lower extremity weakness, Improving   Lungs:  clear to auscultation, no wheezes, rales or rhonchi, symmetric air entry  Heart[de-identified] normal rate, regular rhythm, normal S1, S2, no murmurs, rubs, clicks or gallops  Abdomen:  soft, nontender, nondistended, no masses or organomegaly  Extremities: peripheral pulses normal, no pedal edema, no clubbing or cyanosis       Medications:   Scheduled Medications   melatonin  2 mg Oral Once    sodium chloride flush  10 mL Intravenous 2 times per day    clopidogrel  75 mg Oral Daily    aspirin  81 mg Oral Daily    atorvastatin  80 mg Oral Nightly    sodium chloride flush  10 mL Intravenous 2 times per day       PRN Medications    sodium chloride flush 10 mL PRN   oxyCODONE-acetaminophen 1 tablet Q6H PRN   sodium chloride flush 10 mL PRN   acetaminophen 650 mg Q4H PRN   magnesium hydroxide 30 mL Daily PRN   ondansetron 4 mg Q6H PRN       Diagnostic Labs and Imaging    CBC:  No results for input(s): WBC, HGB, PLT in the last 72 hours. BMP:   No results for input(s): NA, K, CL, CO2, BUN, CREATININE, GLUCOSE in the last 72 hours. Hepatic: No results for input(s): AST, ALT, ALB, BILITOT, ALKPHOS in the last 72 hours. INR:   No results for input(s): INR in the last 72 hours. Troponin: No results for input(s): TROPONINI in the last 72 hours. Assessment and Plan: Active Problems:    Stroke-like symptoms    Marijuana abuse    Hx of TIA (transient ischemic attack) and stroke    Other hyperlipidemia    Cerebrovascular accident (CVA) ([facility] [address]    Vertebrobasilar artery insufficiency      1. Stroke: CTA showed narrowing of basilar artery. No intervention as per neuro endovascular. On ASA, Statin. Protein C and Protein S and anti phospholipid abs negative. JING was normal.     2. Hyperlipidemia: On Statin.           Jennifer Browne, PGY-1, Internal Medicine Resident  [facility]  Attending Physician Statement  I have discussed the care of Moraima Au including pertinent history and exam findings,  with the resident. I have seen and examined the patient and the key elements of all parts of the encounter have been performed by me. I agree with the assessment, plan and orders as documented by the resident.      Wilmer Martin MD  12/27/2017  11:47 AM

## 2017-12-29 LAB
FACTOR V LEIDEN MUTATION: NORMAL
PROTHROMBIN G20210A MUTATION: NORMAL

## 2018-01-02 NOTE — DISCHARGE SUMMARY
Dea Graff MD Resident Cosign Needed Internal Medicine  Progress Notes Date of Service: 2017 12:32 PM        []Hide copied text  []Coty for attribution information    [facility]      Department of Internal Medicine - Staff Internal Medicine Service     INPATIENT DISCHARGE SUMMARY        PATIENT IDENTIFICATION:  NAME:                        Radha Camarena   :                           1975  MRN:                           [id number]                                    Acct:                           [id number]   Admit Date:                2017  Discharge date:         2017 12:32 PM   Attending Provider:  No att. providers found                                      REASON FOR HOSPITALIZATION:   Home      DIAGNOSES:  Primary:   Stroke-like symptoms [R29.90]     Secondary: Active Hospital Problems     Diagnosis Date Noted    Vertebrobasilar artery insufficiency [G45.0]      Marijuana abuse [F12.10] 2017    Hx of TIA (transient ischemic attack) and stroke [Z86.73] 2017    Other hyperlipidemia [E78.4] 2017    Cerebrovascular accident (CVA) ([facility] [address] [I63.9]      Stroke-like symptoms [R29.90] 2017         TREATMENT:  Brief Inpatient Course:   Radha Camarena is a 36 y.o. male who presented with complain of visual disturbances and right sided upper and lower extremity weakness. CT head was negative for stroke and CTA head showed partial thrombosis of basilar artery. MRI showed lengthening of left vern brian. Endovascular was consulted and no intervention from them. Hypercoaguable workup was negative. Patient had JING which did show mild MR without any thrombus.    Patient was complaining of right hand pain and was asking for percocet and it was given.         Consults:   Neuroendovascular      Procedures:  None   Any Hospital Acquired Infections: None      PATIENT'S DISCHARGE CONDITION:       PATIENT/FAMILY INSTRUCTIONS:       Discharge Medication

## 2018-03-08 ENCOUNTER — APPOINTMENT (OUTPATIENT)
Dept: CT IMAGING | Age: 43
End: 2018-03-08
Payer: COMMERCIAL

## 2018-03-08 ENCOUNTER — APPOINTMENT (OUTPATIENT)
Dept: MRI IMAGING | Age: 43
End: 2018-03-08
Payer: COMMERCIAL

## 2018-03-08 ENCOUNTER — HOSPITAL ENCOUNTER (EMERGENCY)
Age: 43
Discharge: HOME | End: 2018-03-08
Attending: EMERGENCY MEDICINE
Payer: COMMERCIAL

## 2018-03-08 ENCOUNTER — APPOINTMENT (OUTPATIENT)
Dept: GENERAL RADIOLOGY | Age: 43
End: 2018-03-08
Payer: COMMERCIAL

## 2018-03-08 VITALS
HEART RATE: 96 BPM | DIASTOLIC BLOOD PRESSURE: 86 MMHG | WEIGHT: 150 LBS | RESPIRATION RATE: 17 BRPM | TEMPERATURE: 98.4 F | SYSTOLIC BLOOD PRESSURE: 164 MMHG | BODY MASS INDEX: 22.81 KG/M2 | OXYGEN SATURATION: 99 %

## 2018-03-08 DIAGNOSIS — R53.1 LEFT-SIDED WEAKNESS: Primary | ICD-10-CM

## 2018-03-08 DIAGNOSIS — Z86.73 HISTORY OF CVA (CEREBROVASCULAR ACCIDENT): ICD-10-CM

## 2018-03-08 LAB
% CKMB: 2 % (ref 0–3.5)
ABSOLUTE EOS #: 0.19 K/UL (ref 0–0.44)
ABSOLUTE IMMATURE GRANULOCYTE: 0.03 K/UL (ref 0–0.3)
ABSOLUTE LYMPH #: 4.06 K/UL (ref 1.1–3.7)
ABSOLUTE MONO #: 0.92 K/UL (ref 0.1–1.2)
ANION GAP SERPL CALCULATED.3IONS-SCNC: 16 MMOL/L (ref 9–17)
BASOPHILS # BLD: 0 % (ref 0–2)
BASOPHILS ABSOLUTE: 0.06 K/UL (ref 0–0.2)
BUN BLDV-MCNC: 11 MG/DL (ref 6–20)
BUN/CREAT BLD: ABNORMAL (ref 9–20)
CALCIUM SERPL-MCNC: 9.6 MG/DL (ref 8.6–10.4)
CHLORIDE BLD-SCNC: 99 MMOL/L (ref 98–107)
CK MB: 1.8 NG/ML
CKMB INTERPRETATION: ABNORMAL
CO2: 23 MMOL/L (ref 20–31)
CREAT SERPL-MCNC: 0.88 MG/DL (ref 0.7–1.2)
DIFFERENTIAL TYPE: ABNORMAL
EKG ATRIAL RATE: 89 BPM
EKG P AXIS: 65 DEGREES
EKG P-R INTERVAL: 140 MS
EKG Q-T INTERVAL: 360 MS
EKG QRS DURATION: 80 MS
EKG QTC CALCULATION (BAZETT): 438 MS
EKG R AXIS: 70 DEGREES
EKG T AXIS: 67 DEGREES
EKG VENTRICULAR RATE: 89 BPM
EOSINOPHILS RELATIVE PERCENT: 1 % (ref 1–4)
GFR AFRICAN AMERICAN: >60 ML/MIN
GFR NON-AFRICAN AMERICAN: >60 ML/MIN
GFR SERPL CREATININE-BSD FRML MDRD: ABNORMAL ML/MIN/{1.73_M2}
GFR SERPL CREATININE-BSD FRML MDRD: ABNORMAL ML/MIN/{1.73_M2}
GLUCOSE BLD-MCNC: 84 MG/DL (ref 70–99)
HCT VFR BLD CALC: 50.4 % (ref 40.7–50.3)
HEMOGLOBIN: 16.6 G/DL (ref 13–17)
IMMATURE GRANULOCYTES: 0 %
INR BLD: 0.9
LYMPHOCYTES # BLD: 30 % (ref 24–43)
MCH RBC QN AUTO: 29 PG (ref 25.2–33.5)
MCHC RBC AUTO-ENTMCNC: 32.9 G/DL (ref 28.4–34.8)
MCV RBC AUTO: 88.1 FL (ref 82.6–102.9)
MONOCYTES # BLD: 7 % (ref 3–12)
MYOGLOBIN: 26 NG/ML (ref 28–72)
NRBC AUTOMATED: 0 PER 100 WBC
PARTIAL THROMBOPLASTIN TIME: 23.9 SEC (ref 20.5–30.5)
PDW BLD-RTO: 12.8 % (ref 11.8–14.4)
PLATELET # BLD: 312 K/UL (ref 138–453)
PLATELET ESTIMATE: ABNORMAL
PMV BLD AUTO: 8.6 FL (ref 8.1–13.5)
POTASSIUM SERPL-SCNC: 4 MMOL/L (ref 3.7–5.3)
PROTHROMBIN TIME: 10.1 SEC (ref 9–12)
RBC # BLD: 5.72 M/UL (ref 4.21–5.77)
RBC # BLD: ABNORMAL 10*6/UL
SEG NEUTROPHILS: 62 % (ref 36–65)
SEGMENTED NEUTROPHILS ABSOLUTE COUNT: 8.31 K/UL (ref 1.5–8.1)
SODIUM BLD-SCNC: 138 MMOL/L (ref 135–144)
TOTAL CK: 92 U/L (ref 39–308)
TROPONIN INTERP: ABNORMAL
TROPONIN T: <0.03 NG/ML
WBC # BLD: 13.6 K/UL (ref 3.5–11.3)
WBC # BLD: ABNORMAL 10*3/UL

## 2018-03-08 PROCEDURE — 70498 CT ANGIOGRAPHY NECK: CPT

## 2018-03-08 PROCEDURE — 82553 CREATINE MB FRACTION: CPT

## 2018-03-08 PROCEDURE — 6370000000 HC RX 637 (ALT 250 FOR IP): Performed by: EMERGENCY MEDICINE

## 2018-03-08 PROCEDURE — 82550 ASSAY OF CK (CPK): CPT

## 2018-03-08 PROCEDURE — 73130 X-RAY EXAM OF HAND: CPT

## 2018-03-08 PROCEDURE — 85025 COMPLETE CBC W/AUTO DIFF WBC: CPT

## 2018-03-08 PROCEDURE — 93005 ELECTROCARDIOGRAM TRACING: CPT

## 2018-03-08 PROCEDURE — 83874 ASSAY OF MYOGLOBIN: CPT

## 2018-03-08 PROCEDURE — 99254 IP/OBS CNSLTJ NEW/EST MOD 60: CPT | Performed by: PSYCHIATRY & NEUROLOGY

## 2018-03-08 PROCEDURE — 2580000003 HC RX 258: Performed by: EMERGENCY MEDICINE

## 2018-03-08 PROCEDURE — 85610 PROTHROMBIN TIME: CPT

## 2018-03-08 PROCEDURE — 99285 EMERGENCY DEPT VISIT HI MDM: CPT

## 2018-03-08 PROCEDURE — 6360000004 HC RX CONTRAST MEDICATION

## 2018-03-08 PROCEDURE — 70551 MRI BRAIN STEM W/O DYE: CPT

## 2018-03-08 PROCEDURE — 84484 ASSAY OF TROPONIN QUANT: CPT

## 2018-03-08 PROCEDURE — 85730 THROMBOPLASTIN TIME PARTIAL: CPT

## 2018-03-08 PROCEDURE — 70496 CT ANGIOGRAPHY HEAD: CPT

## 2018-03-08 PROCEDURE — 80048 BASIC METABOLIC PNL TOTAL CA: CPT

## 2018-03-08 PROCEDURE — 70450 CT HEAD/BRAIN W/O DYE: CPT

## 2018-03-08 RX ORDER — ATORVASTATIN CALCIUM 80 MG/1
80 TABLET, FILM COATED ORAL NIGHTLY
Qty: 30 TABLET | Refills: 0 | Status: SHIPPED | OUTPATIENT
Start: 2018-03-08

## 2018-03-08 RX ORDER — ASPIRIN 81 MG/1
324 TABLET, CHEWABLE ORAL ONCE
Status: COMPLETED | OUTPATIENT
Start: 2018-03-08 | End: 2018-03-08

## 2018-03-08 RX ORDER — ACETAMINOPHEN 325 MG/1
650 TABLET ORAL ONCE
Status: COMPLETED | OUTPATIENT
Start: 2018-03-08 | End: 2018-03-08

## 2018-03-08 RX ORDER — ASPIRIN 81 MG/1
81 TABLET, CHEWABLE ORAL DAILY
Qty: 30 TABLET | Refills: 0 | Status: SHIPPED | OUTPATIENT
Start: 2018-03-08

## 2018-03-08 RX ORDER — 0.9 % SODIUM CHLORIDE 0.9 %
1000 INTRAVENOUS SOLUTION INTRAVENOUS ONCE
Status: COMPLETED | OUTPATIENT
Start: 2018-03-08 | End: 2018-03-08

## 2018-03-08 RX ADMIN — ASPIRIN 81 MG 324 MG: 81 TABLET ORAL at 03:12

## 2018-03-08 RX ADMIN — SODIUM CHLORIDE 1000 ML: 9 INJECTION, SOLUTION INTRAVENOUS at 03:12

## 2018-03-08 RX ADMIN — ACETAMINOPHEN 650 MG: 325 TABLET ORAL at 03:22

## 2018-03-08 RX ADMIN — IOPAMIDOL 90 ML: 755 INJECTION, SOLUTION INTRAVENOUS at 03:08

## 2018-03-08 ASSESSMENT — PAIN DESCRIPTION - FREQUENCY: FREQUENCY: CONTINUOUS

## 2018-03-08 ASSESSMENT — PAIN DESCRIPTION - ORIENTATION: ORIENTATION: LEFT

## 2018-03-08 ASSESSMENT — ENCOUNTER SYMPTOMS
DIARRHEA: 0
VOMITING: 0
CONSTIPATION: 0
SORE THROAT: 0
ABDOMINAL PAIN: 0
SHORTNESS OF BREATH: 0
CHEST TIGHTNESS: 0
NAUSEA: 0

## 2018-03-08 ASSESSMENT — PAIN DESCRIPTION - PAIN TYPE: TYPE: CHRONIC PAIN

## 2018-03-08 ASSESSMENT — PAIN SCALES - GENERAL: PAINLEVEL_OUTOF10: 10

## 2018-03-08 NOTE — ED PROVIDER NOTES
COMPARISON: None. HISTORY: ORDERING SYSTEM PROVIDED HISTORY: stroke FINDINGS: CTA NECK: AORTIC ARCH/ARCH VESSELS: There is a normal branch pattern of the aortic arch. No significant stenosis is seen of the innominate artery or subclavian arteries. CAROTID ARTERIES: The common carotid arteries are normal in appearance without evidence of a flow limiting stenosis. There is mild atherosclerotic change at the carotid bulbs. The internal carotid arteries are otherwise normal in appearance without evidence of a flow limiting stenosis by NASCET criteria. No dissection or arterial injury is seen. VERTEBRAL ARTERIES: The vertebral arteries both arise from the subclavian arteries. The right vertebral artery is patent and normal in caliber throughout. There is focal short-segment moderate stenosis of the left vertebral artery in its mid cervical segment. SOFT TISSUES: The lung apices are clear. No cervical or superior mediastinal lymphadenopathy. The visualized portion of the larynx and pharynx appear unremarkable. The parotid, submandibular and thyroid glands demonstrate no acute abnormality. BONES: The visualized osseous structures appear unremarkable. CTA HEAD: ANTERIOR CIRCULATION: The there is atherosclerotic change of cavernous segments internal carotid artery is with less than 50% stenosis on the left and no stenosis of the right. The anterior cerebral and middle cerebral arteries demonstrate no focal stenosis. POSTERIOR CIRCULATION: The posterior cerebral arteries demonstrate no focal stenosis. The left P1 segment is diminutive, the majority of flow coming from posterior communicating artery. The vertebral and basilar arteries appear unremarkable. BRAIN: No mass effect or midline shift. No abnormal extra-axial fluid collection. Chronic left side basal ganglia/ corona radiata/ thalamus infarct again noted. The gray-white differentiation appears otherwise grossly maintained.      Mild early atherosclerotic

## 2018-03-08 NOTE — ED NOTES
Bed: 25  Expected date:   Expected time:   Means of arrival:   Comments:  [address], RN  03/08/18 2037

## 2018-03-08 NOTE — CONSULTS
accident (CVA) ([facility] [address] and Hyperlipidemia. OBJECTIVE  BP (!) 164/86   Pulse 96   Temp 98.4 °F (36.9 °C) (Oral)   Resp 17   Wt 150 lb (68 kg)   SpO2 99%   BMI 22.81 kg/m²     ROS  CONSTITUTIONAL: negative for fatigue and malaise   EYES: negative for double vision and photophobia    HEENT: negative for tinnitus and sore throat   RESPIRATORY: negative for cough, shortness of breath   CARDIOVASCULAR: negative for chest pain, palpitations   GASTROINTESTINAL: negative for nausea, vomiting   GENITOURINARY: negative for incontinence   MUSCULOSKELETAL: negative for neck or back pain   NEUROLOGICAL: negative for seizures, + confusion, memory loss, slurred speech, LUE weankess   PSYCHIATRIC: negative for agitated     Review of systems otherwise negative. EXAM:    CONSTITUTIONAL:  Well developed, well nourished, alert and oriented x 3, in no acute distress. GCS 15. Nontoxic. + dysarthria. No aphasia. HEAD:  normocephalic, atraumatic    EYES:  PERRLA, EOMI.   ENT:  moist mucous membranes   NECK:  supple, symmetric, no midline tenderness to palpation    BACK:  without midline tenderness, step-offs or deformities    LUNGS:  Equal air entry bilaterally   CARDIOVASCULAR:  normal s1 / s2   ABDOMEN:  Soft, no rigidity   NEUROLOGIC:  Mental Status:  A & O x3,awake             Cranial Nerves:    II: Visual acuity:  normal  II: Visual fields:  abnormal - decreased on the L  III: Pupils:  equal, round, reactive to light  III,IV,VI: Extra Ocular Movements: intact  V: Facial sensation:  abnormal - decreased on L at baseline  V: Corneal reflex:  completed.   intact  VII: Facial strength: intact  VIII: Hearing:  intact  IX: Palate:  intact  XI: Shoulder shrug:  intact  XII: Tongue movement:  normal    Motor Exam:    Drift:  present - LLE  Tone:  normal    Motor exam is symmetrical 5 out of 5 all extremities except for 4/5 in LUE, 4/5 in plantar and doriflexion and 5-/5 in hip of LLE    Sensory:    Touch:    Right Upper Extremity: normal  Left Upper Extremity:  abnormal - decreased  Right Lower Extremity:  normal  Left Lower Extremity:  abnormal - decreased and at baseline    Deep Tendon Reflexes:    Right Bicep:  2+  Left Bicep:  2+  Right Knee:  2+  Left Knee:  2+    Plantar Response:    Right:  equivocal  Left:  equivocal    Clonus:  absent  Garza's:  N/A    Coordination/Dysmetria:  Heel to Shin:  Right:  normal  Left:  normal  Finger to Nose:   Right:  normal  Left:  normal   Dysdiadochokinesia:  N/A    Gait:  Not assessed   SKIN:  no rash        INITIAL NIH STROKE SCALE    Time Performed:  2:25 AM    Administer stroke scale items in the order listed. Record performance in each category after each subscale exam. Do not go back and change scores. Follow directions provided for each exam technique. Scores should reflect what the patient does, not what the clinician thinks the patient can do. The clinician should record answers while administering the exam and work quickly. Except where indicated, the patient should not be coached (i.e., repeated requests to patient to make a special effort). 1a.  Level of consciousness:  0 - alert; keenly responsive  1b. Level of consciousness questions:  0 - answers both questions correctly  1c. Level of consciousness questions:  0 - performs both tasks correctly  2. Best Gaze:  0 - normal  3. Visual:  0 - no visual loss  4. Facial Palsy:  0 - normal symmetric movement  5a. Motor left arm:  0 - no drift, limb holds 90 (or 45) degrees for full 10 seconds  5b. Motor right arm:  0 - no drift, limb holds 90 (or 45) degrees for full 10 seconds  6a. Motor left le - drift; leg falls by the end of the 5 second period but does not hit bed  6b. Motor right le - no drift; leg holds 30 degree position for full 5 seconds  7. Limb Ataxia:  0 - absent  8.     Sensory:  1 - mild to moderate sensory loss; patient feels pinprick is less sharp or is dull on the affected side; there is a loss

## 2018-03-26 ENCOUNTER — HOSPITAL ENCOUNTER (OUTPATIENT)
Dept: OCCUPATIONAL THERAPY | Age: 43
Setting detail: THERAPIES SERIES
Discharge: HOME | End: 2018-03-26
Payer: COMMERCIAL

## 2018-03-26 ENCOUNTER — HOSPITAL ENCOUNTER (OUTPATIENT)
Dept: PHYSICAL THERAPY | Age: 43
Setting detail: THERAPIES SERIES
Discharge: HOME | End: 2018-03-26
Payer: COMMERCIAL

## 2018-03-26 PROCEDURE — 97110 THERAPEUTIC EXERCISES: CPT

## 2018-03-26 PROCEDURE — 97165 OT EVAL LOW COMPLEX 30 MIN: CPT

## 2018-03-26 NOTE — FLOWSHEET NOTE
[x] [facility]        Outpatient Physical                Therapy       [address]       Phone: [phone]       Fax: [phone] [] [facility] at [address] [facility]       Phone: [phone]       Fax: [phone] [] [place] [place] [address]      Phone: [phone]      Fax:  [phone]     Physical Therapy Cancel     Date: 3/26/2018  Patient: Teddy Hdez  : 1975  MRN: [id number]         For today's appointment patient:  [x]  Cancelled  []  Rescheduled appointment  []  No-show     Reason given by patient:  []  Patient ill  []  Conflicting appointment  []  No transportation    []  Conflict with work  []  No reason given  []  Weather related  [x]  Other:      Comments:  Pt referred to PT for post stroke weakness right arm/leg. Pt only had complaints of hand coordination and speech difficulty at times. Pt was then referred to OT and Speech for evaluation. Please re-refer if LE strength or balance becomes an issue.          Electronically signed by: Emily Coronado PT

## 2018-03-26 NOTE — CONSULTS
visits. Home Program Initiated:   [ x ] Written    [  ] Verbal    [  ] Demo   Comprehension of Education: [x] Yes [] No [] Needs Review  [x]Plans /[x] Goals, [x]Risks/ [x] Benefits discussed with [x] Patient []Family    Treatment Plan:  Frequency/Duration:       Times a week, for      Weeks. [x] Therapeutic Exercise 63878 [] Electrical Stim [id number]    []Neuro Re-Ed  11635  [] Written Home Program    [] Iontophoresis 54506  [x] Therapeutic Activities 62497  [x] Manual Therapy 22847  [x] Manual Therapy 89987  [x] Ultrasound 46345   [] Hot Pack/Cold Pack 75874  [] Attended Electrical Stim [id number] [] Massage 94064    [x] Ortho Fit/Train [id number]  [] Ortho Re-Fit/Check  [id number]          Treatment This Date:  [x] Eval        Min.15     [x]  Low Complexity   []  Moderate Complexity   []  High Complexity  []  Re-Evaluation Min. [x] Therapeutic Exercise         Min. 39  [] ADL                          Min.  [] Therapeutic Activity            Min.        Flow Sheet:  Exercise Reps/Time Weight/Level Comments   Theraputty coral 10 Initiated this date   Metal pegs   Initiated this date   Pinch pins with foam blocks red 1 series Initiated this date   writing 2 sentances  Initiated this date                   Total Treatment Time: 39     Time In: 830    Time Out: 80       Electronically signed by SOFIA Hollingsworth on 3/26/2018 at 8:24 AM        Physician Signature: _________________________ Date: _______________  By signing above or cosigning this note, I have reviewed this plan of care and certify a need for medically necessary rehabilitation services.      *PLEASE SIGN ABOVE AND FAX BACK ALL PAGES*

## 2018-04-16 ENCOUNTER — HOSPITAL ENCOUNTER (EMERGENCY)
Age: 43
Discharge: HOME | End: 2018-04-16
Attending: EMERGENCY MEDICINE
Payer: COMMERCIAL

## 2018-04-16 VITALS
SYSTOLIC BLOOD PRESSURE: 129 MMHG | DIASTOLIC BLOOD PRESSURE: 79 MMHG | OXYGEN SATURATION: 96 % | TEMPERATURE: 98.2 F | HEART RATE: 108 BPM | RESPIRATION RATE: 20 BRPM

## 2018-04-16 DIAGNOSIS — J40 BRONCHITIS: Primary | ICD-10-CM

## 2018-04-16 PROCEDURE — 99283 EMERGENCY DEPT VISIT LOW MDM: CPT

## 2018-04-16 PROCEDURE — 6370000000 HC RX 637 (ALT 250 FOR IP): Performed by: EMERGENCY MEDICINE

## 2018-04-16 PROCEDURE — 94640 AIRWAY INHALATION TREATMENT: CPT

## 2018-04-16 RX ORDER — DOXYCYCLINE HYCLATE 100 MG
100 TABLET ORAL ONCE
Status: COMPLETED | OUTPATIENT
Start: 2018-04-16 | End: 2018-04-16

## 2018-04-16 RX ORDER — BENZONATATE 100 MG/1
100 CAPSULE ORAL 3 TIMES DAILY PRN
Qty: 30 CAPSULE | Refills: 0 | Status: SHIPPED | OUTPATIENT
Start: 2018-04-16 | End: 2018-04-23

## 2018-04-16 RX ORDER — BENZONATATE 100 MG/1
100 CAPSULE ORAL 3 TIMES DAILY PRN
Qty: 30 CAPSULE | Refills: 0 | Status: SHIPPED | OUTPATIENT
Start: 2018-04-16 | End: 2018-04-16

## 2018-04-16 RX ORDER — DOXYCYCLINE 100 MG/1
100 TABLET ORAL 2 TIMES DAILY
Qty: 28 TABLET | Refills: 0 | Status: SHIPPED | OUTPATIENT
Start: 2018-04-16 | End: 2018-04-16

## 2018-04-16 RX ORDER — ALBUTEROL SULFATE 90 UG/1
2 AEROSOL, METERED RESPIRATORY (INHALATION) EVERY 6 HOURS PRN
Status: DISCONTINUED | OUTPATIENT
Start: 2018-04-16 | End: 2018-04-16 | Stop reason: HOSPADM

## 2018-04-16 RX ORDER — ALBUTEROL SULFATE 90 UG/1
1-2 AEROSOL, METERED RESPIRATORY (INHALATION) EVERY 4 HOURS PRN
Qty: 1 INHALER | Refills: 0 | Status: SHIPPED | OUTPATIENT
Start: 2018-04-16 | End: 2018-04-16

## 2018-04-16 RX ORDER — DOXYCYCLINE 100 MG/1
100 TABLET ORAL 2 TIMES DAILY
Qty: 28 TABLET | Refills: 0 | Status: SHIPPED | OUTPATIENT
Start: 2018-04-16 | End: 2018-04-30

## 2018-04-16 RX ORDER — ALBUTEROL SULFATE 90 UG/1
1-2 AEROSOL, METERED RESPIRATORY (INHALATION) EVERY 4 HOURS PRN
Qty: 1 INHALER | Refills: 0 | Status: SHIPPED | OUTPATIENT
Start: 2018-04-16

## 2018-04-16 RX ADMIN — DOXYCYCLINE HYCLATE 100 MG: 100 TABLET, COATED ORAL at 15:11

## 2018-04-16 ASSESSMENT — ENCOUNTER SYMPTOMS
VOMITING: 0
COUGH: 1
SHORTNESS OF BREATH: 0
SORE THROAT: 0
ABDOMINAL PAIN: 0
BACK PAIN: 0

## 2018-05-15 ENCOUNTER — HOSPITAL ENCOUNTER (OUTPATIENT)
Age: 43
Discharge: HOME | End: 2018-05-15
Payer: COMMERCIAL

## 2018-05-15 LAB
ABSOLUTE EOS #: 0.17 K/UL (ref 0–0.44)
ABSOLUTE IMMATURE GRANULOCYTE: 0.04 K/UL (ref 0–0.3)
ABSOLUTE LYMPH #: 3.03 K/UL (ref 1.1–3.7)
ABSOLUTE MONO #: 0.84 K/UL (ref 0.1–1.2)
ALBUMIN SERPL-MCNC: 4.2 G/DL (ref 3.5–5.2)
ALBUMIN/GLOBULIN RATIO: 1.2 (ref 1–2.5)
ALP BLD-CCNC: 83 U/L (ref 40–129)
ALT SERPL-CCNC: 23 U/L (ref 5–41)
ANION GAP SERPL CALCULATED.3IONS-SCNC: 12 MMOL/L (ref 9–17)
AST SERPL-CCNC: 16 U/L
BASOPHILS # BLD: 0 % (ref 0–2)
BASOPHILS ABSOLUTE: 0.05 K/UL (ref 0–0.2)
BILIRUB SERPL-MCNC: 0.4 MG/DL (ref 0.3–1.2)
BUN BLDV-MCNC: 13 MG/DL (ref 6–20)
BUN/CREAT BLD: NORMAL (ref 9–20)
CALCIUM SERPL-MCNC: 8.9 MG/DL (ref 8.6–10.4)
CHLORIDE BLD-SCNC: 106 MMOL/L (ref 98–107)
CHOLESTEROL, FASTING: 169 MG/DL
CHOLESTEROL/HDL RATIO: 5.6
CO2: 24 MMOL/L (ref 20–31)
CREAT SERPL-MCNC: 0.79 MG/DL (ref 0.7–1.2)
DIFFERENTIAL TYPE: ABNORMAL
EOSINOPHILS RELATIVE PERCENT: 1 % (ref 1–4)
ESTIMATED AVERAGE GLUCOSE: 120 MG/DL
GFR AFRICAN AMERICAN: >60 ML/MIN
GFR NON-AFRICAN AMERICAN: >60 ML/MIN
GFR SERPL CREATININE-BSD FRML MDRD: NORMAL ML/MIN/{1.73_M2}
GFR SERPL CREATININE-BSD FRML MDRD: NORMAL ML/MIN/{1.73_M2}
GLUCOSE BLD-MCNC: 82 MG/DL (ref 70–99)
HAV IGM SER IA-ACNC: NONREACTIVE
HBA1C MFR BLD: 5.8 % (ref 4–6)
HCT VFR BLD CALC: 48 % (ref 40.7–50.3)
HDLC SERPL-MCNC: 30 MG/DL
HEMOGLOBIN: 15.6 G/DL (ref 13–17)
HEPATITIS B CORE IGM ANTIBODY: NONREACTIVE
HEPATITIS B SURFACE ANTIGEN: NONREACTIVE
HEPATITIS C ANTIBODY: REACTIVE
IMMATURE GRANULOCYTES: 0 %
LDL CHOLESTEROL: 117 MG/DL (ref 0–130)
LYMPHOCYTES # BLD: 25 % (ref 24–43)
MCH RBC QN AUTO: 29.2 PG (ref 25.2–33.5)
MCHC RBC AUTO-ENTMCNC: 32.5 G/DL (ref 28.4–34.8)
MCV RBC AUTO: 89.9 FL (ref 82.6–102.9)
MONOCYTES # BLD: 7 % (ref 3–12)
NRBC AUTOMATED: 0 PER 100 WBC
PDW BLD-RTO: 12.5 % (ref 11.8–14.4)
PLATELET # BLD: 209 K/UL (ref 138–453)
PLATELET ESTIMATE: ABNORMAL
PMV BLD AUTO: 9.1 FL (ref 8.1–13.5)
POTASSIUM SERPL-SCNC: 4.3 MMOL/L (ref 3.7–5.3)
RBC # BLD: 5.34 M/UL (ref 4.21–5.77)
RBC # BLD: ABNORMAL 10*6/UL
SEG NEUTROPHILS: 67 % (ref 36–65)
SEGMENTED NEUTROPHILS ABSOLUTE COUNT: 8.25 K/UL (ref 1.5–8.1)
SODIUM BLD-SCNC: 142 MMOL/L (ref 135–144)
TOTAL PROTEIN: 7.6 G/DL (ref 6.4–8.3)
TRIGLYCERIDE, FASTING: 109 MG/DL
TSH SERPL DL<=0.05 MIU/L-ACNC: 1.1 MIU/L (ref 0.3–5)
VLDLC SERPL CALC-MCNC: ABNORMAL MG/DL (ref 1–30)
WBC # BLD: 12.4 K/UL (ref 3.5–11.3)
WBC # BLD: ABNORMAL 10*3/UL

## 2018-05-15 PROCEDURE — 80061 LIPID PANEL: CPT

## 2018-05-15 PROCEDURE — 87522 HEPATITIS C REVRS TRNSCRPJ: CPT

## 2018-05-15 PROCEDURE — 83036 HEMOGLOBIN GLYCOSYLATED A1C: CPT

## 2018-05-15 PROCEDURE — 84443 ASSAY THYROID STIM HORMONE: CPT

## 2018-05-15 PROCEDURE — 80074 ACUTE HEPATITIS PANEL: CPT

## 2018-05-15 PROCEDURE — 80053 COMPREHEN METABOLIC PANEL: CPT

## 2018-05-15 PROCEDURE — 36415 COLL VENOUS BLD VENIPUNCTURE: CPT

## 2018-05-15 PROCEDURE — 85025 COMPLETE CBC W/AUTO DIFF WBC: CPT

## 2018-05-15 PROCEDURE — 87902 NFCT AGT GNTYP ALYS HEP C: CPT

## 2018-05-15 NOTE — DISCHARGE SUMMARY
[] [place]       Occupational Therapy             1st floor       [address], [place]         Phone: [phone]       Fax: [phone] [] [facility] at            [address] [facility] , [address] [facility]     Phone: [phone]     Fax: [phone]         Occupational Therapy Discharge Note    Date: 5/15/2018      Patient: Ashley Listen  : 1975  MRN: [id number]      Patient: Ashley Listen                      : 1975                      MRN: [id number]                         Physician: Tito PENDLETON  Insurance: [facility] Medicaid No precert required. Limit 30 visits not combined per calendar year. For re eval and additional visits precert is required by fax [phone] or phone [phone]              Medical Diagnosis: I63.9 Stroke        Rehab Codes: Parasthesia R20.2, FM Skills loss R29.8, Weakness M62.81  Onset Date: 3-2-17                  Next Dr. Borja Nam: 18  2-3 times a week for 18 visits  Discharge Status:     [] Pt recovered from conditions. Treatment goals were met. [] Pt received maximum benefit. No further therapy indicated at this time. [] Pt to continue exercise/home instructions independently. [] Therapy interrupted due to:    [x] Pt has 2 or more no shows/cancels, is discontinued per our policy. [] Pt has completed prescribed number of treatment sessions. [] Other:         Electronically signed by: Merlin Maya, OTR/L    If you have any questions or concerns, please don't hesitate to call.   Thank you for your referral.

## 2018-05-16 LAB
DIRECT EXAM: ABNORMAL
Lab: ABNORMAL
SPECIMEN DESCRIPTION: ABNORMAL
STATUS: ABNORMAL

## 2018-05-18 LAB
HCV QUANTITATIVE: NORMAL
HEPATITIS C GENOTYPE: NORMAL

## 2020-01-20 ENCOUNTER — HOSPITAL ENCOUNTER (OUTPATIENT)
Dept: GENERAL RADIOLOGY | Age: 45
Discharge: HOME | End: 2020-01-22
Payer: COMMERCIAL

## 2020-01-20 PROCEDURE — 73130 X-RAY EXAM OF HAND: CPT

## 2020-01-21 LAB
CHOLESTEROL, TOTAL: 310 MG/DL
CHOLESTEROL/HDL RATIO: ABNORMAL
HDLC SERPL-MCNC: 42 MG/DL (ref 35–70)
LDL CHOLESTEROL CALCULATED: 235 MG/DL (ref 0–160)
NONHDLC SERPL-MCNC: ABNORMAL MG/DL
TRIGL SERPL-MCNC: 163 MG/DL
VLDLC SERPL CALC-MCNC: ABNORMAL MG/DL

## 2020-02-28 ENCOUNTER — HOSPITAL ENCOUNTER (OUTPATIENT)
Dept: OCCUPATIONAL THERAPY | Age: 45
Setting detail: THERAPIES SERIES
Discharge: HOME | End: 2020-02-28
Payer: COMMERCIAL

## 2020-02-28 NOTE — PROGRESS NOTES
Therapy                                      No-show Note    Date: 2020  Patient Name: Peg Bermudez    : 1975  (38 y.o.)     MRN: [id number]    Account #: [de-identified]            For today's appointment patient:  []  Cancelled  []  Rescheduled appointment  [x]  No-show for evaluation. Also a no show for evaluation 2-      Comments:   Pt will not be called by facility for further scheduling due to repeated No Show No Call for evaluation. Referring physician will be contacted about repeated no shows.     Signature: Electronically signed by SOFIA Archer on 20 at 1:16 PM

## 2021-07-11 ENCOUNTER — HOSPITAL ENCOUNTER (EMERGENCY)
Age: 46
Discharge: ANOTHER HOSPITAL | End: 2021-07-11
Payer: COMMERCIAL

## 2021-07-11 ENCOUNTER — APPOINTMENT (OUTPATIENT)
Dept: CT IMAGING | Age: 46
End: 2021-07-11
Payer: COMMERCIAL

## 2021-07-11 VITALS
RESPIRATION RATE: 19 BRPM | OXYGEN SATURATION: 92 % | HEART RATE: 91 BPM | HEIGHT: 69 IN | WEIGHT: 159 LBS | SYSTOLIC BLOOD PRESSURE: 117 MMHG | BODY MASS INDEX: 23.55 KG/M2 | DIASTOLIC BLOOD PRESSURE: 71 MMHG | TEMPERATURE: 97.8 F

## 2021-07-11 DIAGNOSIS — F14.10 NONDEPENDENT COCAINE ABUSE (HCC): ICD-10-CM

## 2021-07-11 DIAGNOSIS — F10.920 ACUTE ALCOHOLIC INTOXICATION WITHOUT COMPLICATION (HCC): ICD-10-CM

## 2021-07-11 DIAGNOSIS — S36.039A LACERATION OF SPLEEN, INITIAL ENCOUNTER: Primary | ICD-10-CM

## 2021-07-11 DIAGNOSIS — S22.42XA CLOSED FRACTURE OF MULTIPLE RIBS OF LEFT SIDE, INITIAL ENCOUNTER: ICD-10-CM

## 2021-07-11 LAB
ABO/RH: NORMAL
ALBUMIN SERPL-MCNC: 4.8 G/DL (ref 3.5–4.6)
ALP BLD-CCNC: 94 U/L (ref 35–104)
ALT SERPL-CCNC: 42 U/L (ref 0–41)
AMPHETAMINE SCREEN, URINE: ABNORMAL
ANION GAP SERPL CALCULATED.3IONS-SCNC: 12 MEQ/L (ref 9–15)
ANTIBODY SCREEN: NORMAL
AST SERPL-CCNC: 31 U/L (ref 0–40)
BACTERIA: ABNORMAL /HPF
BARBITURATE SCREEN URINE: ABNORMAL
BASOPHILS ABSOLUTE: 0.1 K/UL (ref 0–0.2)
BASOPHILS RELATIVE PERCENT: 0.4 %
BENZODIAZEPINE SCREEN, URINE: ABNORMAL
BILIRUB SERPL-MCNC: <0.2 MG/DL (ref 0.2–0.7)
BILIRUBIN URINE: NEGATIVE
BLOOD, URINE: ABNORMAL
BUN BLDV-MCNC: 9 MG/DL (ref 6–20)
CALCIUM SERPL-MCNC: 9.4 MG/DL (ref 8.5–9.9)
CANNABINOID SCREEN URINE: ABNORMAL
CHLORIDE BLD-SCNC: 103 MEQ/L (ref 95–107)
CLARITY: CLEAR
CO2: 26 MEQ/L (ref 20–31)
COCAINE METABOLITE SCREEN URINE: POSITIVE
COLOR: YELLOW
CREAT SERPL-MCNC: 0.89 MG/DL (ref 0.7–1.2)
EOSINOPHILS ABSOLUTE: 0.1 K/UL (ref 0–0.7)
EOSINOPHILS RELATIVE PERCENT: 0.4 %
EPITHELIAL CELLS, UA: ABNORMAL /HPF
ETHANOL PERCENT: 0.17 G/DL
ETHANOL: 199 MG/DL (ref 0–0.08)
GFR AFRICAN AMERICAN: >60
GFR AFRICAN AMERICAN: >60
GFR NON-AFRICAN AMERICAN: 59
GFR NON-AFRICAN AMERICAN: >60
GLOBULIN: 3.1 G/DL (ref 2.3–3.5)
GLUCOSE BLD-MCNC: 121 MG/DL (ref 70–99)
GLUCOSE URINE: NEGATIVE MG/DL
HCT VFR BLD CALC: 48.7 % (ref 42–52)
HEMOGLOBIN: 16.7 G/DL (ref 14–18)
KETONES, URINE: NEGATIVE MG/DL
LEUKOCYTE ESTERASE, URINE: NEGATIVE
LYMPHOCYTES ABSOLUTE: 2.3 K/UL (ref 1–4.8)
LYMPHOCYTES RELATIVE PERCENT: 13 %
Lab: ABNORMAL
MCH RBC QN AUTO: 31.5 PG (ref 27–31.3)
MCHC RBC AUTO-ENTMCNC: 34.4 % (ref 33–37)
MCV RBC AUTO: 91.6 FL (ref 80–100)
METHADONE SCREEN, URINE: ABNORMAL
MONOCYTES ABSOLUTE: 0.8 K/UL (ref 0.2–0.8)
MONOCYTES RELATIVE PERCENT: 4.7 %
NEUTROPHILS ABSOLUTE: 14.6 K/UL (ref 1.4–6.5)
NEUTROPHILS RELATIVE PERCENT: 81.5 %
NITRITE, URINE: NEGATIVE
OPIATE SCREEN URINE: POSITIVE
OXYCODONE URINE: ABNORMAL
PDW BLD-RTO: 12.5 % (ref 11.5–14.5)
PERFORMED ON: ABNORMAL
PH UA: 5 (ref 5–9)
PHENCYCLIDINE SCREEN URINE: ABNORMAL
PLATELET # BLD: 241 K/UL (ref 130–400)
POC CREATININE: 1.3 MG/DL (ref 0.9–1.3)
POC SAMPLE TYPE: ABNORMAL
POTASSIUM SERPL-SCNC: 4.3 MEQ/L (ref 3.4–4.9)
PROPOXYPHENE SCREEN: ABNORMAL
PROTEIN UA: NEGATIVE MG/DL
RBC # BLD: 5.31 M/UL (ref 4.7–6.1)
RBC UA: ABNORMAL /HPF (ref 0–2)
RENAL EPITHELIAL, UA: ABNORMAL /HPF
SODIUM BLD-SCNC: 141 MEQ/L (ref 135–144)
SPECIFIC GRAVITY UA: 1.05 (ref 1–1.03)
TOTAL PROTEIN: 7.9 G/DL (ref 6.3–8)
URINE REFLEX TO CULTURE: ABNORMAL
UROBILINOGEN, URINE: 0.2 E.U./DL
WBC # BLD: 17.9 K/UL (ref 4.8–10.8)
WBC UA: ABNORMAL /HPF (ref 0–5)

## 2021-07-11 PROCEDURE — 81001 URINALYSIS AUTO W/SCOPE: CPT

## 2021-07-11 PROCEDURE — 86901 BLOOD TYPING SEROLOGIC RH(D): CPT

## 2021-07-11 PROCEDURE — 96374 THER/PROPH/DIAG INJ IV PUSH: CPT

## 2021-07-11 PROCEDURE — 82077 ASSAY SPEC XCP UR&BREATH IA: CPT

## 2021-07-11 PROCEDURE — 85025 COMPLETE CBC W/AUTO DIFF WBC: CPT

## 2021-07-11 PROCEDURE — 96375 TX/PRO/DX INJ NEW DRUG ADDON: CPT

## 2021-07-11 PROCEDURE — 70450 CT HEAD/BRAIN W/O DYE: CPT

## 2021-07-11 PROCEDURE — 99285 EMERGENCY DEPT VISIT HI MDM: CPT

## 2021-07-11 PROCEDURE — 74177 CT ABD & PELVIS W/CONTRAST: CPT

## 2021-07-11 PROCEDURE — 80053 COMPREHEN METABOLIC PANEL: CPT

## 2021-07-11 PROCEDURE — 96376 TX/PRO/DX INJ SAME DRUG ADON: CPT

## 2021-07-11 PROCEDURE — 80307 DRUG TEST PRSMV CHEM ANLYZR: CPT

## 2021-07-11 PROCEDURE — 71260 CT THORAX DX C+: CPT

## 2021-07-11 PROCEDURE — 36415 COLL VENOUS BLD VENIPUNCTURE: CPT

## 2021-07-11 PROCEDURE — 2580000003 HC RX 258: Performed by: EMERGENCY MEDICINE

## 2021-07-11 PROCEDURE — 72125 CT NECK SPINE W/O DYE: CPT

## 2021-07-11 PROCEDURE — 6360000002 HC RX W HCPCS: Performed by: PHYSICIAN ASSISTANT

## 2021-07-11 PROCEDURE — 86900 BLOOD TYPING SEROLOGIC ABO: CPT

## 2021-07-11 PROCEDURE — 6360000004 HC RX CONTRAST MEDICATION: Performed by: PHYSICIAN ASSISTANT

## 2021-07-11 PROCEDURE — 86850 RBC ANTIBODY SCREEN: CPT

## 2021-07-11 RX ORDER — MORPHINE SULFATE 2 MG/ML
4 INJECTION, SOLUTION INTRAMUSCULAR; INTRAVENOUS ONCE
Status: COMPLETED | OUTPATIENT
Start: 2021-07-11 | End: 2021-07-11

## 2021-07-11 RX ORDER — 0.9 % SODIUM CHLORIDE 0.9 %
1000 INTRAVENOUS SOLUTION INTRAVENOUS ONCE
Status: COMPLETED | OUTPATIENT
Start: 2021-07-11 | End: 2021-07-11

## 2021-07-11 RX ORDER — FENTANYL CITRATE 50 UG/ML
50 INJECTION, SOLUTION INTRAMUSCULAR; INTRAVENOUS ONCE
Status: COMPLETED | OUTPATIENT
Start: 2021-07-11 | End: 2021-07-11

## 2021-07-11 RX ORDER — KETOROLAC TROMETHAMINE 15 MG/ML
15 INJECTION, SOLUTION INTRAMUSCULAR; INTRAVENOUS ONCE
Status: COMPLETED | OUTPATIENT
Start: 2021-07-11 | End: 2021-07-11

## 2021-07-11 RX ORDER — ONDANSETRON 2 MG/ML
4 INJECTION INTRAMUSCULAR; INTRAVENOUS ONCE
Status: COMPLETED | OUTPATIENT
Start: 2021-07-11 | End: 2021-07-11

## 2021-07-11 RX ADMIN — ONDANSETRON 4 MG: 2 INJECTION INTRAMUSCULAR; INTRAVENOUS at 07:15

## 2021-07-11 RX ADMIN — IOPAMIDOL 100 ML: 755 INJECTION, SOLUTION INTRAVENOUS at 08:16

## 2021-07-11 RX ADMIN — FENTANYL CITRATE 50 MCG: 50 INJECTION INTRAMUSCULAR; INTRAVENOUS at 09:29

## 2021-07-11 RX ADMIN — FENTANYL CITRATE 50 MCG: 50 INJECTION, SOLUTION INTRAMUSCULAR; INTRAVENOUS at 08:19

## 2021-07-11 RX ADMIN — KETOROLAC TROMETHAMINE 15 MG: 15 INJECTION, SOLUTION INTRAMUSCULAR; INTRAVENOUS at 08:48

## 2021-07-11 RX ADMIN — SODIUM CHLORIDE 1000 ML: 9 INJECTION, SOLUTION INTRAVENOUS at 09:30

## 2021-07-11 RX ADMIN — IOPAMIDOL 50 ML: 755 INJECTION, SOLUTION INTRAVENOUS at 09:12

## 2021-07-11 RX ADMIN — MORPHINE SULFATE 4 MG: 2 INJECTION, SOLUTION INTRAMUSCULAR; INTRAVENOUS at 07:15

## 2021-07-11 ASSESSMENT — ENCOUNTER SYMPTOMS
ABDOMINAL PAIN: 0
ABDOMINAL DISTENTION: 0
EYE DISCHARGE: 0
CONSTIPATION: 0
SORE THROAT: 0
RHINORRHEA: 0
COLOR CHANGE: 0
SHORTNESS OF BREATH: 0

## 2021-07-11 ASSESSMENT — PAIN SCALES - GENERAL
PAINLEVEL_OUTOF10: 9
PAINLEVEL_OUTOF10: 8
PAINLEVEL_OUTOF10: 10
PAINLEVEL_OUTOF10: 8

## 2021-07-11 ASSESSMENT — PAIN DESCRIPTION - PAIN TYPE: TYPE: ACUTE PAIN

## 2021-07-11 ASSESSMENT — PAIN DESCRIPTION - DESCRIPTORS: DESCRIPTORS: SHARP

## 2021-07-11 ASSESSMENT — PAIN DESCRIPTION - ORIENTATION: ORIENTATION: LEFT

## 2021-07-11 ASSESSMENT — PAIN DESCRIPTION - LOCATION: LOCATION: RIB CAGE

## 2021-07-11 ASSESSMENT — PAIN DESCRIPTION - FREQUENCY: FREQUENCY: CONTINUOUS

## 2021-07-11 NOTE — ED TRIAGE NOTES
Pt states that he was drinking last night and was at his sisters trying to help her and fell down 5 steps. Pt states that he is having left rib pain. Pt states that he had no LOC. Pt states that he had 2 tall boys to drink last night. Incident.  Occurred at [facility]

## 2021-07-11 NOTE — ED PROVIDER NOTES
[facility] ED  eMERGENCY dEPARTMENT eNCOUnter      Pt Name: Konrad Gamez  MRN: [id number]  [place] 1975  Date of evaluation: 7/11/2021  Provider: Cyndy Mistry PA-C    CHIEF COMPLAINT       Chief Complaint   Patient presents with    Chest Pain    Fall         HISTORY OF PRESENT ILLNESS   (Location/Symptom, Timing/Onset,Context/Setting, Quality, Duration, Modifying Factors, Severity)  Note limiting factors. Konrad Gamez is a 51 y.o. male who presents to the emergency department with complaint of left-sided chest wall pain secondary to a fall. Patient states that he was drinking last evening, he states that he lost his balance on the stairs and fell headfirst down the stairs, he states he landed on the left side of his chest, complains of rib pain to the upper chest wall lateral aspect and area of the axilla. Patient denies any loss of consciousness, he denies any head or neck or back pain. There is no numbness or tingling. Patient was ambulatory after the incident. And came in by private vehicle. Patient is rating his current pain is a 11 out of 10 at this time. Past medical history significant for previous CVA, hyperlipidemia. Patient admits to consuming \"2 tall boys\" this evening prior to arrival here    HPI    NursingNotes were reviewed. REVIEW OF SYSTEMS    (2-9 systems for level 4, 10 or more for level 5)     Review of Systems   Constitutional: Negative for activity change and appetite change. HENT: Negative for congestion, ear discharge, ear pain, nosebleeds, rhinorrhea and sore throat. Eyes: Negative for discharge. Respiratory: Negative for shortness of breath. Cardiovascular: Negative for chest pain, palpitations and leg swelling. Left lateral chest wall pain   Gastrointestinal: Negative for abdominal distention, abdominal pain and constipation. Genitourinary: Negative for difficulty urinating and dysuria. Musculoskeletal: Negative for arthralgias. Skin: Negative for color change. Neurological: Negative for dizziness, syncope, numbness and headaches. Psychiatric/Behavioral: Negative for agitation and confusion. Except as noted above the remainder of the review of systems was reviewed and negative. PAST MEDICAL HISTORY     Past Medical History:   Diagnosis Date    Cerebrovascular accident (CVA) ([facility] [address]     Hyperlipidemia 12/23/2017         SURGICALHISTORY     History reviewed. No pertinent surgical history. CURRENT MEDICATIONS       Discharge Medication List as of 7/11/2021 10:25 AM      CONTINUE these medications which have NOT CHANGED    Details   aspirin 81 MG chewable tablet Take 1 tablet by mouth daily, Disp-30 tablet, R-0Print      atorvastatin (LIPITOR) 80 MG tablet Take 1 tablet by mouth nightly, Disp-30 tablet, R-0Print      albuterol sulfate HFA (PROVENTIL HFA) 108 (90 Base) MCG/ACT inhaler Inhale 1-2 puffs into the lungs every 4 hours as needed for Wheezing or Shortness of Breath (Space out to every 6 hours as symptoms improve) Space out to every 6 hours as symptoms improve., Disp-1 Inhaler, R-0Print             ALLERGIES     Patient has no known allergies. FAMILY HISTORY     History reviewed. No pertinent family history.        SOCIAL HISTORY       Social History     Socioeconomic History    Marital status:      Spouse name: None    Number of children: None    Years of education: None    Highest education level: None   Occupational History    None   Tobacco Use    Smoking status: Current Every Day Smoker     Packs/day: 1.00     Types: Cigarettes    Smokeless tobacco: Never Used   Substance and Sexual Activity    Alcohol use: No    Drug use: No    Sexual activity: Not Currently   Other Topics Concern    None   Social History Narrative    None     Social Determinants of Health     Financial Resource Strain:     Difficulty of Paying Living Expenses:    Food Insecurity:     Worried About Running Out of Food in the Last Year:    [address] in the Last Year:    Transportation Needs:     Lack of Transportation (Medical):  Lack of Transportation (Non-Medical):    Physical Activity:     Days of Exercise per Week:     Minutes of Exercise per Session:    Stress:     Feeling of Stress :    Social Connections:     Frequency of Communication with Friends and Family:     Frequency of Social Gatherings with Friends and Family:     Attends Zoroastrian Services:     Active Member of Clubs or Organizations:     Attends Club or Organization Meetings:     Marital Status:    Intimate Partner Violence:     Fear of Current or Ex-Partner:     Emotionally Abused:     Physically Abused:     Sexually Abused:        SCREENINGS    Nba Coma Scale  Eye Opening: Spontaneous  Best Verbal Response: Oriented  Best Motor Response: Obeys commands  Nba Coma Scale Score: 15 @FLOW([id number])@      PHYSICAL EXAM    (up to 7 for level 4, 8 or more for level 5)     ED Triage Vitals [07/11/21 0648]   BP Temp Temp Source Pulse Resp SpO2 Height Weight   (!) 151/134 97.8 °F (36.6 °C) Oral 121 26 99 % 5' 9\" (1.753 m) 159 lb (72.1 kg)       Physical Exam  Vitals and nursing note reviewed. Constitutional:       General: He is not in acute distress. Appearance: He is well-developed. He is not ill-appearing, toxic-appearing or diaphoretic. HENT:      Head: Normocephalic and atraumatic. Comments: Patient's head face and scalp are atraumatic, no cut scrapes abrasions are noted, no deformity, no depressions, no lacerations or bleeding is noted. No pain on palpation. Right Ear: Tympanic membrane normal.      Left Ear: Tympanic membrane normal.      Ears:      Comments: Tympanic membrane's are intact, no bleeding drainage or discharge is noted. Nose: No congestion. Mouth/Throat:      Mouth: Mucous membranes are moist.      Pharynx: No oropharyngeal exudate or posterior oropharyngeal erythema.       Comments: No signs of intraoral injury, strong odor of alcohol to breath. Eyes:      Extraocular Movements: Extraocular movements intact. Conjunctiva/sclera: Conjunctivae normal.      Pupils: Pupils are equal, round, and reactive to light. Comments: Pupils equal round reactive to light, no nystagmus. Neck:      Vascular: No JVD. Trachea: No tracheal deviation. Comments: There is no pain on palpation cervical spine. No midline tenderness, no step-offs, no crepitus, no deformity, full range of motion with no pain. Cardiovascular:      Rate and Rhythm: Normal rate. Pulses: Normal pulses. Heart sounds: Normal heart sounds. No murmur heard. No friction rub. No gallop. Pulmonary:      Effort: Pulmonary effort is normal. No tachypnea, accessory muscle usage, respiratory distress or retractions. Breath sounds: No stridor. No wheezing, rhonchi or rales. Comments: Lung sounds are clear in all fields, there is no wheezes rales or rhonchi, no accessory muscle use, no retractions. Patient complains of pain to the left lateral chest wall and area of the left axilla, there is no crepitus, no instability, no cut scrapes abrasions, no deformity, no paradoxical movement or flail segments. Chest:      Chest wall: No tenderness. Abdominal:      General: Abdomen is flat. Bowel sounds are normal. There is no distension or abdominal bruit. Palpations: Abdomen is soft. There is no shifting dullness, fluid wave, hepatomegaly, splenomegaly, mass or pulsatile mass. Tenderness: There is no abdominal tenderness. There is no right CVA tenderness, left CVA tenderness, guarding or rebound. Negative signs include Rodriguez's sign, Rovsing's sign and McBurney's sign. Comments: Abdomen soft nondistended nontender no guarding mass rebound, no CVA tenderness. Musculoskeletal:         General: No deformity. Arms:       Cervical back: Normal range of motion and neck supple. No rigidity. Comments: Patient is moving all extremities well, he was able to ambulate from the triage area into the exam room without any ataxia limp or foot drop. There is no pain on palpation to thoracic spine, lumbar spine, sacral area, pelvis is stable there is no crepitus or instability, no shortening or rotation of bilateral lower extremities, no femoral pain, no knee pain, no tib-fib pain, no foot or ankle pain bilaterally, lower extremities are neurovascular intact, no pain on palpation to shoulders, humerus, elbows, ulna radius, wrist hand or fingers bilaterally. Minor abrasion noted to right hand at second finger. Upper extremities are neurovascular intact. Skin:     General: Skin is warm and dry. Capillary Refill: Capillary refill takes less than 2 seconds. Coloration: Skin is not jaundiced. Neurological:      General: No focal deficit present. Mental Status: He is alert and oriented to person, place, and time. Mental status is at baseline. Cranial Nerves: No cranial nerve deficit. Sensory: No sensory deficit. Motor: No weakness.       Coordination: Coordination normal.   Psychiatric:         Mood and Affect: Mood normal.         DIAGNOSTIC RESULTS     EKG: All EKG's are interpreted by the Emergency Department Physician who either signs or Co-signsthis chart in the absence of a cardiologist.        RADIOLOGY:   Para [place] such as CT, Ultrasound and MRI are read by the radiologist. Plain radiographic images are visualized and preliminarily interpreted by the emergency physician with the below findings:        Interpretation per the Radiologist below, if available at the time ofthis note:    CT ABDOMEN PELVIS W IV CONTRAST Additional Contrast? None   Final Result   There are lacerations of the spleen measuring greater than 6 cm extending from the splenic hilum to the lateral capsule with rupture of the lateral capsule and high density material, hemorrhage lateral and superior to the spleen. There is a    1.8 cm area of central, acute hemorrhage. The findings are consistent with a grade 3 splenic laceration and capsular rupture. CT Head WO Contrast   Final Result      There is no acute intracranial process. CT CERVICAL SPINE WO CONTRAST   Final Result   There is mild spondylosis without evidence of acute fracture. CT CHEST W CONTRAST   Final Result       There are no focal infiltrates or effusions, there is no pneumothorax. There is dependent atelectasis in both lungs. There are multiple left-sided rib fractures. There is the appearance of a shattered spleen with active hemorrhage, grade 5. The findings were called to ER at 0849 hours and briefly discussed with Dr. Marilyn Bender                        ED BEDSIDE ULTRASOUND:   Performed by ED Physician - none    LABS:  Labs Reviewed   COMPREHENSIVE METABOLIC PANEL - Abnormal; Notable for the following components:       Result Value    Glucose 121 (*)     Albumin 4.8 (*)     ALT 42 (*)     All other components within normal limits   CBC WITH AUTO DIFFERENTIAL - Abnormal; Notable for the following components:    WBC 17.9 (*)     MCH 31.5 (*)     Neutrophils Absolute 14.6 (*)     All other components within normal limits   URINE DRUG SCREEN - Abnormal; Notable for the following components:    Cocaine Metabolite Screen, Urine POSITIVE (*)     Opiate Scrn, Ur POSITIVE (*)     All other components within normal limits   URINE RT REFLEX TO CULTURE - Abnormal; Notable for the following components:    Blood, Urine TRACE (*)     All other components within normal limits   ETHANOL   MICROSCOPIC URINALYSIS   TYPE AND SCREEN       All other labs were within normal range or not returned as of this dictation.     EMERGENCY DEPARTMENT COURSE and DIFFERENTIAL DIAGNOSIS/MDM:   Vitals:    Vitals:    07/11/21 0930 07/11/21 0935 07/11/21 0945 07/11/21 1000   BP: (!) 120/99 (!) 138/106 123/69 117/71   Pulse:  91 Resp: 21 23 21 19   Temp:       TempSrc:       SpO2: 94% 93% 93% 92%   Weight:       Height:            MDM  Number of Diagnoses or Management Options  Acute alcoholic intoxication without complication (HCC)  Closed fracture of multiple ribs of left side, initial encounter  Laceration of spleen, initial encounter  Nondependent cocaine abuse ([facility] [address]  Diagnosis management comments: Patient presents to the ED with complaint of left-sided chest wall pain secondary to a fall which occurred just prior to arrival in ER. Patient states that he had been drinking last night and this morning, he states that he tripped on the stairs and fell landing on his left side of his chest with a primary complaint to the left axillary region. CT scan of the chest was completed with IV contrast which shows multiple left-sided rib fractures, as well as the appearance of a shattered spleen with active hemorrhage grade 5. Call was placed to trauma surgery, I did speak with Dr. Toro Mock, patient will be transferred to [facility] for further evaluation and management, at this time there are no surgery capabilities at [facility] due to water damage within the surgical suites closing. [facility] was notified, and patient will be stat transferred to [facility] for further surgical intervention for grade 5 spleen laceration. 10:08am [facility] Critical care team arrived for transfer      [address]   Total Critical Care time was 45 minutes, excluding separately reportableprocedures. There was a high probability of clinicallysignificant/life threatening deterioration in the patient's condition which required my urgent intervention. CONSULTS:  None    PROCEDURES:  Unless otherwise noted below, none     Procedures    FINAL IMPRESSION      1. Laceration of spleen, initial encounter    2. Closed fracture of multiple ribs of left side, initial encounter    3. Acute alcoholic intoxication without complication ([address]    4. Nondependent cocaine abuse [facility])          DISPOSITION/PLAN   DISPOSITION Decision To Transfer 07/11/2021 09:16:37 AM      PATIENT REFERRED TO:  No follow-up provider specified.     DISCHARGE MEDICATIONS:  Discharge Medication List as of 7/11/2021 10:25 AM             (Please note that portions of this note were completed with a voice recognition program.  Efforts were made to edit the dictations but occasionally words are mis-transcribed.)    Romana Brownie, PA-C (electronically signed)  Attending Emergency Physician         Romana Brownie, PA-C  07/11/21 [address] Emilee Benitez PA-C  07/11/21 1052

## 2021-07-12 LAB
REASON FOR REJECTION: NORMAL
REJECTED TEST: NORMAL

## 2021-07-19 LAB
CHOLESTEROL, TOTAL: 263 MG/DL
CHOLESTEROL/HDL RATIO: ABNORMAL
HDLC SERPL-MCNC: 31 MG/DL (ref 35–70)
LDL CHOLESTEROL CALCULATED: 201 MG/DL (ref 0–160)
NONHDLC SERPL-MCNC: ABNORMAL MG/DL
TRIGL SERPL-MCNC: 163 MG/DL
VLDLC SERPL CALC-MCNC: 31 MG/DL

## 2021-08-20 ENCOUNTER — OFFICE VISIT (OUTPATIENT)
Dept: CARDIOLOGY CLINIC | Age: 46
End: 2021-08-20
Payer: COMMERCIAL

## 2021-08-20 VITALS
HEART RATE: 76 BPM | DIASTOLIC BLOOD PRESSURE: 70 MMHG | WEIGHT: 173.6 LBS | BODY MASS INDEX: 25.64 KG/M2 | SYSTOLIC BLOOD PRESSURE: 110 MMHG

## 2021-08-20 DIAGNOSIS — I10 ESSENTIAL HYPERTENSION: ICD-10-CM

## 2021-08-20 DIAGNOSIS — Z86.73 HX OF TIA (TRANSIENT ISCHEMIC ATTACK) AND STROKE: ICD-10-CM

## 2021-08-20 DIAGNOSIS — E78.5 DYSLIPIDEMIA: ICD-10-CM

## 2021-08-20 DIAGNOSIS — Z00.00 PE (PHYSICAL EXAM), ROUTINE: Primary | ICD-10-CM

## 2021-08-20 PROCEDURE — G8419 CALC BMI OUT NRM PARAM NOF/U: HCPCS | Performed by: INTERNAL MEDICINE

## 2021-08-20 PROCEDURE — 4004F PT TOBACCO SCREEN RCVD TLK: CPT | Performed by: INTERNAL MEDICINE

## 2021-08-20 PROCEDURE — 93000 ELECTROCARDIOGRAM COMPLETE: CPT | Performed by: INTERNAL MEDICINE

## 2021-08-20 PROCEDURE — G8427 DOCREV CUR MEDS BY ELIG CLIN: HCPCS | Performed by: INTERNAL MEDICINE

## 2021-08-20 PROCEDURE — 99204 OFFICE O/P NEW MOD 45 MIN: CPT | Performed by: INTERNAL MEDICINE

## 2021-08-20 ASSESSMENT — ENCOUNTER SYMPTOMS
CHEST TIGHTNESS: 0
WHEEZING: 0
GASTROINTESTINAL NEGATIVE: 1
NAUSEA: 0
STRIDOR: 0
BLOOD IN STOOL: 0
RESPIRATORY NEGATIVE: 1
SHORTNESS OF BREATH: 0
EYES NEGATIVE: 1
COUGH: 0

## 2021-08-20 NOTE — PROGRESS NOTES
NEW PATIENT        Patient: Tahira Stiles  YOB: 1975  MRN: [id number]    Chief Complaint: LDL TIA  Chief Complaint   Patient presents with   [facility] Establish Cardiologist     [address]    Hyperlipidemia       CV Data:  12/2017 12/2017 JING EF 55 no PFO    Subjective/HPI  Pt is asymptomatic. Had TIA in the past JING no pFO. No cp NO sob no bleed. Recently fell  Off stairs- missed a step. And broke Left ribs. Smoker  +ETOH  Work- unemployed. Lives alone. +FH     EKG: SR 86    Past Medical History:   Diagnosis Date    Cerebrovascular accident (CVA) ([address]     Hyperlipidemia 12/23/2017       No past surgical history on file. No family history on file. Social History     Socioeconomic History    Marital status:      Spouse name: Not on file    Number of children: Not on file    Years of education: Not on file    Highest education level: Not on file   Occupational History    Not on file   Tobacco Use    Smoking status: Current Every Day Smoker     Packs/day: 1.00     Types: Cigarettes    Smokeless tobacco: Never Used   Substance and Sexual Activity    Alcohol use: No    Drug use: No    Sexual activity: Not Currently   Other Topics Concern    Not on file   Social History Narrative    Not on file     Social Determinants of Health     Financial Resource Strain:     Difficulty of Paying Living Expenses:    Food Insecurity:     Worried About Running Out of Food in the Last Year:     [address] in the Last Year:    Transportation Needs:     Lack of Transportation (Medical):      Lack of Transportation (Non-Medical):    Physical Activity:     Days of Exercise per Week:     Minutes of Exercise per Session:    Stress:     Feeling of Stress :    Social Connections:     Frequency of Communication with Friends and Family:     Frequency of Social Gatherings with Friends and Family:     Attends Orthodoxy Services:     Active Member of [employer] or Organizations:     Attends Club or Organization Meetings:     Marital Status:    Intimate Partner Violence:     Fear of Current or Ex-Partner:     Emotionally Abused:     Physically Abused:     Sexually Abused:        No Known Allergies    Current Outpatient Medications   Medication Sig Dispense Refill    albuterol sulfate HFA (PROVENTIL HFA) 108 (90 Base) MCG/ACT inhaler Inhale 1-2 puffs into the lungs every 4 hours as needed for Wheezing or Shortness of Breath (Space out to every 6 hours as symptoms improve) Space out to every 6 hours as symptoms improve. 1 Inhaler 0    aspirin 81 MG chewable tablet Take 1 tablet by mouth daily 30 tablet 0    atorvastatin (LIPITOR) 80 MG tablet Take 1 tablet by mouth nightly 30 tablet 0     No current facility-administered medications for this visit. Review of Systems:   Review of Systems   Constitutional: Negative. Negative for diaphoresis and fatigue. HENT: Negative. Eyes: Negative. Respiratory: Negative. Negative for cough, chest tightness, shortness of breath, wheezing and stridor. Cardiovascular: Negative. Negative for chest pain, palpitations and leg swelling. Gastrointestinal: Negative. Negative for blood in stool and nausea. Genitourinary: Negative. Musculoskeletal: Negative. Skin: Negative. Neurological: Negative. Negative for dizziness, syncope, weakness and light-headedness. Hematological: Negative. Psychiatric/Behavioral: Negative. Physical Examination:    /70 (Site: Left Upper Arm, Position: Sitting, Cuff Size: Large Adult)   Pulse 76   Wt 173 lb 9.6 oz (78.7 kg)   BMI 25.64 kg/m²    Physical Exam   Constitutional: He appears healthy. No distress. HENT:   Normal cephalic and Atraumatic   Eyes: Pupils are equal, round, and reactive to light. Neck: Thyroid normal. No JVD present. No neck adenopathy. No thyromegaly present.    Cardiovascular: Normal rate, regular rhythm, normal heart sounds, intact distal pulses and normal pulses. Pulmonary/Chest: Effort normal and breath sounds normal. He has no wheezes. He has no rales. He exhibits no tenderness. Abdominal: Soft. Bowel sounds are normal. There is no abdominal tenderness. Musculoskeletal:         General: No tenderness or edema. Normal range of motion. Cervical back: Normal range of motion and neck supple. Neurological: He is alert and oriented to person, place, and time. Skin: Skin is warm. No cyanosis. Nails show no clubbing.        LABS:  CBC:   Lab Results   Component Value Date    WBC 17.9 07/11/2021    RBC 5.31 07/11/2021    HGB 16.7 07/11/2021    HCT 48.7 07/11/2021    MCV 91.6 07/11/2021    MCH 31.5 07/11/2021    MCHC 34.4 07/11/2021    RDW 12.5 07/11/2021     07/11/2021    MPV 9.1 05/15/2018     Lipids:  Lab Results   Component Value Date    CHOL 263 07/19/2021    CHOL 310 (H) 01/21/2020    CHOL 310 01/21/2020     Lab Results   Component Value Date    TRIG 163 07/19/2021    TRIG 163 (H) 01/21/2020    TRIG 163 01/21/2020     Lab Results   Component Value Date    HDL 31 (A) 07/19/2021    HDL 42 01/21/2020    HDL 42 01/21/2020     Lab Results   Component Value Date    LDLCHOLESTEROL 117 05/15/2018    LDLCHOLESTEROL 158 (H) 12/23/2017    LDLCALC 201 (A) 07/19/2021    LDLCALC 235 (H) 01/21/2020    LDLCALC 235 (A) 01/21/2020     Lab Results   Component Value Date    VLDL 31 07/19/2021    VLDL NOT REPORTED 05/15/2018    VLDL NOT REPORTED 12/23/2017     Lab Results   Component Value Date    CHOLHDLRATIO 5.6 (H) 05/15/2018    CHOLHDLRATIO 7.7 (H) 12/23/2017     CMP:    Lab Results   Component Value Date     07/11/2021    K 4.3 07/11/2021     07/11/2021    CO2 26 07/11/2021    BUN 9 07/11/2021    CREATININE 1.3 07/11/2021    CREATININE 0.89 07/11/2021    GFRAA >60 07/11/2021    LABGLOM 59 07/11/2021    GLUCOSE 121 07/11/2021    PROT 7.9 07/11/2021    LABALBU 4.8 07/11/2021    CALCIUM 9.4 07/11/2021    BILITOT <0.2 07/11/2021 ALKPHOS 94 07/11/2021    AST 31 07/11/2021    ALT 42 07/11/2021     BMP:    Lab Results   Component Value Date     07/11/2021    K 4.3 07/11/2021     07/11/2021    CO2 26 07/11/2021    BUN 9 07/11/2021    LABALBU 4.8 07/11/2021    CREATININE 1.3 07/11/2021    CREATININE 0.89 07/11/2021    CALCIUM 9.4 07/11/2021    GFRAA >60 07/11/2021    LABGLOM 59 07/11/2021    GLUCOSE 121 07/11/2021     Magnesium:  No results found for: MG  TSH:  Lab Results   Component Value Date    TSH 1.10 05/15/2018             Patient Active Problem List   Diagnosis    Stroke-like symptoms    Marijuana abuse    Hx of TIA (transient ischemic attack) and stroke    Other hyperlipidemia    Chronic ischemic vertebrobasilar artery brainstem stroke    Vertebrobasilar artery insufficiency    Left-sided weakness    Vertebral artery stenosis, left       There are no discontinued medications. Modified Medications    No medications on file       No orders of the defined types were placed in this encounter. Assessment/Plan:    1. PE (physical exam), routine     - EKG 12 Lead    2. Essential hypertension  Stable on meds. Continue meds. Low salt. 3. Dyslipidemia   on Max dose Atorvastin. LDL remains 200. He has severe FH heart dz. Will start injectable Repatha. Refer to dietician. 4. Hx of TIA (transient ischemic attack) and stroke   asa statin    5. Stop smoking. Counseling:  Heart Healthy Lifestyle, Stop Smoking, Low Salt Diet, Increase Nutritional Intake and Walk Daily    Return in about 3 months (around 11/20/2021).     Electronically signed by Diogenes Gallagher MD on 8/20/2021 at 3:34 PM

## 2022-04-26 ENCOUNTER — APPOINTMENT (OUTPATIENT)
Dept: CT IMAGING | Age: 47
End: 2022-04-26
Payer: COMMERCIAL

## 2022-04-26 ENCOUNTER — HOSPITAL ENCOUNTER (EMERGENCY)
Age: 47
Discharge: HOME | End: 2022-04-26
Attending: STUDENT IN AN ORGANIZED HEALTH CARE EDUCATION/TRAINING PROGRAM
Payer: COMMERCIAL

## 2022-04-26 VITALS
HEIGHT: 69 IN | DIASTOLIC BLOOD PRESSURE: 75 MMHG | HEART RATE: 85 BPM | RESPIRATION RATE: 18 BRPM | WEIGHT: 160 LBS | TEMPERATURE: 97.7 F | SYSTOLIC BLOOD PRESSURE: 120 MMHG | BODY MASS INDEX: 23.7 KG/M2 | OXYGEN SATURATION: 92 %

## 2022-04-26 DIAGNOSIS — R06.4 HYPERVENTILATION: ICD-10-CM

## 2022-04-26 DIAGNOSIS — F10.929 ACUTE ALCOHOLIC INTOXICATION WITH COMPLICATION (HCC): ICD-10-CM

## 2022-04-26 DIAGNOSIS — R10.12 ABDOMINAL PAIN, LEFT UPPER QUADRANT: ICD-10-CM

## 2022-04-26 DIAGNOSIS — F17.200 TOBACCO DEPENDENCE: ICD-10-CM

## 2022-04-26 DIAGNOSIS — K29.21 ACUTE ALCOHOLIC GASTRITIS WITH HEMORRHAGE: Primary | ICD-10-CM

## 2022-04-26 DIAGNOSIS — F14.10 COCAINE ABUSE (HCC): ICD-10-CM

## 2022-04-26 DIAGNOSIS — E86.0 MILD DEHYDRATION: ICD-10-CM

## 2022-04-26 LAB
ALBUMIN SERPL-MCNC: 5.3 G/DL (ref 3.5–4.6)
ALP BLD-CCNC: 105 U/L (ref 35–104)
ALT SERPL-CCNC: 42 U/L (ref 0–41)
AMPHETAMINE SCREEN, URINE: ABNORMAL
ANION GAP SERPL CALCULATED.3IONS-SCNC: 15 MEQ/L (ref 9–15)
APTT: 27.3 SEC (ref 24.4–36.8)
AST SERPL-CCNC: 29 U/L (ref 0–40)
BARBITURATE SCREEN URINE: ABNORMAL
BASE EXCESS VENOUS: 0 (ref -3–3)
BASOPHILS ABSOLUTE: 0.2 K/UL (ref 0–0.2)
BASOPHILS RELATIVE PERCENT: 1.5 %
BENZODIAZEPINE SCREEN, URINE: ABNORMAL
BILIRUB SERPL-MCNC: <0.2 MG/DL (ref 0.2–0.7)
BILIRUBIN URINE: NEGATIVE
BLOOD, URINE: NEGATIVE
BUN BLDV-MCNC: 11 MG/DL (ref 6–20)
CALCIUM IONIZED: 1.12 MMOL/L (ref 1.12–1.32)
CALCIUM SERPL-MCNC: 10.1 MG/DL (ref 8.5–9.9)
CANNABINOID SCREEN URINE: ABNORMAL
CHLORIDE BLD-SCNC: 99 MEQ/L (ref 95–107)
CLARITY: CLEAR
CO2: 26 MEQ/L (ref 20–31)
COCAINE METABOLITE SCREEN URINE: POSITIVE
COLOR: YELLOW
CREAT SERPL-MCNC: 0.93 MG/DL (ref 0.7–1.2)
EKG ATRIAL RATE: 85 BPM
EKG P AXIS: 44 DEGREES
EKG P-R INTERVAL: 162 MS
EKG Q-T INTERVAL: 388 MS
EKG QRS DURATION: 86 MS
EKG QTC CALCULATION (BAZETT): 461 MS
EKG R AXIS: 16 DEGREES
EKG T AXIS: 34 DEGREES
EKG VENTRICULAR RATE: 85 BPM
EOSINOPHILS ABSOLUTE: 0.1 K/UL (ref 0–0.7)
EOSINOPHILS RELATIVE PERCENT: 0.4 %
ETHANOL PERCENT: 0.24 G/DL
ETHANOL: 276 MG/DL (ref 0–0.08)
GFR AFRICAN AMERICAN: >60
GFR AFRICAN AMERICAN: >60
GFR NON-AFRICAN AMERICAN: >60
GFR NON-AFRICAN AMERICAN: >60
GLOBULIN: 3.8 G/DL (ref 2.3–3.5)
GLUCOSE BLD-MCNC: 112 MG/DL (ref 70–99)
GLUCOSE BLD-MCNC: 115 MG/DL (ref 70–99)
GLUCOSE URINE: NEGATIVE MG/DL
HCO3 VENOUS: 26.2 MMOL/L (ref 23–29)
HCT VFR BLD CALC: 53.8 % (ref 42–52)
HEMOGLOBIN: 18.3 G/DL (ref 14–18)
HEMOGLOBIN: 20 GM/DL (ref 13.5–17.5)
INR BLD: 1
KETONES, URINE: NEGATIVE MG/DL
LACTATE: 2.49 MMOL/L (ref 0.4–2)
LACTIC ACID: 2.3 MMOL/L (ref 0.5–2.2)
LEUKOCYTE ESTERASE, URINE: NEGATIVE
LIPASE: 26 U/L (ref 12–95)
LYMPHOCYTES ABSOLUTE: 2.9 K/UL (ref 1–4.8)
LYMPHOCYTES RELATIVE PERCENT: 21.8 %
Lab: ABNORMAL
MAGNESIUM: 2.2 MG/DL (ref 1.7–2.4)
MCH RBC QN AUTO: 31.9 PG (ref 27–31.3)
MCHC RBC AUTO-ENTMCNC: 34.1 % (ref 33–37)
MCV RBC AUTO: 93.6 FL (ref 80–100)
METHADONE SCREEN, URINE: ABNORMAL
MONOCYTES ABSOLUTE: 0.6 K/UL (ref 0.2–0.8)
MONOCYTES RELATIVE PERCENT: 4.3 %
NEUTROPHILS ABSOLUTE: 9.6 K/UL (ref 1.4–6.5)
NEUTROPHILS RELATIVE PERCENT: 72 %
NITRITE, URINE: NEGATIVE
O2 SAT, VEN: 33 %
OPIATE SCREEN URINE: ABNORMAL
OXYCODONE URINE: ABNORMAL
PCO2, VEN: 52.2 MM HG (ref 40–50)
PDW BLD-RTO: 12.8 % (ref 11.5–14.5)
PERFORMED ON: ABNORMAL
PH UA: 5 (ref 5–9)
PH VENOUS: 7.31 (ref 7.32–7.42)
PHENCYCLIDINE SCREEN URINE: ABNORMAL
PLATELET # BLD: 322 K/UL (ref 130–400)
PO2, VEN: 23 MM HG
POC CHLORIDE: 106 MEQ/L (ref 99–110)
POC CREATININE: 0.9 MG/DL (ref 0.8–1.3)
POC HEMATOCRIT: 59 % (ref 41–53)
POC POTASSIUM: 4.4 MEQ/L (ref 3.5–5.1)
POC SAMPLE TYPE: ABNORMAL
POC SODIUM: 141 MEQ/L (ref 136–145)
POTASSIUM SERPL-SCNC: 4.7 MEQ/L (ref 3.4–4.9)
PROPOXYPHENE SCREEN: ABNORMAL
PROTEIN UA: NEGATIVE MG/DL
PROTHROMBIN TIME: 12.9 SEC (ref 12.3–14.9)
RBC # BLD: 5.75 M/UL (ref 4.7–6.1)
SODIUM BLD-SCNC: 140 MEQ/L (ref 135–144)
SPECIFIC GRAVITY UA: 1 (ref 1–1.03)
TCO2 CALC VENOUS: 28 MMOL/L
TOTAL CK: 280 U/L (ref 0–190)
TOTAL PROTEIN: 9.1 G/DL (ref 6.3–8)
URINE REFLEX TO CULTURE: NORMAL
UROBILINOGEN, URINE: 0.2 E.U./DL
WBC # BLD: 13.4 K/UL (ref 4.8–10.8)

## 2022-04-26 PROCEDURE — 96368 THER/DIAG CONCURRENT INF: CPT

## 2022-04-26 PROCEDURE — 83690 ASSAY OF LIPASE: CPT

## 2022-04-26 PROCEDURE — 82565 ASSAY OF CREATININE: CPT

## 2022-04-26 PROCEDURE — 85610 PROTHROMBIN TIME: CPT

## 2022-04-26 PROCEDURE — 83605 ASSAY OF LACTIC ACID: CPT

## 2022-04-26 PROCEDURE — 85014 HEMATOCRIT: CPT

## 2022-04-26 PROCEDURE — 82550 ASSAY OF CK (CPK): CPT

## 2022-04-26 PROCEDURE — 36600 WITHDRAWAL OF ARTERIAL BLOOD: CPT

## 2022-04-26 PROCEDURE — 6360000004 HC RX CONTRAST MEDICATION: Performed by: STUDENT IN AN ORGANIZED HEALTH CARE EDUCATION/TRAINING PROGRAM

## 2022-04-26 PROCEDURE — C9113 INJ PANTOPRAZOLE SODIUM, VIA: HCPCS | Performed by: STUDENT IN AN ORGANIZED HEALTH CARE EDUCATION/TRAINING PROGRAM

## 2022-04-26 PROCEDURE — 85025 COMPLETE CBC W/AUTO DIFF WBC: CPT

## 2022-04-26 PROCEDURE — 82330 ASSAY OF CALCIUM: CPT

## 2022-04-26 PROCEDURE — 80307 DRUG TEST PRSMV CHEM ANLYZR: CPT

## 2022-04-26 PROCEDURE — 99285 EMERGENCY DEPT VISIT HI MDM: CPT

## 2022-04-26 PROCEDURE — 96367 TX/PROPH/DG ADDL SEQ IV INF: CPT

## 2022-04-26 PROCEDURE — 85730 THROMBOPLASTIN TIME PARTIAL: CPT

## 2022-04-26 PROCEDURE — 96365 THER/PROPH/DIAG IV INF INIT: CPT

## 2022-04-26 PROCEDURE — 2500000003 HC RX 250 WO HCPCS: Performed by: STUDENT IN AN ORGANIZED HEALTH CARE EDUCATION/TRAINING PROGRAM

## 2022-04-26 PROCEDURE — 82077 ASSAY SPEC XCP UR&BREATH IA: CPT

## 2022-04-26 PROCEDURE — 6360000002 HC RX W HCPCS: Performed by: STUDENT IN AN ORGANIZED HEALTH CARE EDUCATION/TRAINING PROGRAM

## 2022-04-26 PROCEDURE — 74177 CT ABD & PELVIS W/CONTRAST: CPT

## 2022-04-26 PROCEDURE — 93010 ELECTROCARDIOGRAM REPORT: CPT | Performed by: INTERNAL MEDICINE

## 2022-04-26 PROCEDURE — 82435 ASSAY OF BLOOD CHLORIDE: CPT

## 2022-04-26 PROCEDURE — 93005 ELECTROCARDIOGRAM TRACING: CPT | Performed by: STUDENT IN AN ORGANIZED HEALTH CARE EDUCATION/TRAINING PROGRAM

## 2022-04-26 PROCEDURE — 82803 BLOOD GASES ANY COMBINATION: CPT

## 2022-04-26 PROCEDURE — 80053 COMPREHEN METABOLIC PANEL: CPT

## 2022-04-26 PROCEDURE — 84295 ASSAY OF SERUM SODIUM: CPT

## 2022-04-26 PROCEDURE — 96375 TX/PRO/DX INJ NEW DRUG ADDON: CPT

## 2022-04-26 PROCEDURE — 36415 COLL VENOUS BLD VENIPUNCTURE: CPT

## 2022-04-26 PROCEDURE — 84132 ASSAY OF SERUM POTASSIUM: CPT

## 2022-04-26 PROCEDURE — 83735 ASSAY OF MAGNESIUM: CPT

## 2022-04-26 PROCEDURE — 2580000003 HC RX 258: Performed by: STUDENT IN AN ORGANIZED HEALTH CARE EDUCATION/TRAINING PROGRAM

## 2022-04-26 PROCEDURE — 81003 URINALYSIS AUTO W/O SCOPE: CPT

## 2022-04-26 RX ORDER — SUCRALFATE 1 G/1
1 TABLET ORAL 4 TIMES DAILY
Qty: 120 TABLET | Refills: 0 | Status: SHIPPED | OUTPATIENT
Start: 2022-04-26

## 2022-04-26 RX ORDER — ONDANSETRON 2 MG/ML
4 INJECTION INTRAMUSCULAR; INTRAVENOUS
Status: COMPLETED | OUTPATIENT
Start: 2022-04-26 | End: 2022-04-26

## 2022-04-26 RX ORDER — SODIUM CHLORIDE 0.9 % (FLUSH) 0.9 %
10 SYRINGE (ML) INJECTION 2 TIMES DAILY
Status: DISCONTINUED | OUTPATIENT
Start: 2022-04-26 | End: 2022-04-26 | Stop reason: HOSPADM

## 2022-04-26 RX ORDER — ONDANSETRON 4 MG/1
4 TABLET, ORALLY DISINTEGRATING ORAL EVERY 8 HOURS PRN
Qty: 7 TABLET | Refills: 0 | Status: SHIPPED | OUTPATIENT
Start: 2022-04-26

## 2022-04-26 RX ORDER — PANTOPRAZOLE SODIUM 40 MG/1
40 TABLET, DELAYED RELEASE ORAL
Qty: 60 TABLET | Refills: 0 | Status: SHIPPED | OUTPATIENT
Start: 2022-04-26 | End: 2022-05-26

## 2022-04-26 RX ORDER — DICYCLOMINE HYDROCHLORIDE 10 MG/1
10 CAPSULE ORAL EVERY 6 HOURS PRN
Qty: 20 CAPSULE | Refills: 0 | Status: SHIPPED | OUTPATIENT
Start: 2022-04-26

## 2022-04-26 RX ADMIN — IOPAMIDOL 100 ML: 612 INJECTION, SOLUTION INTRAVENOUS at 10:50

## 2022-04-26 RX ADMIN — ONDANSETRON 4 MG: 2 INJECTION INTRAMUSCULAR; INTRAVENOUS at 10:54

## 2022-04-26 RX ADMIN — SODIUM CHLORIDE 80 MG: 9 INJECTION, SOLUTION INTRAVENOUS at 10:29

## 2022-04-26 RX ADMIN — LIDOCAINE HYDROCHLORIDE 72.6 MG: 20 INJECTION INTRAVENOUS at 10:54

## 2022-04-26 RX ADMIN — THIAMINE HYDROCHLORIDE: 100 INJECTION, SOLUTION INTRAMUSCULAR; INTRAVENOUS at 10:54

## 2022-04-26 ASSESSMENT — PAIN - FUNCTIONAL ASSESSMENT: PAIN_FUNCTIONAL_ASSESSMENT: NONE - DENIES PAIN

## 2022-04-26 ASSESSMENT — ENCOUNTER SYMPTOMS
CHEST TIGHTNESS: 0
SINUS PRESSURE: 0
SHORTNESS OF BREATH: 0
VOMITING: 1
BACK PAIN: 0
COUGH: 0
NAUSEA: 1
ABDOMINAL PAIN: 1
TROUBLE SWALLOWING: 0
DIARRHEA: 0

## 2022-04-26 NOTE — ED PROVIDER NOTES
[address] [facility] ED  eMERGENCY dEPARTMENT eNCOUnter      Pt Name: Rolanda Addison  MRN: [id number]  [place] 1975  Date of evaluation: 4/26/2022  Provider: Fe Tobin, [address] [place] [address]       Chief Complaint   Patient presents with    Hematemesis     3 hours ago x once. Tips of fingers feel numb bilat x 15 min         HISTORY OF PRESENT ILLNESS   (Location/Symptom, Timing/Onset,Context/Setting, Quality, Duration, Modifying Factors, Severity)  Note limiting factors. Rolanda Addison is a 49 y.o. male who presents to the emergency department complaint of vomiting what looks like coffee grinds that started 3 hours ago. Patient states in the ER he became extremely nervous and was breathing quickly. Shortly after that his fingertips began to feel numb. The ER physician explained to the patient that he is breathing quickly and needs to calm down otherwise he is going to blow off his carbon dioxide in his hands not only will be numb but they will cramp. Patient states he is worried that he may have a ruptured spleen again. 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 ER physician explained to the patient once he fell and got hurt somehow that his spleen would not rupture again. The patient states that is \"good. \"  When asked the ED physician if he was \"sure? \"    The patient did as instructed and his symptoms improved. The patient also states that he has bilateral leg numbness. The ER physician asked further about this and states it has been before more than 3 years and he used to take Neurontin. ER physician explained that nicotine and alcohol use can lead to neuropathy and that there is nothing that we are going to explore about this today other than checking the lab work. He will need to follow-up with his primary care physician for this.   The patient states that his abdominal pain is stabbing and severe and started with the vomiting. Upon further questioning the patient states he drinks alcohol only 1 maybe 2 times a month. The patient smells of alcohol but denies any recent alcohol consumption. The history is provided by the patient. NursingNotes were reviewed. REVIEW OF SYSTEMS    (2-9 systems for level 4, 10 or more for level 5)     Review of Systems   Constitutional: Negative for activity change, appetite change, chills, fever and unexpected weight change. HENT: Negative for drooling, ear pain, nosebleeds, sinus pressure and trouble swallowing. Respiratory: Negative for cough, chest tightness and shortness of breath. Cardiovascular: Negative for chest pain and leg swelling. Gastrointestinal: Positive for abdominal pain, nausea and vomiting. Negative for diarrhea. Endocrine: Negative for polydipsia and polyphagia. Genitourinary: Negative for dysuria, flank pain and frequency. Musculoskeletal: Negative for back pain and myalgias. Skin: Negative for pallor and rash. Neurological: Negative for syncope, weakness and headaches. Hematological: Does not bruise/bleed easily. Psychiatric/Behavioral: The patient is nervous/anxious. All other systems reviewed and are negative. Except as noted above the remainder of the review of systems was reviewed and negative. PAST MEDICAL HISTORY     Past Medical History:   Diagnosis Date    Cerebrovascular accident (CVA) ([facility] [address]     Hepatitis C     Hyperlipidemia 12/23/2017         SURGICALHISTORY       Past Surgical History:   Procedure Laterality Date    ABDOMEN SURGERY      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         CURRENT MEDICATIONS       Previous Medications    ALBUTEROL SULFATE HFA (PROVENTIL HFA) 108 (90 BASE) MCG/ACT INHALER    Inhale 1-2 puffs into the lungs every 4 hours as needed for Wheezing or Shortness of Breath (Space out to every 6 hours as symptoms improve) Space out to every 6 hours as symptoms improve. ASPIRIN 81 MG CHEWABLE TABLET    Take 1 tablet by mouth daily    ATORVASTATIN (LIPITOR) 80 MG TABLET    Take 1 tablet by mouth nightly       ALLERGIES     Patient has no known allergies. FAMILY HISTORY     History reviewed. No pertinent family history. SOCIAL HISTORY       Social History     Socioeconomic History    Marital status:      Spouse name: None    Number of children: None    Years of education: None    Highest education level: None   Occupational History    None   Tobacco Use    Smoking status: Current Every Day Smoker     Packs/day: 0.50     Types: Cigarettes    Smokeless tobacco: Never Used   Vaping Use    Vaping Use: Never used   Substance and Sexual Activity    Alcohol use: Yes     Comment: Socially    Drug use: Yes     Types: Cocaine, Marijuana (Weed)     Comment: Last used cocaine 4/25/22    Sexual activity: Not Currently   Other Topics Concern    None   Social History Narrative    None     Social Determinants of Health     Financial Resource Strain:     Difficulty of Paying Living Expenses: Not on file   Food Insecurity:     Worried About Running Out of Food in the Last Year: Not on file    Cabrera of Food in the Last Year: Not on file   Transportation Needs:     Lack of Transportation (Medical): Not on file    Lack of Transportation (Non-Medical):  Not on file   Physical Activity:     Days of Exercise per Week: Not on file    Minutes of Exercise per Session: Not on file   Stress:     Feeling of Stress : Not on file   Social Connections:     Frequency of Communication with Friends and Family: Not on file    Frequency of Social Gatherings with Friends and Family: Not on file    Attends Advent Services: Not on file    Active Member of Clubs or Organizations: Not on file    Attends Club or Organization Meetings: Not on file    Marital Status: Not on file   Intimate Partner Violence:     Fear of Current or Ex-Partner: Not on file    Emotionally Abused: Not gallop. Pulmonary:      Effort: Pulmonary effort is normal. Tachypnea present. No accessory muscle usage, prolonged expiration or respiratory distress. Breath sounds: Normal breath sounds and air entry. No stridor. No decreased breath sounds, wheezing, rhonchi or rales. Chest:      Chest wall: No tenderness. Abdominal:      General: Abdomen is flat. Bowel sounds are normal. There is no distension or abdominal bruit. Palpations: Abdomen is soft. There is no shifting dullness, fluid wave, hepatomegaly, splenomegaly, mass or pulsatile mass. Tenderness: There is abdominal tenderness in the epigastric area and left upper quadrant. There is no right CVA tenderness, left CVA tenderness, guarding or rebound. Negative signs include Rodriguez's sign and McBurney's sign. Hernia: No hernia is present. Musculoskeletal:         General: No swelling, tenderness, deformity or signs of injury. Normal range of motion. Cervical back: Normal range of motion and neck supple. No rigidity. Lymphadenopathy:      Head:      Right side of head: No submental adenopathy. Left side of head: No submental adenopathy. Skin:     General: Skin is warm and dry. Capillary Refill: Capillary refill takes less than 2 seconds. Coloration: Skin is not jaundiced or pale. Findings: No bruising, erythema, lesion or rash. Neurological:      General: No focal deficit present. Mental Status: He is alert and oriented to person, place, and time. Mental status is at baseline. Cranial Nerves: No cranial nerve deficit. Sensory: No sensory deficit. Motor: No weakness. Coordination: Coordination normal.      Deep Tendon Reflexes: Reflexes are normal and symmetric. Psychiatric:         Mood and Affect: Mood normal.         Behavior: Behavior normal. Behavior is cooperative. Thought Content:  Thought content normal.         Judgment: Judgment normal.         DIAGNOSTIC RESULTS EKG: All EKG's are interpreted by the Emergency Department Physician who either signs or Co-signsthis chart in the absence of a cardiologist.    EKG: Sinus rhythm 85 bpm.  Normal axis. Normal ST segments. QT intervals 388 ms. There are no PVCs. RADIOLOGY:   Non-plain filmimages such as CT, Ultrasound and MRI are read by the radiologist. Plain radiographic images are visualized and preliminarily interpreted by the emergency physician with the below findings:        Interpretation per the Radiologist below, if available at the time ofthis note:    CT ABDOMEN PELVIS W IV CONTRAST Additional Contrast? None   Final Result      No acute process in the abdomen/pelvis.                      ==========               ED BEDSIDE ULTRASOUND:   Performed by ED Physician - none    LABS:  Labs Reviewed   CBC WITH AUTO DIFFERENTIAL - Abnormal; Notable for the following components:       Result Value    WBC 13.4 (*)     Hemoglobin 18.3 (*)     Hematocrit 53.8 (*)     MCH 31.9 (*)     Neutrophils Absolute 9.6 (*)     All other components within normal limits   COMPREHENSIVE METABOLIC PANEL - Abnormal; Notable for the following components:    Glucose 115 (*)     Calcium 10.1 (*)     Total Protein 9.1 (*)     Albumin 5.3 (*)     Alkaline Phosphatase 105 (*)     ALT 42 (*)     Globulin 3.8 (*)     All other components within normal limits   CK - Abnormal; Notable for the following components: Total  (*)     All other components within normal limits   URINE DRUG SCREEN - Abnormal; Notable for the following components:    Cocaine Metabolite Screen, Urine POSITIVE (*)     All other components within normal limits   LACTIC ACID - Abnormal; Notable for the following components:    Lactic Acid 2.3 (*)     All other components within normal limits    Narrative:     ORDER WAS CANCELLED 04/26/2022 10:04, added to #[id number].    POCT VENOUS - Abnormal; Notable for the following components:    POC Glucose 112 (*)     pH, Asa 7.309 (*)     pCO2, Asa 52.2 (*)     Lactate 2.49 (*)     Hemoglobin 20.0 (*)     POC Hematocrit 59 (*)     All other components within normal limits   PROTIME-INR   APTT   LIPASE   URINALYSIS WITH REFLEX TO CULTURE   ETHANOL    Narrative:     ORDER WAS CANCELLED 04/26/2022 10:04, added to #[id number]. MAGNESIUM   POCT EPOC BLOOD GAS, LACTIC ACID, ICA       All other labs were within normal range or not returned as of this dictation. EMERGENCY DEPARTMENT COURSE and DIFFERENTIAL DIAGNOSIS/MDM:   Vitals:    Vitals:    04/26/22 0932 04/26/22 1020 04/26/22 1130   BP: 128/87 121/76 120/75   Pulse: 99 92 85   Resp: 18 20 18   Temp: 97.7 °F (36.5 °C)     TempSrc: Oral     SpO2: 95% 94% 92%   Weight: 160 lb (72.6 kg)     Height: 5' 9\" (1.753 m)             MDM  The patient has an upper GI bleed due to alcohol use. The patient is intoxicated today with alcohol. IV banana bag was ordered for the patient. The patient has chronic neuropathy bilateral lower extremities that is not new and has not followed up with his primary care physician, and additionally he quit taking Neurontin for this. The patient has epigastric and left upper quadrant abdominal pain most likely due to a stomach ulcer or gastritis with bleeding. A CAT scan of the abdomen pelvis will be obtained to determine if the patient has pancreatitis, or bowel perforation. The patient is not distended. The patient was ordered IV Protonix 80 mg bolus. Triggers for foods and beverages that can cause bleeding of the stomach including alcohol, carbonated beverages, caffeine, citrus, peppermint, chocolate were discussed with the patient and his sister. The patient retorted \"dude, I am [place] what you think I eat. \"  The ER physician explained to the patient that regardless of whether or not he has [place] that these foods can irritate and thinned down the stomach lining.   The patient denies any NSAID use (which the ED attending also explained can thin the stomach lining). Patient has longstanding history of drug addiction. [facility] trauma note referenced not giving him opiates due to prior history of addiction. The patient was given IV Protonix as well as IV lidocaine for visceral pain. The patient is currently intoxicated and the ER attending does not want to give him stronger pain medications out of concern that he could overdose with the concommitment alcohol intoxication. Patient states that he is feeling better on reexamination. The ER physician explained to the patient if he keeps using cocaine they can cause him to have a debilitating stroke, a heart attack that may disable or kill him, or even cause priapism where he will have a painful erection that does not go away that might have to even be drained with needles. There is garnered the patient's attention. He states he does not want those things to happen to him. Additionally the ER physician explained the patient that he has to change the things that he consumes and drinks or it will continue to thin his stomach lining and cause worsening ulcer possible rupture eventually lead him to be disabled in a nursing home for the rest of his life unable to eat. The findings were discussed with the patient. The patient was invited to return  to the ER if worse symptoms. The patient verbalized understanding of the care and they have no further questions. CONSULTS:  IP CONSULT TO PHARMACY    PROCEDURES:  Unless otherwise noted below, none     Procedures    FINAL IMPRESSION      1. Acute alcoholic gastritis with hemorrhage    2. Cocaine abuse ([address]    3. Acute alcoholic intoxication with complication (HCC)    4. Abdominal pain, left upper quadrant    5. Tobacco dependence    6. Hyperventilation    7.  Mild dehydration          DISPOSITION/PLAN   DISPOSITION        PATIENT REFERRED TO:  Lurdes Maurer MD  [address] [place] [address]  [phone]    Call today  To arrange a follow up visit.      DISCHARGE MEDICATIONS:  New Prescriptions    DICYCLOMINE (BENTYL) 10 MG CAPSULE    Take 1 capsule by mouth every 6 hours as needed (cramps)    ONDANSETRON (ZOFRAN ODT) 4 MG DISINTEGRATING TABLET    Take 1 tablet by mouth every 8 hours as needed for Nausea    PANTOPRAZOLE (PROTONIX) 40 MG TABLET    Take 1 tablet by mouth 2 times daily (before meals)    SUCRALFATE (CARAFATE) 1 GM TABLET    Take 1 tablet by mouth 4 times daily          (Please note that portions of this note were completed with a voice recognition program.  Efforts were made to edit the dictations but occasionally words are mis-transcribed.)    Jaimie Bravo DO (electronically signed)  Attending Emergency Physician         Jaimie Bravo DO  04/26/22 6338

## 2022-04-26 NOTE — ED NOTES
Patient resting in bed with call light in reach. Breathes are even and unlabored. Skin is warm and dry. Vital signs are stable. Patient remains on bedside monitor. Patient denies any needs at this time.       Dhiraj De La Rosa RN  04/26/22 0522

## 2022-04-26 NOTE — ED NOTES
Urine sample obtained after triage. Amb to room 17 after that.      Fede Castellanos RN  04/26/22 6763

## 2022-04-26 NOTE — ED NOTES
Patient given discharge instructions and prescription and verbalized understanding. Vital signs stable. Resp even and unlabored. Skin warm, dry and intact. Patient is alert and oriented. IV removed. Patient doesn't have any questions at this time. Patients sister at bedside to transport him home.       Gopi Russ RN  04/26/22 1647

## 2022-10-15 ENCOUNTER — HOSPITAL ENCOUNTER (EMERGENCY)
Age: 47
Discharge: HOME | End: 2022-10-15
Payer: COMMERCIAL

## 2022-10-15 ENCOUNTER — APPOINTMENT (OUTPATIENT)
Dept: GENERAL RADIOLOGY | Age: 47
End: 2022-10-15
Payer: COMMERCIAL

## 2022-10-15 VITALS
WEIGHT: 150 LBS | OXYGEN SATURATION: 98 % | SYSTOLIC BLOOD PRESSURE: 155 MMHG | RESPIRATION RATE: 18 BRPM | DIASTOLIC BLOOD PRESSURE: 100 MMHG | BODY MASS INDEX: 22.15 KG/M2 | HEART RATE: 107 BPM | TEMPERATURE: 98 F

## 2022-10-15 DIAGNOSIS — M77.8 TENDINITIS OF HAND: Primary | ICD-10-CM

## 2022-10-15 PROCEDURE — 96372 THER/PROPH/DIAG INJ SC/IM: CPT

## 2022-10-15 PROCEDURE — 6370000000 HC RX 637 (ALT 250 FOR IP): Performed by: PHYSICIAN ASSISTANT

## 2022-10-15 PROCEDURE — 73130 X-RAY EXAM OF HAND: CPT

## 2022-10-15 PROCEDURE — 6360000002 HC RX W HCPCS: Performed by: PHYSICIAN ASSISTANT

## 2022-10-15 PROCEDURE — 99284 EMERGENCY DEPT VISIT MOD MDM: CPT

## 2022-10-15 RX ORDER — METHYLPREDNISOLONE 4 MG/1
TABLET ORAL
Qty: 21 TABLET | Refills: 0 | Status: SHIPPED | OUTPATIENT
Start: 2022-10-15 | End: 2022-10-21

## 2022-10-15 RX ORDER — HYDROCODONE BITARTRATE AND ACETAMINOPHEN 5; 325 MG/1; MG/1
1 TABLET ORAL ONCE
Status: COMPLETED | OUTPATIENT
Start: 2022-10-15 | End: 2022-10-15

## 2022-10-15 RX ORDER — KETOROLAC TROMETHAMINE 30 MG/ML
60 INJECTION, SOLUTION INTRAMUSCULAR; INTRAVENOUS ONCE
Status: COMPLETED | OUTPATIENT
Start: 2022-10-15 | End: 2022-10-15

## 2022-10-15 RX ORDER — HYDROCODONE BITARTRATE AND ACETAMINOPHEN 5; 325 MG/1; MG/1
1 TABLET ORAL EVERY 6 HOURS PRN
Qty: 10 TABLET | Refills: 0 | Status: SHIPPED | OUTPATIENT
Start: 2022-10-15 | End: 2022-10-18

## 2022-10-15 RX ADMIN — HYDROCODONE BITARTRATE AND ACETAMINOPHEN 1 TABLET: 5; 325 TABLET ORAL at 01:25

## 2022-10-15 RX ADMIN — KETOROLAC TROMETHAMINE 60 MG: 30 INJECTION, SOLUTION INTRAMUSCULAR at 00:56

## 2022-10-15 ASSESSMENT — ENCOUNTER SYMPTOMS
SHORTNESS OF BREATH: 0
EYE PAIN: 0
APNEA: 0
COLOR CHANGE: 0
ALLERGIC/IMMUNOLOGIC NEGATIVE: 1
ABDOMINAL PAIN: 0
TROUBLE SWALLOWING: 0

## 2022-10-15 NOTE — Clinical Note
Crystal Boston was seen and treated in our emergency department on 10/15/2022. He may return to work on 10/18/2022. If you have any questions or concerns, please don't hesitate to call.       Jose Munoz PA-C

## 2022-10-15 NOTE — Clinical Note
Brenda Reyes was seen and treated in our emergency department on 10/15/2022. He may return to work on 10/18/2022. If you have any questions or concerns, please don't hesitate to call.       Rj Mane PA-C

## 2022-10-15 NOTE — ED PROVIDER NOTES
[address] [facility] ED  eMERGENCYdEPARTMENT eNCOUnter      Pt Name: Shelley Kang  MRN: [id number]  [place] 1975  Date of evaluation: 10/15/2022  Michael Dye PA-C    CHIEF COMPLAINT       Chief Complaint   Patient presents with    Hand Pain     X1week. Bilateral hand pain         HISTORY OF PRESENT ILLNESS  (Location/Symptom, Timing/Onset, Context/Setting, Quality, Duration, Modifying Factors, Severity.)   Shelley Kang is a 49 y.o. male who presents to the emergency department with complaints of bilateral hand pain at the bases of the thumb with intermittent tingling sensation. 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 Patient also states that he feels like his hands are breaking out into rashes though he denies any direct exposure to chemicals. HPI    Nursing Notes were reviewed and I agree. REVIEW OF SYSTEMS    (2-9 systems for level 4, 10 or more for level 5)     Review of Systems   Constitutional:  Negative for diaphoresis and fever. HENT:  Negative for hearing loss and trouble swallowing. Eyes:  Negative for pain. Respiratory:  Negative for apnea and shortness of breath. Cardiovascular:  Negative for chest pain. Gastrointestinal:  Negative for abdominal pain. Endocrine: Negative. Genitourinary:  Negative for hematuria. Musculoskeletal:  Positive for arthralgias. Negative for neck pain and neck stiffness. Skin:  Negative for color change. Allergic/Immunologic: Negative. Neurological:  Negative for dizziness and numbness. Hematological: Negative. Psychiatric/Behavioral: Negative. All other systems reviewed and are negative. Except as noted above the remainder of the review of systems was reviewed and negative.        PAST MEDICAL HISTORY     Past Medical History:   Diagnosis Date    Cerebrovascular accident (CVA) ([facility] [address]     Hepatitis C     Hyperlipidemia 12/23/2017         SURGICAL HISTORY Past Surgical History:   Procedure Laterality Date    ABDOMINAL SURGERY      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         CURRENT MEDICATIONS       Discharge Medication List as of 10/15/2022  1:21 AM        CONTINUE these medications which have NOT CHANGED    Details   pantoprazole (PROTONIX) 40 MG tablet Take 1 tablet by mouth 2 times daily (before meals), Disp-60 tablet, R-0Print      sucralfate (CARAFATE) 1 GM tablet Take 1 tablet by mouth 4 times daily, Disp-120 tablet, R-0Print      ondansetron (ZOFRAN ODT) 4 MG disintegrating tablet Take 1 tablet by mouth every 8 hours as needed for Nausea, Disp-7 tablet, R-0Print      dicyclomine (BENTYL) 10 MG capsule Take 1 capsule by mouth every 6 hours as needed (cramps), Disp-20 capsule, R-0Print      albuterol sulfate HFA (PROVENTIL HFA) 108 (90 Base) MCG/ACT inhaler Inhale 1-2 puffs into the lungs every 4 hours as needed for Wheezing or Shortness of Breath (Space out to every 6 hours as symptoms improve) Space out to every 6 hours as symptoms improve., Disp-1 Inhaler, R-0Print      aspirin 81 MG chewable tablet Take 1 tablet by mouth daily, Disp-30 tablet, R-0Print      atorvastatin (LIPITOR) 80 MG tablet Take 1 tablet by mouth nightly, Disp-30 tablet, R-0Print             ALLERGIES     Patient has no known allergies. FAMILY HISTORY     No family history on file.        SOCIAL HISTORY       Social History     Socioeconomic History    Marital status:    Tobacco Use    Smoking status: Every Day     Packs/day: 0.50     Types: Cigarettes    Smokeless tobacco: Never   Vaping Use    Vaping Use: Never used   Substance and Sexual Activity    Alcohol use: Yes     Comment: Socially    Drug use: Yes     Types: Cocaine, Marijuana (Weed)     Comment: Last used cocaine 4/25/22    Sexual activity: Not Currently       SCREENINGS    [place] Coma Scale  Eye Opening: Spontaneous  Best Verbal Response: Oriented  Best Motor Response: Obeys commands  [place] Coma Scale Score: 15      PHYSICAL EXAM    (up to 7 forlevel 4, 8 or more for level 5)     ED Triage Vitals   BP Temp Temp Source Heart Rate Resp SpO2 Height Weight   10/15/22 0016 10/15/22 0014 10/15/22 0014 10/15/22 0014 10/15/22 0014 10/15/22 0014 -- 10/15/22 0014   (!) 155/100 98 °F (36.7 °C) Oral (!) 107 18 98 %  150 lb (68 kg)       Physical Exam  Vitals and nursing note reviewed. Constitutional:       General: He is not in acute distress. Appearance: He is well-developed. He is not diaphoretic. HENT:      Head: Normocephalic and atraumatic. Mouth/Throat:      Mouth: Mucous membranes are moist.      Pharynx: No oropharyngeal exudate. Eyes:      General: No scleral icterus. Conjunctiva/sclera: Conjunctivae normal.      Pupils: Pupils are equal, round, and reactive to light. Neck:      Trachea: No tracheal deviation. Cardiovascular:      Rate and Rhythm: Normal rate. Heart sounds: Normal heart sounds. Pulmonary:      Effort: Pulmonary effort is normal. No respiratory distress. Breath sounds: Normal breath sounds. Abdominal:      General: Bowel sounds are normal. There is no distension. Palpations: Abdomen is soft. Musculoskeletal:         General: Normal range of motion. Hands:       Cervical back: Normal range of motion and neck supple. No rigidity or tenderness. Skin:     General: Skin is warm and dry. Findings: No erythema or rash. Neurological:      Mental Status: He is alert and oriented to person, place, and time. Cranial Nerves: No cranial nerve deficit. Motor: No abnormal muscle tone. Psychiatric:         Behavior: Behavior normal.         Thought Content:  Thought content normal.         Judgment: Judgment normal.         DIAGNOSTIC RESULTS     RADIOLOGY:   Non-plain film images such as CT, Ultrasound and MRI are read by the radiologist. Plain radiographic images are visualized and preliminarilyinterpreted by Ken Richard PA-C with the below findings:    Neg Fx    Interpretation per the Radiologist below, if available at the time of this note:    XR HAND RIGHT (MIN 3 VIEWS)   Final Result   No acute disease. RECOMMENDATION:   Careful clinical correlation and follow up recommended. XR HAND LEFT (MIN 3 VIEWS)   Final Result   No acute osseous abnormality. Soft tissue swelling with suspected retained   foreign bodies in the ulnar aspect of the palm. LABS:  Labs Reviewed - No data to display    All other labs were within normal range or not returnedas of this dictation. EMERGENCYDEPARTMENT COURSE and DIFFERENTIAL DIAGNOSIS/MDM:   Vitals:    Vitals:    10/15/22 0014 10/15/22 0016   BP:  (!) 155/100   Pulse: (!) 107    Resp: 18    Temp: 98 °F (36.7 °C)    TempSrc: Oral    SpO2: 98%    Weight: 150 lb (68 kg)        REASSESSMENT        Patient presented the emergency department with bilateral hand pain along the bases of the thumbs consistent with tendinitis. X-rays unremarkable for area of pain. Patient had no tenderness or swelling noted to the ulnar aspect where the identified foreign bodies were, considering that these are possibly old, healed wounds. Patient placed in braces and referred to orthopedic for outpatient follow-up    MDM      PROCEDURES:    Procedures      FINAL IMPRESSION      1.  Tendinitis of hand          DISPOSITION/PLAN   DISPOSITION Decision To Discharge 10/15/2022 01:14:47 AM      PATIENT REFERRED TO:  Kamini Benitez [address]  [place] [address] [address]  [address] [facility]  Call in 3 days      [facility] and [facility]. [facility] [phone]  Call in 3 days      DISCHARGE MEDICATIONS:  Discharge Medication List as of 10/15/2022  1:21 AM        START taking these medications    Details   methylPREDNISolone (MEDROL, THEE,) 4 MG tablet Take by mouth., Disp-21 tablet, R-0Print      HYDROcodone-acetaminophen (NORCO) 5-325 MG per tablet Take 1 tablet by mouth every 6 hours as needed for Pain for up to 3 days. Intended supply: 3 days.  Take lowest dose possible to manage pain, Disp-10 tablet, R-0Print             (Please note that portions of this note were completed with a voice recognition program.  Efforts were made to edit the dictations but occasionally words are mis-transcribed.)    SOTERO Boothe PA-C  10/15/22 0995

## 2022-10-26 ENCOUNTER — OFFICE VISIT (OUTPATIENT)
Dept: ORTHOPEDIC SURGERY | Age: 47
End: 2022-10-26
Payer: COMMERCIAL

## 2022-10-26 DIAGNOSIS — M18.0 ARTHRITIS OF CARPOMETACARPAL (CMC) JOINT OF BOTH THUMBS: Primary | ICD-10-CM

## 2022-10-26 PROCEDURE — G8420 CALC BMI NORM PARAMETERS: HCPCS | Performed by: ORTHOPAEDIC SURGERY

## 2022-10-26 PROCEDURE — G8484 FLU IMMUNIZE NO ADMIN: HCPCS | Performed by: ORTHOPAEDIC SURGERY

## 2022-10-26 PROCEDURE — 4004F PT TOBACCO SCREEN RCVD TLK: CPT | Performed by: ORTHOPAEDIC SURGERY

## 2022-10-26 PROCEDURE — 99205 OFFICE O/P NEW HI 60 MIN: CPT | Performed by: ORTHOPAEDIC SURGERY

## 2022-10-26 PROCEDURE — G8427 DOCREV CUR MEDS BY ELIG CLIN: HCPCS | Performed by: ORTHOPAEDIC SURGERY

## 2022-10-26 RX ORDER — METHYLPREDNISOLONE 4 MG/1
TABLET ORAL
Qty: 42 TABLET | Refills: 0 | Status: SHIPPED | OUTPATIENT
Start: 2022-10-26

## 2022-10-26 NOTE — PROGRESS NOTES
Subjective:      Patient ID: Irlanda Hernandez is a 49 y.o. male who presents today for:  Chief Complaint   Patient presents with    Follow-Up from Hospital     ER follow up for Bilateral Hand pain, patient states he may have aggrevated pain in both hands from the work he does,ongoing pain and swelling for 1 year, x-rays in Epic       HPI    Presents for longstanding pain and discomfort in the left [facility]. He has been treated the clinic before with multiple with an injection that did not give him any relief. He has been in and out of the ED in this regard. The most recent visit I have reviewed those notes and the excerpt of that note is noted below. The patient has no acute trauma. The patient has tried splinting anti-inflammatories unsuccessfully. Once again he states he had a steroid injection in the clinic which was unsuccessful. He is right-hand dominant but he says is ambidextrous. Unfortunately his job duties involve using his hands repetitively pretty impressively. Patient has had x-rays of his left hand and right hand on [address] demonstrate an impressive amount of CMC joint arthritis. Films have been reviewed independently. Pt Name: Irlanda Hernandez  MRN: [id number]  [place] 1975  Date of evaluation: 10/15/2022  Jag Odell PA-C     CHIEF COMPLAINT             Chief Complaint   Patient presents with    Hand Pain       X1week. Bilateral hand pain            HISTORY OF PRESENT ILLNESS  (Location/Symptom, Timing/Onset, Context/Setting, Quality, Duration, Modifying Factors, Severity.)   Irlanda Hernandez is a 49 y.o. male who presents to the emergency department with complaints of bilateral hand pain at the bases of the thumb with intermittent tingling sensation. 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   Patient also states that he feels like his hands are breaking out into rashes though he denies any direct exposure to chemicals. HPI     Nursing Notes were reviewed and I agree. REVIEW OF SYSTEMS    (2-9 systems for level 4, 10 or more for level 5)      Review of Systems   Constitutional:  Negative for diaphoresis and fever. HENT:  Negative for hearing loss and trouble swallowing. Eyes:  Negative for pain. Respiratory:  Negative for apnea and shortness of breath. Cardiovascular:  Negative for chest pain. Gastrointestinal:  Negative for abdominal pain. Endocrine: Negative. Genitourinary:  Negative for hematuria. Musculoskeletal:  Positive for arthralgias. Negative for neck pain and neck stiffness. Skin:  Negative for color change. Allergic/Immunologic: Negative. Neurological:  Negative for dizziness and numbness. Hematological: Negative. Psychiatric/Behavioral: Negative. All other systems reviewed and are negative.     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     Social History     Socioeconomic History    Marital status:      Spouse name: Not on file    Number of children: Not on file    Years of education: Not on file    Highest education level: Not on file   Occupational History    Not on file   Tobacco Use    Smoking status: Every Day     Packs/day: 0.50     Types: Cigarettes    Smokeless tobacco: Never   Vaping Use    Vaping Use: Never used   Substance and Sexual Activity    Alcohol use: Yes     Comment: Socially    Drug use: Yes     Types: Cocaine, Marijuana (Weed)     Comment: Last used cocaine 4/25/22    Sexual activity: Not Currently   Other Topics Concern    Not on file   Social History Narrative    Not on file     Social Determinants of Health     Financial Resource Strain: Not on file   Food Insecurity: Not on file Transportation Needs: Not on file   Physical Activity: Not on file   Stress: Not on file   Social Connections: Not on file   Intimate Partner Violence: Not on file   Housing Stability: Not on file     No family history on file. No Known Allergies  Current Outpatient Medications on File Prior to Visit   Medication Sig Dispense Refill    pantoprazole (PROTONIX) 40 MG tablet Take 1 tablet by mouth 2 times daily (before meals) 60 tablet 0    sucralfate (CARAFATE) 1 GM tablet Take 1 tablet by mouth 4 times daily (Patient not taking: Reported on 10/26/2022) 120 tablet 0    ondansetron (ZOFRAN ODT) 4 MG disintegrating tablet Take 1 tablet by mouth every 8 hours as needed for Nausea (Patient not taking: Reported on 10/26/2022) 7 tablet 0    dicyclomine (BENTYL) 10 MG capsule Take 1 capsule by mouth every 6 hours as needed (cramps) (Patient not taking: Reported on 10/26/2022) 20 capsule 0    albuterol sulfate HFA (PROVENTIL HFA) 108 (90 Base) MCG/ACT inhaler Inhale 1-2 puffs into the lungs every 4 hours as needed for Wheezing or Shortness of Breath (Space out to every 6 hours as symptoms improve) Space out to every 6 hours as symptoms improve. (Patient not taking: Reported on 10/26/2022) 1 Inhaler 0    aspirin 81 MG chewable tablet Take 1 tablet by mouth daily (Patient not taking: Reported on 10/26/2022) 30 tablet 0    atorvastatin (LIPITOR) 80 MG tablet Take 1 tablet by mouth nightly (Patient not taking: Reported on 10/26/2022) 30 tablet 0     No current facility-administered medications on file prior to visit. Review of Systems  Has been reviewed no change from the ED visit. xrays were reviewed of the with the patient specifically in addition to the x-rays 3 views the left hand right hand. I specifically discussed with him what bone would be taken out surgically he does have a loose body in that area as well which probably does not help the arthritis is impressive. Objective:    There were no vitals taken for this visit. ORTHOEXAM    She has significant CMC arthritis bilaterally the left side is quite pronounced he has tattoos up his forearm. He has pain both dorsally and volarly has positive grind test unfortunately has numbness and tingling the main issue Vusion consistent likely with carpal tunnel he has arthritides along his PIP joints and MCP joints as well to a lesser degree versus CMC joint. The left side bothers him more than the right. Assessment:       Diagnosis Orders   1. Arthritis of carpometacarpal [place]) joint of both thumbs              Plan:   Discussed the gamut of treatment with him and his wife. This includes nonoperative treatment protocols like anti-inflammatories repeat injections anti-inflammatories steroids by mouth. Other options include splinting. The last option of which is operative and that would be an operative reconstruction. The patient does have a palmaris longus and so I probably prefer at his age to do a palmaris longus interposition. The patient is surgeries been discussed in detail the risks and benefits of the procedure the immobilization rehab and how it affects his work. The complexity of the procedure were discussed as well. At this time the patient wants to get a steroid pack and then get on the schedule. He wants to forego repeat injection as he stated he had 1 unsuccessfully. I discussed with him that the [facility] procedure would not benefit his carpal tunnel whatsoever or any others arthritides or complaints. He has to understand that ahead of time is clearly has more issues in the [facility] alone. Of also discussed with him potential obtain an EMG nerve conduction study so that we can review those results before proceeding surgically so we can do a concomitant carpal tunnel release if confirmed. This time he does not wish to pursue that and therefore giving him a steroid pack for some relief and we can initiate surgery in 4 to 6 weeks.   Preadmission testing be required. Prescription has been sent to his outside pharmacy for steroid pack. No orders of the defined types were placed in this encounter. No orders of the defined types were placed in this encounter. No follow-ups on file. Marie Clark MD       Surgery Phone: [phone]   [facility]   Surgery Fax: [phone]    Phone: [phone]          Fax: [phone]    Orthopedics: Surgery Scheduling, PAT & PRE-OP Order Form  Call to advance [place] at [phone] at least 24 hours prior to date of service     Surgery Location: [facility] Surgery: [address] [place] [address]  OFFICE   Surgeon's Jorge Velasquez MD Surgery Date:   Time: tf   Patient's Name: Kraig Mccollum : 1975    Gender: male   Home Phone:  [phone] Cell Phone: [phone]    SS#:  [id number]  Emergency Contact:  [address]   Phone: [phone]  Payor: Tai Person /  /  /    ID No.: [id number]      PROVIDER TO COMPLETE:  Diagnosis: Left Aia 16 joint arthritis  Procedure/Consent: Aia 16 joint arthroplasty with palmaris longus tendon interposition, transfer  ICD-10 Code: _______________________  CPT Codes: 38118, 26628  Case Comments/Implants: N/A   Surgery Scheduled as: Outpatient  Anesthesia Requested: General  Referring Family Doctor: Kiera ROSA  [x] [facility] Date/Time:                                                            [x] History & Physical [] Physician will Provide [] Attached [] Dictated [] Other  [x] Follow Anesthesia Pre-Op Orders for X-rays, [facility]     [x] SN & PT to evaluate and treat/educate disease management, medications, home safety & equipment needs for total joint patients  [] Other: ____________________________________________________  Consults: Medical/Cardiac Clearance done by  ____________________  PRE-OP ORDERS:   Allergies: Patient has no known allergies.  Latex Allergies:             Diabetic:           [] IV ________________________  [x] IV Start with J-loop     Preprinted Orders: Attached [] Yes [] No   ANTIBIOTIC PRE-OP: [x] ANCEF 2 gram IVPB if > 120 kg 3 grams IVPB within 1 hour of incision, if ALLERGIC, use VANCOMYCIN 1 gram IV, 2 hours pre-op  [] TXA Protocol [] Other:   [x] NPO   [] Betablocker (if needed) _____________________________________   [] Knee high anti-embolic hose [] Thigh high anti-embolic hose   Other: ______________________________________________________    Physician Signature Required: Electronically signed by Megan Aguilar MD on 10/26/2022 at 1:51 PM  Date/Time: 10/26/2022

## 2022-11-11 ENCOUNTER — TELEPHONE (OUTPATIENT)
Dept: ORTHOPEDIC SURGERY | Age: 47
End: 2022-11-11

## 2022-11-11 NOTE — TELEPHONE ENCOUNTER
Surgery Scheduling and Authorization Information    Surgery Date:11/23/2022  Surgery good through dates: 11/23/22-2/23-23  CPT Code(s) 33434,30195  Procedure(s) (L) cmc joint arthroplasty w/ palmaris longus tendon      Approved [x] Not Required [] Denied []  Reference # [id number]  Auth #  [id number]  How authorization obtained:  [] web portal             [] via phone       [x] Via fax    Provider  [] Francoise Navas  [x] Narciso Simpson  [] Abby Jones  [] Alaina Arreola  [] Elier Sandoval  [] Yung Neal  [] Ulices Antony  [] Dandre Marshall  [] Joaquin Umanzor      Surgery Sheet Faxed to Scheduling [x]  Surgery and Prior Authorization Information Sheet Scanned [x]

## 2022-11-16 ENCOUNTER — TELEPHONE (OUTPATIENT)
Dept: ORTHOPEDIC SURGERY | Age: 47
End: 2022-11-16

## 2022-11-16 NOTE — TELEPHONE ENCOUNTER
Patient had an appointment with [facility] and sports medicine 11/16 at 2:45 in our [facility] office. Patient's appointment was a pre-op for surgery with Dr. Carrie Mitchell on 11/23. Patient was called at 2:50  by Aletha Millan. Both number in the patients notes has been disconnected.

## 2022-11-22 ENCOUNTER — TELEPHONE (OUTPATIENT)
Dept: ORTHOPEDIC SURGERY | Age: 47
End: 2022-11-22

## 2022-11-22 NOTE — TELEPHONE ENCOUNTER
Attempted to contact patient to inform that we had to cancel surgery for tomorrow 11/23/2022 with Dr. Georgiana Montes De Oca. PT did not show to pre-op apt. Surgery was contacted. LVM for paitent.

## 2022-11-23 NOTE — TELEPHONE ENCOUNTER
Patient called today to ask when his surgery was. I informed him that we tried calling his number to let him know he missed his pre-op so we would have to cancel surgery. We had the wrong phone number. I updated his phone number and sent a message to [address] [place] [address] regarding a new surgery date for patient. We will be rescheduling patient.

## 2022-12-08 ENCOUNTER — TELEPHONE (OUTPATIENT)
Dept: ORTHOPEDIC SURGERY | Age: 47
End: 2022-12-08

## 2022-12-08 NOTE — TELEPHONE ENCOUNTER
Fax from Dr. Farhan Diaz at cardiology with Cardiac Clearance Form. Pt is cleared for surgery. Message sent to Fernando Min.

## 2023-01-06 ENCOUNTER — OFFICE VISIT (OUTPATIENT)
Dept: CARDIOLOGY CLINIC | Age: 48
End: 2023-01-06
Payer: COMMERCIAL

## 2023-01-06 ENCOUNTER — OFFICE VISIT (OUTPATIENT)
Dept: ORTHOPEDIC SURGERY | Age: 48
End: 2023-01-06

## 2023-01-06 VITALS
DIASTOLIC BLOOD PRESSURE: 82 MMHG | OXYGEN SATURATION: 99 % | SYSTOLIC BLOOD PRESSURE: 138 MMHG | HEART RATE: 95 BPM | BODY MASS INDEX: 26.43 KG/M2 | WEIGHT: 179 LBS

## 2023-01-06 DIAGNOSIS — R09.89 BILATERAL CAROTID BRUITS: ICD-10-CM

## 2023-01-06 DIAGNOSIS — Z01.818 PREOP EXAMINATION: Primary | ICD-10-CM

## 2023-01-06 DIAGNOSIS — Z86.73 HX OF TIA (TRANSIENT ISCHEMIC ATTACK) AND STROKE: ICD-10-CM

## 2023-01-06 DIAGNOSIS — Z01.818 PREOP EXAMINATION: ICD-10-CM

## 2023-01-06 DIAGNOSIS — E78.5 DYSLIPIDEMIA: ICD-10-CM

## 2023-01-06 DIAGNOSIS — I10 ESSENTIAL HYPERTENSION: Primary | ICD-10-CM

## 2023-01-06 LAB
ANION GAP SERPL CALCULATED.3IONS-SCNC: 12 MEQ/L (ref 9–15)
BUN BLDV-MCNC: 18 MG/DL (ref 6–20)
CALCIUM SERPL-MCNC: 9.6 MG/DL (ref 8.5–9.9)
CHLORIDE BLD-SCNC: 100 MEQ/L (ref 95–107)
CO2: 29 MEQ/L (ref 20–31)
CREAT SERPL-MCNC: 1.01 MG/DL (ref 0.7–1.2)
GFR SERPL CREATININE-BSD FRML MDRD: >60 ML/MIN/{1.73_M2}
GLUCOSE BLD-MCNC: 88 MG/DL (ref 70–99)
HCT VFR BLD CALC: 49.9 % (ref 42–52)
HEMOGLOBIN: 17.2 G/DL (ref 14–18)
MCH RBC QN AUTO: 31.7 PG (ref 27–31.3)
MCHC RBC AUTO-ENTMCNC: 34.5 % (ref 33–37)
MCV RBC AUTO: 91.7 FL (ref 79–92.2)
PDW BLD-RTO: 13.4 % (ref 11.5–14.5)
PLATELET # BLD: 305 K/UL (ref 130–400)
POTASSIUM SERPL-SCNC: 4.5 MEQ/L (ref 3.4–4.9)
RBC # BLD: 5.44 M/UL (ref 4.7–6.1)
SODIUM BLD-SCNC: 141 MEQ/L (ref 135–144)
WBC # BLD: 9.7 K/UL (ref 4.8–10.8)

## 2023-01-06 PROCEDURE — G8419 CALC BMI OUT NRM PARAM NOF/U: HCPCS | Performed by: INTERNAL MEDICINE

## 2023-01-06 PROCEDURE — G8427 DOCREV CUR MEDS BY ELIG CLIN: HCPCS | Performed by: INTERNAL MEDICINE

## 2023-01-06 PROCEDURE — 3075F SYST BP GE 130 - 139MM HG: CPT | Performed by: INTERNAL MEDICINE

## 2023-01-06 PROCEDURE — 4004F PT TOBACCO SCREEN RCVD TLK: CPT | Performed by: INTERNAL MEDICINE

## 2023-01-06 PROCEDURE — G8484 FLU IMMUNIZE NO ADMIN: HCPCS | Performed by: INTERNAL MEDICINE

## 2023-01-06 PROCEDURE — 99204 OFFICE O/P NEW MOD 45 MIN: CPT | Performed by: INTERNAL MEDICINE

## 2023-01-06 PROCEDURE — 3079F DIAST BP 80-89 MM HG: CPT | Performed by: INTERNAL MEDICINE

## 2023-01-06 ASSESSMENT — ENCOUNTER SYMPTOMS
BLOOD IN STOOL: 0
WHEEZING: 0
CHEST TIGHTNESS: 0
NAUSEA: 0
COUGH: 0
GASTROINTESTINAL NEGATIVE: 1
RESPIRATORY NEGATIVE: 1
SHORTNESS OF BREATH: 0
EYES NEGATIVE: 1
STRIDOR: 0

## 2023-01-06 NOTE — PROGRESS NOTES
NEW PATIENT        Patient: Rui Kitchen  YOB: 1975  MRN: [id number]    Chief Complaint: preop   Chief Complaint   Patient presents with    New Patient    Cardiac Clearance     L. CMC joint arthroplasty: 1/11/23 with Dr. Anahi Rhodes       CV Data:  12/26/2017 JING EF 55-60% no PFO   Mild Carotid plaque by prior CTA neck    Subjective/HPI  preop Evaluation for hand surgery. Pt has no cp no sob no falls no bleed. Active at work and home. 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 Social History     Socioeconomic History    Marital status:      Spouse name: None    Number of children: None    Years of education: None    Highest education level: None   Tobacco Use    Smoking status: Every Day     Packs/day: 0.50     Types: Cigarettes    Smokeless tobacco: Never   Vaping Use    Vaping Use: Never used   Substance and Sexual Activity    Alcohol use: Yes     Comment: Socially    Drug use: Yes     Types: Cocaine, Marijuana (Weed)     Comment: Last used cocaine 4/25/22    Sexual activity: Not Currently       No Known Allergies    Current Outpatient Medications   Medication Sig Dispense Refill    methylPREDNISolone (MEDROL DOSEPACK) 4 MG tablet On days 1-6 take as directed on package for first 6-day course. On days 7-12 take as directed on (second) package for (second) 6-day course.  42 tablet 0    sucralfate (CARAFATE) 1 GM tablet Take 1 tablet by mouth 4 times daily 120 tablet 0    ondansetron (ZOFRAN ODT) 4 MG disintegrating tablet Take 1 tablet by mouth every 8 hours as needed for Nausea 7 tablet 0    dicyclomine (BENTYL) 10 MG capsule Take 1 capsule by mouth every 6 hours as needed (cramps) 20 capsule 0    albuterol sulfate HFA (PROVENTIL HFA) 108 (90 Base) MCG/ACT inhaler Inhale 1-2 puffs into the lungs every 4 hours as needed for Wheezing or Shortness of Breath (Space out to every 6 hours as symptoms improve) Space out to every 6 hours as symptoms improve. 1 Inhaler 0    aspirin 81 MG chewable tablet Take 1 tablet by mouth daily 30 tablet 0    atorvastatin (LIPITOR) 80 MG tablet Take 1 tablet by mouth nightly 30 tablet 0    pantoprazole (PROTONIX) 40 MG tablet Take 1 tablet by mouth 2 times daily (before meals) 60 tablet 0     No current facility-administered medications for this visit. Review of Systems:   Review of Systems   Constitutional: Negative. Negative for diaphoresis and fatigue. HENT: Negative. Eyes: Negative. Respiratory: Negative. Negative for cough, chest tightness, shortness of breath, wheezing and stridor. Cardiovascular: Negative. Negative for chest pain, palpitations and leg swelling. Gastrointestinal: Negative. Negative for blood in stool and nausea. Genitourinary: Negative. Musculoskeletal: Negative. Skin: Negative. Neurological: Negative. Negative for dizziness, syncope, weakness and light-headedness. Hematological: Negative. Psychiatric/Behavioral: Negative. Physical Examination:    /82 (Site: Left Upper Arm, Position: Sitting, Cuff Size: Medium Adult)   Pulse 95   Wt 179 lb (81.2 kg)   SpO2 99%   BMI 26.43 kg/m²    Physical Exam   Constitutional: He appears healthy. No distress. HENT:   Normal cephalic and Atraumatic   Eyes: Pupils are equal, round, and reactive to light. Neck: Thyroid normal. No JVD present. No neck adenopathy. No thyromegaly present. Cardiovascular: Normal rate, regular rhythm, normal heart sounds, intact distal pulses and normal pulses. Pulmonary/Chest: Effort normal and breath sounds normal. He has no wheezes. He has no rales. He exhibits no tenderness. Abdominal: Soft. Bowel sounds are normal. There is no abdominal tenderness. Musculoskeletal:         General: No tenderness or edema. Normal range of motion. Cervical back: Normal range of motion and neck supple. Neurological: He is alert and oriented to person, place, and time. Skin: Skin is warm. No cyanosis. Nails show no clubbing.      LABS:  CBC:   Lab Results   Component Value Date/Time    WBC 13.4 04/26/2022 10:00 AM    RBC 5.75 04/26/2022 10:00 AM    HGB 20.0 04/26/2022 10:15 AM    HCT 53.8 04/26/2022 10:00 AM    MCV 93.6 04/26/2022 10:00 AM    MCH 31.9 04/26/2022 10:00 AM    MCHC 34.1 04/26/2022 10:00 AM    RDW 12.8 04/26/2022 10:00 AM     04/26/2022 10:00 AM    MPV 9.1 05/15/2018 09:15 AM     Lipids:  Lab Results   Component Value Date    CHOL 263 07/19/2021    CHOL 310 (H) 01/21/2020    CHOL 310 01/21/2020     Lab Results   Component Value Date    TRIG 163 07/19/2021    TRIG 163 (H) 01/21/2020    TRIG 163 01/21/2020     Lab Results   Component Value Date    HDL 31 (A) 07/19/2021    HDL 42 01/21/2020    HDL 42 01/21/2020     Lab Results   Component Value Date    LDLCHOLESTEROL 117 05/15/2018    LDLCHOLESTEROL 158 (H) 12/23/2017    LDLCALC 201 (A) 07/19/2021    LDLCALC 235 (H) 01/21/2020    LDLCALC 235 (A) 01/21/2020     Lab Results   Component Value Date    VLDL 31 07/19/2021    VLDL NOT REPORTED 05/15/2018    VLDL NOT REPORTED 12/23/2017     Lab Results   Component Value Date    CHOLHDLRATIO 5.6 (H) 05/15/2018    CHOLHDLRATIO 7.7 (H) 12/23/2017     CMP:    Lab Results   Component Value Date/Time     04/26/2022 10:00 AM    K 4.7 04/26/2022 10:00 AM    CL 99 04/26/2022 10:00 AM    CO2 26 04/26/2022 10:00 AM    BUN 11 04/26/2022 10:00 AM    CREATININE 0.9 04/26/2022 10:15 AM    CREATININE 0.93 04/26/2022 10:00 AM    GFRAA >60 04/26/2022 10:15 AM    LABGLOM >60 04/26/2022 10:15 AM    GLUCOSE 115 04/26/2022 10:00 AM    PROT 9.1 04/26/2022 10:00 AM    LABALBU 5.3 04/26/2022 10:00 AM    CALCIUM 10.1 04/26/2022 10:00 AM    BILITOT <0.2 04/26/2022 10:00 AM    ALKPHOS 105 04/26/2022 10:00 AM    AST 29 04/26/2022 10:00 AM    ALT 42 04/26/2022 10:00 AM     BMP:    Lab Results   Component Value Date/Time     04/26/2022 10:00 AM    K 4.7 04/26/2022 10:00 AM    CL 99 04/26/2022 10:00 AM    CO2 26 04/26/2022 10:00 AM    BUN 11 04/26/2022 10:00 AM    LABALBU 5.3 04/26/2022 10:00 AM    CREATININE 0.9 04/26/2022 10:15 AM    CREATININE 0.93 04/26/2022 10:00 AM    CALCIUM 10.1 04/26/2022 10:00 AM    GFRAA >60 04/26/2022 10:15 AM    LABGLOM >60 04/26/2022 10:15 AM    GLUCOSE 115 04/26/2022 10:00 AM     Magnesium:    Lab Results   Component Value Date/Time    MG 2.2 04/26/2022 10:00 AM     TSH:  Lab Results   Component Value Date    TSH 1.10 05/15/2018             Patient Active Problem List   Diagnosis    Stroke-like symptoms    Marijuana abuse    Hx of TIA (transient ischemic attack) and stroke    Other hyperlipidemia    Chronic ischemic vertebrobasilar artery brainstem stroke    Vertebrobasilar artery insufficiency    Left-sided weakness    Vertebral artery stenosis, left    Arthritis of carpometacarpal (CMC) joint of both thumbs       There are no discontinued medications. Modified Medications    No medications on file       No orders of the defined types were placed in this encounter. Assessment/Plan:    1. Essential hypertension   Stable continue meds. Low salt diet     2. Dyslipidemia  Statin continue. Low fat diet    3. Hx of TIA (transient ischemic attack) and stroke       4. Bilateral carotid bruits   Will continue surveillance. Pt is cleared for planned Left thumb surgery    Counseling:  Heart Healthy Lifestyle, Stop Smoking, Low Salt Diet, Take Precautions to Prevent Falls, and Walk Daily    Return in about 6 months (around 7/6/2023).       Electronically signed by Isaiah Terry MD on 1/6/2023 at 12:53 PM

## 2023-01-06 NOTE — PATIENT INSTRUCTIONS
-please walk over to our lab for your blood work, located right outside our office near the elevators    -stop taking asprin and motrin until after your surgery. All blood thinners need to be stopped at least 5 days in advance prior to surgery. If you experiencing pain, the only medication you can take for that is tylenol 3-4x / day not to exceed 4000 mg.  -our surgery department will reach out the you the day before your surgery and let you know what time to arrive at the [address] [place] [address] Road (door B)  -no eating / drinking the after midnight the night before surgery.  Sips of water the morning of for medications is OK  -if you have any questions, please call our office and I will be happy to answer them

## 2023-01-06 NOTE — PROGRESS NOTES
[facility]    H&P: Preadmission Testing     Patient: Monico Leon  YOB: 1975  MRN: [id number]    Subjective:   No chief complaint on file.      HPI: Monico Leon is a 47 y.o. maleis here for preop evaluation for left CMC arthroplasty  They have a pertinent history of TIA, hypertension, carotid stenosis    Normal history of hypertension, TIA, bilateral carotid stenosis. There is no recent chest pain or discomfort, palpitations, shortness of breath, OLIVEIRA, difficulties with exercise, bilateral ankle swelling.  Not taking any blood thinners.  We will hold ASA until after surgery.    There is no known history of obstructive or restrictive lung disease.    History of smoking, we documented negative consequences of this both long-term and short-term for 4 minutes.  No recent wheezing or use of any kind of inhalers.  No recent hospitalizations for any lung-related illnesses. No recent Covid infection.    No known history of gastric issues which includes ulcers, herniations, bariatric surgeries.  No recent GI bleeds    No known history of hyper or hypercoagulable states.    They are not diabetic.  They have normal kidney function.     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  On days 7-12 take as directed on (second) package for (second) 6-day course. 42 tablet 0    pantoprazole (PROTONIX) 40 MG tablet Take 1 tablet by mouth 2 times daily (before meals) 60 tablet 0    sucralfate  (CARAFATE) 1 GM tablet Take 1 tablet by mouth 4 times daily 120 tablet 0    ondansetron (ZOFRAN ODT) 4 MG disintegrating tablet Take 1 tablet by mouth every 8 hours as needed for Nausea 7 tablet 0    dicyclomine (BENTYL) 10 MG capsule Take 1 capsule by mouth every 6 hours as needed (cramps) 20 capsule 0    albuterol sulfate HFA (PROVENTIL HFA) 108 (90 Base) MCG/ACT inhaler Inhale 1-2 puffs into the lungs every 4 hours as needed for Wheezing or Shortness of Breath (Space out to every 6 hours as symptoms improve) Space out to every 6 hours as symptoms improve. 1 Inhaler 0    aspirin 81 MG chewable tablet Take 1 tablet by mouth daily 30 tablet 0    atorvastatin (LIPITOR) 80 MG tablet Take 1 tablet by mouth nightly 30 tablet 0     No current facility-administered medications for this visit. Allergies:    Patient has no known allergies. Social History:   Social History     Socioeconomic History    Marital status:      Spouse name: Not on file    Number of children: Not on file    Years of education: Not on file    Highest education level: Not on file   Occupational History    Not on file   Tobacco Use    Smoking status: Every Day     Packs/day: 0.50     Types: Cigarettes    Smokeless tobacco: Never   Vaping Use    Vaping Use: Never used   Substance and Sexual Activity    Alcohol use: Yes     Comment: Socially    Drug use: Yes     Types: Cocaine, Marijuana (Weed)     Comment: Last used cocaine 4/25/22    Sexual activity: Not Currently   Other Topics Concern    Not on file   Social History Narrative    Not on file     Social Determinants of Health     Financial Resource Strain: Not on file   Food Insecurity: Not on file   Transportation Needs: Not on file   Physical Activity: Not on file   Stress: Not on file   Social Connections: Not on file   Intimate Partner Violence: Not on file   Housing Stability: Not on file       Family History:   No family history on file. Objective:    There were no vitals taken for this visit. Physical Exam  Constitutional:       General: He is not in acute distress. Appearance: Normal appearance. He is not ill-appearing. HENT:      Head: Normocephalic. Nose: Nose normal. No congestion or rhinorrhea. Mouth/Throat:      Mouth: Mucous membranes are moist.      Pharynx: Oropharynx is clear. No oropharyngeal exudate or posterior oropharyngeal erythema. Eyes:      Extraocular Movements: Extraocular movements intact. Pupils: Pupils are equal, round, and reactive to light. Cardiovascular:      Rate and Rhythm: Normal rate and regular rhythm. Pulses: Normal pulses. Heart sounds: Normal heart sounds. Pulmonary:      Effort: Pulmonary effort is normal.      Breath sounds: Normal breath sounds. No wheezing, rhonchi or rales. Abdominal:      General: Abdomen is flat. Bowel sounds are normal.      Palpations: Abdomen is soft. Tenderness: There is no abdominal tenderness. Skin:     General: Skin is warm and dry. Capillary Refill: Capillary refill takes less than 2 seconds. Comments: No swelling or erythema over the incision site   Neurological:      General: No focal deficit present. Mental Status: He is alert and oriented to person, place, and time. Radiographs and Laboratory Studies:   EKG:  Cleared by cardio  Laboratory Studies:   Lab Results   Component Value Date    WBC 13.4 (H) 04/26/2022    HGB 20.0 (H) 04/26/2022    HCT 53.8 (H) 04/26/2022    MCV 93.6 04/26/2022     04/26/2022     No results found for: SEDRATE  No results found for: CRP    Assessment and Plan:      Diagnosis Orders   1. Preop examination  CBC    Basic Metabolic Panel          Procedure: Left CMC arthroplasty  Blood work and EKG was ordered and will be reviewed. I'll see them back 2 weeks postoperatively.     Ana Mitchell PA-C  [facility]  [phone]

## 2023-01-11 ENCOUNTER — ANESTHESIA (OUTPATIENT)
Dept: OPERATING ROOM | Age: 48
End: 2023-01-11
Payer: COMMERCIAL

## 2023-01-11 ENCOUNTER — ANESTHESIA EVENT (OUTPATIENT)
Dept: OPERATING ROOM | Age: 48
End: 2023-01-11
Payer: COMMERCIAL

## 2023-01-11 ENCOUNTER — HOSPITAL ENCOUNTER (OUTPATIENT)
Age: 48
Setting detail: OUTPATIENT SURGERY
Discharge: HOME | End: 2023-01-11
Attending: ORTHOPAEDIC SURGERY | Admitting: ORTHOPAEDIC SURGERY
Payer: COMMERCIAL

## 2023-01-11 VITALS
HEIGHT: 69 IN | BODY MASS INDEX: 26.51 KG/M2 | DIASTOLIC BLOOD PRESSURE: 63 MMHG | SYSTOLIC BLOOD PRESSURE: 135 MMHG | WEIGHT: 179 LBS | HEART RATE: 72 BPM | TEMPERATURE: 98 F | OXYGEN SATURATION: 95 % | RESPIRATION RATE: 16 BRPM

## 2023-01-11 DIAGNOSIS — M18.0 ARTHRITIS OF CARPOMETACARPAL (CMC) JOINT OF BOTH THUMBS: Primary | ICD-10-CM

## 2023-01-11 PROCEDURE — 3700000001 HC ADD 15 MINUTES (ANESTHESIA): Performed by: ORTHOPAEDIC SURGERY

## 2023-01-11 PROCEDURE — 25447 ARTHRP NTRCRP/CRP/MTCR NTRPS: CPT | Performed by: ORTHOPAEDIC SURGERY

## 2023-01-11 PROCEDURE — 7100000000 HC PACU RECOVERY - FIRST 15 MIN: Performed by: ORTHOPAEDIC SURGERY

## 2023-01-11 PROCEDURE — 2580000003 HC RX 258: Performed by: ORTHOPAEDIC SURGERY

## 2023-01-11 PROCEDURE — 2580000003 HC RX 258: Performed by: ANESTHESIOLOGY

## 2023-01-11 PROCEDURE — 6360000002 HC RX W HCPCS

## 2023-01-11 PROCEDURE — 64415 NJX AA&/STRD BRCH PLXS IMG: CPT | Performed by: ANESTHESIOLOGY

## 2023-01-11 PROCEDURE — 7100000001 HC PACU RECOVERY - ADDTL 15 MIN: Performed by: ORTHOPAEDIC SURGERY

## 2023-01-11 PROCEDURE — 6360000002 HC RX W HCPCS: Performed by: ANESTHESIOLOGY

## 2023-01-11 PROCEDURE — 6360000002 HC RX W HCPCS: Performed by: ORTHOPAEDIC SURGERY

## 2023-01-11 PROCEDURE — 2709999900 HC NON-CHARGEABLE SUPPLY: Performed by: ORTHOPAEDIC SURGERY

## 2023-01-11 PROCEDURE — 3600000013 HC SURGERY LEVEL 3 ADDTL 15MIN: Performed by: ORTHOPAEDIC SURGERY

## 2023-01-11 PROCEDURE — 3600000003 HC SURGERY LEVEL 3 BASE: Performed by: ORTHOPAEDIC SURGERY

## 2023-01-11 PROCEDURE — 25310 TRANSPLANT FOREARM TENDON: CPT | Performed by: ORTHOPAEDIC SURGERY

## 2023-01-11 PROCEDURE — 2500000003 HC RX 250 WO HCPCS: Performed by: ANESTHESIOLOGY

## 2023-01-11 PROCEDURE — A4217 STERILE WATER/SALINE, 500 ML: HCPCS | Performed by: ORTHOPAEDIC SURGERY

## 2023-01-11 PROCEDURE — 3700000000 HC ANESTHESIA ATTENDED CARE: Performed by: ORTHOPAEDIC SURGERY

## 2023-01-11 PROCEDURE — 7100000011 HC PHASE II RECOVERY - ADDTL 15 MIN: Performed by: ORTHOPAEDIC SURGERY

## 2023-01-11 PROCEDURE — 7100000010 HC PHASE II RECOVERY - FIRST 15 MIN: Performed by: ORTHOPAEDIC SURGERY

## 2023-01-11 RX ORDER — SODIUM CHLORIDE 0.9 % (FLUSH) 0.9 %
5-40 SYRINGE (ML) INJECTION PRN
Status: DISCONTINUED | OUTPATIENT
Start: 2023-01-11 | End: 2023-01-11 | Stop reason: HOSPADM

## 2023-01-11 RX ORDER — SODIUM CHLORIDE, SODIUM LACTATE, POTASSIUM CHLORIDE, CALCIUM CHLORIDE 600; 310; 30; 20 MG/100ML; MG/100ML; MG/100ML; MG/100ML
INJECTION, SOLUTION INTRAVENOUS CONTINUOUS
Status: DISCONTINUED | OUTPATIENT
Start: 2023-01-11 | End: 2023-01-11 | Stop reason: HOSPADM

## 2023-01-11 RX ORDER — SODIUM CHLORIDE 9 MG/ML
INJECTION, SOLUTION INTRAVENOUS PRN
Status: DISCONTINUED | OUTPATIENT
Start: 2023-01-11 | End: 2023-01-11 | Stop reason: HOSPADM

## 2023-01-11 RX ORDER — SODIUM CHLORIDE 0.9 % (FLUSH) 0.9 %
5-40 SYRINGE (ML) INJECTION EVERY 12 HOURS SCHEDULED
Status: DISCONTINUED | OUTPATIENT
Start: 2023-01-11 | End: 2023-01-11 | Stop reason: HOSPADM

## 2023-01-11 RX ORDER — MAGNESIUM HYDROXIDE 1200 MG/15ML
LIQUID ORAL CONTINUOUS PRN
Status: DISCONTINUED | OUTPATIENT
Start: 2023-01-11 | End: 2023-01-11 | Stop reason: HOSPADM

## 2023-01-11 RX ORDER — OXYCODONE HYDROCHLORIDE 5 MG/1
5 TABLET ORAL EVERY 4 HOURS PRN
Status: DISCONTINUED | OUTPATIENT
Start: 2023-01-11 | End: 2023-01-11 | Stop reason: HOSPADM

## 2023-01-11 RX ORDER — LIDOCAINE HYDROCHLORIDE 10 MG/ML
INJECTION, SOLUTION EPIDURAL; INFILTRATION; INTRACAUDAL; PERINEURAL PRN
Status: DISCONTINUED | OUTPATIENT
Start: 2023-01-11 | End: 2023-01-11 | Stop reason: SDUPTHER

## 2023-01-11 RX ORDER — PROPOFOL 10 MG/ML
INJECTION, EMULSION INTRAVENOUS PRN
Status: DISCONTINUED | OUTPATIENT
Start: 2023-01-11 | End: 2023-01-11 | Stop reason: SDUPTHER

## 2023-01-11 RX ORDER — MORPHINE SULFATE 4 MG/ML
4 INJECTION, SOLUTION INTRAMUSCULAR; INTRAVENOUS
Status: DISCONTINUED | OUTPATIENT
Start: 2023-01-11 | End: 2023-01-11 | Stop reason: HOSPADM

## 2023-01-11 RX ORDER — ROPIVACAINE HYDROCHLORIDE 5 MG/ML
INJECTION, SOLUTION EPIDURAL; INFILTRATION; PERINEURAL PRN
Status: DISCONTINUED | OUTPATIENT
Start: 2023-01-11 | End: 2023-01-11 | Stop reason: SDUPTHER

## 2023-01-11 RX ORDER — MORPHINE SULFATE 2 MG/ML
2 INJECTION, SOLUTION INTRAMUSCULAR; INTRAVENOUS
Status: DISCONTINUED | OUTPATIENT
Start: 2023-01-11 | End: 2023-01-11 | Stop reason: HOSPADM

## 2023-01-11 RX ORDER — ONDANSETRON 2 MG/ML
INJECTION INTRAMUSCULAR; INTRAVENOUS PRN
Status: DISCONTINUED | OUTPATIENT
Start: 2023-01-11 | End: 2023-01-11 | Stop reason: SDUPTHER

## 2023-01-11 RX ORDER — MIDAZOLAM HYDROCHLORIDE 1 MG/ML
INJECTION INTRAMUSCULAR; INTRAVENOUS PRN
Status: DISCONTINUED | OUTPATIENT
Start: 2023-01-11 | End: 2023-01-11 | Stop reason: SDUPTHER

## 2023-01-11 RX ORDER — OXYCODONE HYDROCHLORIDE AND ACETAMINOPHEN 5; 325 MG/1; MG/1
1 TABLET ORAL EVERY 4 HOURS PRN
Qty: 20 TABLET | Refills: 0 | Status: SHIPPED | OUTPATIENT
Start: 2023-01-11 | End: 2023-01-16

## 2023-01-11 RX ORDER — SODIUM CHLORIDE 9 MG/ML
50 INJECTION, SOLUTION INTRAVENOUS CONTINUOUS
Status: DISCONTINUED | OUTPATIENT
Start: 2023-01-11 | End: 2023-01-11 | Stop reason: HOSPADM

## 2023-01-11 RX ORDER — LIDOCAINE HYDROCHLORIDE 10 MG/ML
2 INJECTION, SOLUTION EPIDURAL; INFILTRATION; INTRACAUDAL; PERINEURAL ONCE
Status: COMPLETED | OUTPATIENT
Start: 2023-01-11 | End: 2023-01-11

## 2023-01-11 RX ADMIN — MIDAZOLAM HYDROCHLORIDE 2 MG: 1 INJECTION, SOLUTION INTRAMUSCULAR; INTRAVENOUS at 10:11

## 2023-01-11 RX ADMIN — PHENYLEPHRINE HYDROCHLORIDE 200 MCG: 10 INJECTION INTRAVENOUS at 11:26

## 2023-01-11 RX ADMIN — PHENYLEPHRINE HYDROCHLORIDE 200 MCG: 10 INJECTION INTRAVENOUS at 10:46

## 2023-01-11 RX ADMIN — PHENYLEPHRINE HYDROCHLORIDE 200 MCG: 10 INJECTION INTRAVENOUS at 10:51

## 2023-01-11 RX ADMIN — PHENYLEPHRINE HYDROCHLORIDE 100 MCG: 10 INJECTION INTRAVENOUS at 11:16

## 2023-01-11 RX ADMIN — PHENYLEPHRINE HYDROCHLORIDE 200 MCG: 10 INJECTION INTRAVENOUS at 11:18

## 2023-01-11 RX ADMIN — LIDOCAINE HYDROCHLORIDE 10 ML: 10 INJECTION, SOLUTION EPIDURAL; INFILTRATION; INTRACAUDAL; PERINEURAL at 10:15

## 2023-01-11 RX ADMIN — LIDOCAINE HYDROCHLORIDE 0.1 ML: 10 INJECTION, SOLUTION EPIDURAL; INFILTRATION; INTRACAUDAL; PERINEURAL at 09:53

## 2023-01-11 RX ADMIN — PHENYLEPHRINE HYDROCHLORIDE 300 MCG: 10 INJECTION INTRAVENOUS at 11:06

## 2023-01-11 RX ADMIN — CEFAZOLIN 2000 MG: 10 INJECTION, POWDER, FOR SOLUTION INTRAVENOUS at 10:27

## 2023-01-11 RX ADMIN — PROPOFOL 100 MG: 10 INJECTION, EMULSION INTRAVENOUS at 10:24

## 2023-01-11 RX ADMIN — PHENYLEPHRINE HYDROCHLORIDE 200 MCG: 10 INJECTION INTRAVENOUS at 10:57

## 2023-01-11 RX ADMIN — ONDANSETRON 4 MG: 2 INJECTION INTRAMUSCULAR; INTRAVENOUS at 10:52

## 2023-01-11 RX ADMIN — ROPIVACAINE HYDROCHLORIDE 25 ML: 5 INJECTION, SOLUTION EPIDURAL; INFILTRATION; PERINEURAL at 10:15

## 2023-01-11 RX ADMIN — PHENYLEPHRINE HYDROCHLORIDE 100 MCG: 10 INJECTION INTRAVENOUS at 10:41

## 2023-01-11 RX ADMIN — PROPOFOL 100 MCG/KG/MIN: 10 INJECTION, EMULSION INTRAVENOUS at 10:27

## 2023-01-11 RX ADMIN — PHENYLEPHRINE HYDROCHLORIDE 100 MCG: 10 INJECTION INTRAVENOUS at 11:11

## 2023-01-11 RX ADMIN — SODIUM CHLORIDE, POTASSIUM CHLORIDE, SODIUM LACTATE AND CALCIUM CHLORIDE 1000 ML: 600; 310; 30; 20 INJECTION, SOLUTION INTRAVENOUS at 09:53

## 2023-01-11 ASSESSMENT — PAIN DESCRIPTION - ORIENTATION: ORIENTATION: LEFT

## 2023-01-11 ASSESSMENT — PAIN DESCRIPTION - LOCATION: LOCATION: ARM

## 2023-01-11 ASSESSMENT — PAIN SCALES - GENERAL
PAINLEVEL_OUTOF10: 0
PAINLEVEL_OUTOF10: 0

## 2023-01-11 NOTE — ANESTHESIA PRE PROCEDURE
Department of Anesthesiology  Preprocedure Note       Name:  Anne Yin   Age:  49 y.o.  :  1975                                          MRN:  [id number]         Date:  2023      Surgeon: Wilfredo Palma):  Batool Angel MD    Procedure: Procedure(s):  LEFT CMC (CARPOMETACARPAL) JOINT ARTHROPLASTY WITH PALMARIS LONGUS TENDON INTERPOSITION, TRANSFER  (PAT ALEX)    Medications prior to admission:   Prior to Admission medications    Medication Sig Start Date End Date Taking?  Authorizing Provider   pantoprazole (PROTONIX) 40 MG tablet Take 1 tablet by mouth 2 times daily (before meals) 22  Lenon Kehr A Romito, DO   aspirin 81 MG chewable tablet Take 1 tablet by mouth daily 3/8/18   Anita Alfaro MD       Current medications:    Current Facility-Administered Medications   Medication Dose Route Frequency Provider Last Rate Last Admin    ceFAZolin (ANCEF) 2000 mg in dextrose 5 % 100 mL IVPB  2,000 mg IntraVENous On Call to 29 Seth Forte MD        lactated ringers infusion   IntraVENous Continuous Desean Lira  mL/hr at 23 0953 1,000 mL at 23 9433       Allergies:  No Known Allergies    Problem List:    Patient Active Problem List   Diagnosis Code    Stroke-like symptoms R29.90    Marijuana abuse F12.10    Hx of TIA (transient ischemic attack) and stroke Z86.73    Other hyperlipidemia E78.49    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    Vertebrobasilar artery insufficiency G45.0    Left-sided weakness R53.1    Vertebral artery stenosis, left I65.02    Arthritis of carpometacarpal (CMC) joint of both thumbs M18.0       Past Medical History:        Diagnosis Date    Arthritis of carpometacarpal ([address]) joint of both thumbs 10/26/2022    Cerebrovascular accident (CVA) ([facility] [address]     Hepatitis C     Hyperlipidemia 2017    Smoker        Past Surgical History:        Procedure Laterality Date    ABDOMEN SURGERY      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 Social History:    Social History     Tobacco Use    Smoking status: Every Day     Packs/day: 0.50     Types: Cigarettes    Smokeless tobacco: Never   Substance Use Topics    Alcohol use: Yes     Comment: Socially                                Ready to quit: Not Answered  Counseling given: Not Answered      Vital Signs (Current):   Vitals:    01/11/23 0930   BP: (!) 142/79   Pulse: 83   Resp: 20   Temp: 97.4 °F (36.3 °C)   TempSrc: Temporal   SpO2: 97%   Weight: 179 lb (81.2 kg)   Height: 5' 9\" (1.753 m)                                              BP Readings from Last 3 Encounters:   01/11/23 (!) 142/79   01/06/23 138/82   10/15/22 (!) 155/100       NPO Status: Time of last liquid consumption: 0000                        Time of last solid consumption: 0000                        Date of last liquid consumption: 01/11/23                        Date of last solid food consumption: 01/11/23    BMI:   Wt Readings from Last 3 Encounters:   01/11/23 179 lb (81.2 kg)   01/06/23 179 lb (81.2 kg)   10/15/22 150 lb (68 kg)     Body mass index is 26.43 kg/m².     CBC:   Lab Results   Component Value Date/Time    WBC 9.7 01/06/2023 03:57 PM    RBC 5.44 01/06/2023 03:57 PM    HGB 17.2 01/06/2023 03:57 PM    HCT 49.9 01/06/2023 03:57 PM    MCV 91.7 01/06/2023 03:57 PM    RDW 13.4 01/06/2023 03:57 PM     01/06/2023 03:57 PM       CMP:   Lab Results   Component Value Date/Time     01/06/2023 03:57 PM    K 4.5 01/06/2023 03:57 PM     01/06/2023 03:57 PM    CO2 29 01/06/2023 03:57 PM    BUN 18 01/06/2023 03:57 PM    CREATININE 1.01 01/06/2023 03:57 PM    GFRAA >60 04/26/2022 10:15 AM    LABGLOM >60.0 01/06/2023 03:57 PM    GLUCOSE 88 01/06/2023 03:57 PM    PROT 9.1 04/26/2022 10:00 AM    CALCIUM 9.6 01/06/2023 03:57 PM    BILITOT <0.2 04/26/2022 10:00 AM    ALKPHOS 105 04/26/2022 10:00 AM    AST 29 04/26/2022 10:00 AM    ALT 42 04/26/2022 10:00 AM       POC Tests: No results for input(s): POCGLU, POCNA, POCK, POCCL, POCBUN, POCHEMO, POCHCT in the last 72 hours. Coags:   Lab Results   Component Value Date/Time    PROTIME 12.9 04/26/2022 10:00 AM    INR 1.0 04/26/2022 10:00 AM    APTT 27.3 04/26/2022 10:00 AM       HCG (If Applicable): No results found for: PREGTESTUR, PREGSERUM, HCG, HCGQUANT     ABGs: No results found for: PHART, PO2ART, LBO9RZE, RVH6NLW, BEART, G4FZXXLB     Type & Screen (If Applicable):  No results found for: LABABO, LABRH    Drug/Infectious Status (If Applicable):  Lab Results   Component Value Date/Time    HEPCAB REACTIVE 05/15/2018 09:15 AM       COVID-19 Screening (If Applicable): No results found for: COVID19        Anesthesia Evaluation    Airway: Mallampati: II  TM distance: >3 FB   Neck ROM: full  Mouth opening: > = 3 FB   Dental:    (+) upper dentures and lower dentures      Pulmonary: breath sounds clear to auscultation                             Cardiovascular:Negative CV ROS            Rhythm: regular                      Neuro/Psych:   (+) CVA: no interval change,             GI/Hepatic/Renal:   (+) hepatitis: C, liver disease:,           Endo/Other: Negative Endo/Other ROS                    Abdominal:             Vascular: negative vascular ROS. Other Findings:           Anesthesia Plan      MAC and regional     ASA 2     (US guided Infraclavicular block)  Induction: intravenous. MIPS: Prophylactic antiemetics administered. Anesthetic plan and risks discussed with patient. Plan discussed with CRNA.     Attending anesthesiologist reviewed and agrees with Preprocedure content      Post-op pain plan if not by surgeon: single peripheral nerve block            Yenni Rodriges MD   1/11/2023

## 2023-01-11 NOTE — OP NOTE
Operative Note      Patient: Irlanda Hernandez  YOB: 1975  MRN: [id number]    Date of Procedure: 1/11/2023    Pre-Op Diagnosis: LEFT CMC JOINT ARTHRITIS    Post-Op Diagnosis: Same       Procedure(s):  LEFT CMC (CARPOMETACARPAL) JOINT ARTHROPLASTY WITH PALMARIS LONGUS TENDON INTERPOSITION, TRANSFER    Surgeon(s):  Da Krishnamurthy MD    Assistant:   Physician Assistant: Nette Perez PA-C; Maday Hatch PA-C    Anesthesia: General    Estimated Blood Loss (mL): Minimal    Complications: None    Specimens:   * No specimens in log *    Implants:  * No implants in log *      Drains: * No LDAs found *    Findings: Significantly arthritic CMC joint. Adequate palmaris longus tendon transfer    Detailed Description of Procedure:     Brief clinical note: The patient presents for a left [facility] joint arthroplasty. The patient is failed conservative measures. The patient wished to proceed with an arthroplasty. The patient has a palmaris longus tendon intact on that side. Risks and benefits of surgery were discussed with the patient in detail. These include but not limited to injury soft tissue nerves and vessels, medical and anesthetic risks, continued pain discomfort, incisional problems, infection, and sensory loss distal or at the incision. The patient wished to proceed operatively. The patient is consented and marked. The patient has received a preoperative antibiotic. Operative note:    Patient is taken the op room after anesthesia is administered the left upper extremity prepped and draped usual manner. A final timeout is done with the operative team.  Patient did receive an antibiotic. Esmarch exsanguination is done and the tourniquet is inflated. An incision is carried out transversely over the [facility] joint of the thumb. Longitudinal dissection is then performed. Sensory nerves are identified. The APL and EPB tendons are identified and retracted.   A longitudinal incision is then made in the capsule. Careful elevation around the greater multangular was done with a tenotomy. Once it was clearly identified it was removed in a piecemeal fashion with an osteotome and rongeurs. The underlying FCR tendon was intact and protected throughout the case. All debris was removed with lavage and areas now prepared for the arthroplasty. Attention was turned to the volar aspect of the wrist.  2 transverse incision is carried out of the lower aspect of the wrist.  Palmaris tendon is identified distally through that incision and more proximally about 5 6 cm proximal to the wrist crease. Identified and does both locations is removed distally and cut and reflected back through the more proximal incision. Using a tendon stripper after dissecting way some fascia the rest of the palmaris was then harvested via the tendon stripper. Those incisions were closed with a combination of Monocryl and nylon. That palmaris tendon that was harvested was then placed in an anchovy fashion under routine technique with Vicryl suture. Once it fully rounded it is then placed transferred between the first metacarpal and the distal pole of the scaphoid attached to the FCR tendon with a nonabsorbable stitch. This transfer the palmaris longus was successful distally with a grade of 2 multangular was removed. Interposed nicely now between the 2 bones. Is now interposed nicely acting is a nice interposition between those 2 bones. The capsule was then closed with the thumb and in abducted position with multiple FiberWire suture. The sensory nerves and tendons were allowed to fall back into position. The skin was closed in that location as well with Monocryl and nylon. Dressings applied include Xeroform fluffs web roll and a thumb spica splint. Disposition will be the recovery room in home. The patient had no intraoperative complications and tolerated the procedure well.       Electronically signed by Jose Camacho MD on 1/11/2023 at 11:08 AM

## 2023-01-11 NOTE — ANESTHESIA POSTPROCEDURE EVALUATION
Department of Anesthesiology  Postprocedure Note    Patient: Patricia Johnson  MRN: [id number]  YOB: 1975  Date of evaluation: 1/11/2023      Procedure Summary     Date: 01/11/23 Room / Location: [address]    Anesthesia Start: 1019 Anesthesia Stop: 1782    Procedure: LEFT CMC (CARPOMETACARPAL) JOINT ARTHROPLASTY WITH PALMARIS LONGUS TENDON INTERPOSITION, TRANSFER (Left: Hand) Diagnosis:       Primary arthrosis of first carpometacarpal joints, bilateral      (LEFT [facility] JOINT ARTHRITIS)    Surgeons: Shar Alvarado MD Responsible Provider: Colby Koehler MD    Anesthesia Type: MAC, regional ASA Status: 2          Anesthesia Type: No value filed.     Sulema Phase I: Sulema Score: 10    Sulema Phase II:        Anesthesia Post Evaluation    Patient location during evaluation: PACU  Patient participation: complete - patient cannot participate  Level of consciousness: awake and alert  Pain score: 0  Airway patency: patent  Nausea & Vomiting: no nausea and no vomiting  Complications: no  Cardiovascular status: hemodynamically stable  Respiratory status: acceptable  Hydration status: euvolemic

## 2023-01-11 NOTE — DISCHARGE INSTRUCTIONS
Please keep dressing on and dry until follow-up. Elevate is much as possible. May ice as needed. Cover for showering. Call office for appointment 10 to 14 days.

## 2023-01-11 NOTE — ANESTHESIA PROCEDURE NOTES
Peripheral Block    Patient location during procedure: pre-op  Reason for block: post-op pain management and at surgeon's request  Start time: 1/11/2023 10:12 AM  End time: 1/11/2023 10:17 AM  Staffing  Performed: anesthesiologist   Anesthesiologist: Sammi Nguyen MD  Preanesthetic Checklist  Completed: patient identified, IV checked, site marked, risks and benefits discussed, surgical/procedural consents, equipment checked, pre-op evaluation, timeout performed, anesthesia consent given, oxygen available and monitors applied/VS acknowledged  Peripheral Block   Patient position: supine  Prep: ChloraPrep  Provider prep: mask and sterile gloves (Sterile probe cover)  Patient monitoring: cardiac monitor, continuous pulse ox, frequent blood pressure checks and IV access  Block type: Brachial plexus  Infraclavicular  Laterality: left  Injection technique: single-shot  Guidance: ultrasound guided  Local infiltration: ropivacaine and lidocaine  Infiltration strength: 0.5 %  Local infiltration: ropivacaine and lidocaine  Dose: 25 mLDose: 10 mL    Needle   Needle type: combined needle/nerve stimulator   Needle gauge: 21 G  Needle localization: anatomical landmarks and ultrasound guidance  Needle length: 10 cm  Assessment   Injection assessment: negative aspiration for heme, no paresthesia on injection and local visualized surrounding nerve on ultrasound  Paresthesia pain: immediately resolved  Slow fractionated injection: yes  Hemodynamics: stable  Real-time US image taken/store: yes    Additional Notes  Ultrasound image printed and saved in patient chart.     Sterile probe cover used

## 2023-01-12 ENCOUNTER — TELEPHONE (OUTPATIENT)
Dept: ORTHOPEDIC SURGERY | Age: 48
End: 2023-01-12

## 2023-01-12 DIAGNOSIS — M18.0 ARTHRITIS OF CARPOMETACARPAL (CMC) JOINT OF BOTH THUMBS: Primary | ICD-10-CM

## 2023-01-12 NOTE — TELEPHONE ENCOUNTER
Pt calling stating he cannot take Oxycodone-Acetaminophen due to being allergic to Acetaminophen and said everyone was aware of this allergy. Pt is asking for plain Oxycodone. Please advise.

## 2023-01-13 RX ORDER — OXYCODONE HYDROCHLORIDE 5 MG/1
5 TABLET ORAL EVERY 8 HOURS PRN
Qty: 15 TABLET | Refills: 0 | Status: SHIPPED | OUTPATIENT
Start: 2023-01-13 | End: 2023-01-18

## 2023-01-24 ENCOUNTER — TELEPHONE (OUTPATIENT)
Dept: ORTHOPEDIC SURGERY | Age: 48
End: 2023-01-24

## 2023-01-24 DIAGNOSIS — M18.0 ARTHRITIS OF CARPOMETACARPAL (CMC) JOINT OF BOTH THUMBS: Primary | ICD-10-CM

## 2023-01-24 RX ORDER — OXYCODONE HYDROCHLORIDE 5 MG/1
5 TABLET ORAL EVERY 6 HOURS PRN
Qty: 16 TABLET | Refills: 0 | Status: SHIPPED | OUTPATIENT
Start: 2023-01-24 | End: 2023-01-28

## 2023-01-25 ENCOUNTER — OFFICE VISIT (OUTPATIENT)
Dept: ORTHOPEDIC SURGERY | Age: 48
End: 2023-01-25

## 2023-01-25 VITALS
WEIGHT: 179 LBS | OXYGEN SATURATION: 98 % | HEART RATE: 76 BPM | HEIGHT: 69 IN | BODY MASS INDEX: 26.51 KG/M2 | TEMPERATURE: 98.3 F

## 2023-01-25 DIAGNOSIS — M18.12 ARTHRITIS OF CARPOMETACARPAL (CMC) JOINT OF LEFT THUMB: Primary | ICD-10-CM

## 2023-01-25 RX ORDER — OXYCODONE HYDROCHLORIDE 5 MG/1
5 TABLET ORAL EVERY 6 HOURS PRN
Qty: 20 TABLET | Refills: 0 | Status: SHIPPED | OUTPATIENT
Start: 2023-01-25 | End: 2023-01-30

## 2023-01-25 RX ORDER — COVID-19 ANTIGEN TEST
220 KIT MISCELLANEOUS 2 TIMES DAILY PRN
Qty: 60 CAPSULE | Refills: 1 | Status: SHIPPED | OUTPATIENT
Start: 2023-01-25 | End: 2023-02-24

## 2023-01-25 NOTE — PROGRESS NOTES
[facility]      Subjective:      Chief Complaint   Patient presents with    Post-Op Check     Left [facility] Joint Arthoplasty with Palmaris Longus Tendon Interposition Transfer       HPI: Rui Kitchen is a 49 y.o. male who is 2 weeks postop CMC arthroplasty at the left hand. Splint was removed. Sutures are removed. 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    ARM SURGERY Left 1/11/2023    LEFT CMC (CARPOMETACARPAL) JOINT ARTHROPLASTY WITH PALMARIS LONGUS TENDON INTERPOSITION, TRANSFER performed by Osvaldo Ragsdale MD at [address] [place] [address] History     Socioeconomic History    Marital status:      Spouse name: Not on file    Number of children: Not on file    Years of education: Not on file    Highest education level: Not on file   Occupational History    Not on file   Tobacco Use    Smoking status: Every Day     Packs/day: 0.50     Types: Cigarettes    Smokeless tobacco: Never   Vaping Use    Vaping Use: Never used   Substance and Sexual Activity    Alcohol use: Yes     Comment: Socially    Drug use: Yes     Types: Cocaine, Marijuana (Weed)     Comment: Last used cocaine 4/25/22    Sexual activity: Not Currently   Other Topics Concern    Not on file   Social History Narrative    Not on file     Social Determinants of Health     Financial Resource Strain: Not on file   Food Insecurity: Not on file   Transportation Needs: Not on file   Physical Activity: Not on file   Stress: Not on file   Social Connections: Not on file   Intimate Partner Violence: Not on file   Housing Stability: Not on file     No family history on file.   No Known Allergies  Current Outpatient Medications on File Prior to Visit Medication Sig Dispense Refill    oxyCODONE (ROXICODONE) 5 MG immediate release tablet Take 1 tablet by mouth every 6 hours as needed for Pain for up to 4 days. Intended supply: 7 days. Take lowest dose possible to manage pain Max Daily Amount: 20 mg 16 tablet 0    aspirin 81 MG chewable tablet Take 1 tablet by mouth daily 30 tablet 0    pantoprazole (PROTONIX) 40 MG tablet Take 1 tablet by mouth 2 times daily (before meals) 60 tablet 0     No current facility-administered medications on file prior to visit. Objective:   Pulse 76   Temp 98.3 °F (36.8 °C) (Temporal)   Ht 5' 9\" (1.753 m)   Wt 179 lb (81.2 kg)   SpO2 98%   BMI 26.43 kg/m²       Radiographs and Laboratory Studies:   Previous diagnostic imaging studies were reviewed. Laboratory Studies:   Lab Results   Component Value Date    WBC 9.7 01/06/2023    HGB 17.2 01/06/2023    HCT 49.9 01/06/2023    MCV 91.7 01/06/2023     01/06/2023     No results found for: SEDRATE  No results found for: CRP    Assessment and Plan:      Diagnosis Orders   1. Arthritis of carpometacarpal (CMC) joint of both thumbs            2 weeks after left CMC arthroplasty by Dr. Chris Robles. His wounds are healing nicely without any signs of infection. His splint was removed, sutures removed and he was placed into a thumb spica cast with slight flexion of the CMC joint. He will wear this for the next 3 weeks, see him back at time for removal and initiate therapy. I will order therapy now so we can make an appointment shortly after we see him next. Refilled his [address] today, no more refills after this point. Also gave him prescription for Aleve. No orders of the defined types were placed in this encounter. No orders of the defined types were placed in this encounter. No follow-ups on file.     Delaney Freeman PA-C  [facility]  [phone]

## 2023-02-07 ENCOUNTER — TELEPHONE (OUTPATIENT)
Dept: ORTHOPEDIC SURGERY | Age: 48
End: 2023-02-07

## 2023-02-07 DIAGNOSIS — M18.12 ARTHRITIS OF CARPOMETACARPAL (CMC) JOINT OF LEFT THUMB: Primary | ICD-10-CM

## 2023-02-08 RX ORDER — OXYCODONE HYDROCHLORIDE 5 MG/1
5 TABLET ORAL EVERY 6 HOURS PRN
Qty: 20 TABLET | Refills: 0 | Status: SHIPPED | OUTPATIENT
Start: 2023-02-08 | End: 2023-02-13

## 2023-02-22 DIAGNOSIS — M18.12 ARTHRITIS OF CARPOMETACARPAL (CMC) JOINT OF LEFT THUMB: ICD-10-CM

## 2023-02-23 ENCOUNTER — OFFICE VISIT (OUTPATIENT)
Dept: ORTHOPEDIC SURGERY | Age: 48
End: 2023-02-23
Payer: COMMERCIAL

## 2023-02-23 ENCOUNTER — HOSPITAL ENCOUNTER (OUTPATIENT)
Dept: ORTHOPEDIC SURGERY | Age: 48
Discharge: HOME | End: 2023-02-25
Payer: COMMERCIAL

## 2023-02-23 DIAGNOSIS — M18.12 ARTHRITIS OF CARPOMETACARPAL (CMC) JOINT OF LEFT THUMB: ICD-10-CM

## 2023-02-23 DIAGNOSIS — M18.12 ARTHRITIS OF CARPOMETACARPAL (CMC) JOINT OF LEFT THUMB: Primary | ICD-10-CM

## 2023-02-23 PROCEDURE — 73120 X-RAY EXAM OF HAND: CPT

## 2023-02-23 PROCEDURE — 99024 POSTOP FOLLOW-UP VISIT: CPT | Performed by: PHYSICIAN ASSISTANT

## 2023-02-23 PROCEDURE — L3923 HFO WITHOUT JOINTS PRE CST: HCPCS | Performed by: PHYSICIAN ASSISTANT

## 2023-02-23 NOTE — PROGRESS NOTES
[facility]      Subjective:      Chief Complaint   Patient presents with    Follow-up         HPI: Jacki Coronel is a 49 y.o. male who is 6 weeks after [facility] arthroplasty of the left hand. Cast was removed today. Still announces that he is in some pain. Past Medical History:   Diagnosis Date    Arthritis of carpometacarpal [place]) joint of both thumbs 10/26/2022    Cerebrovascular accident (CVA) ([address]     Hepatitis C     Hyperlipidemia 12/23/2017    Smoker       Past Surgical History:   Procedure Laterality Date    ABDOMEN SURGERY      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    ARM SURGERY Left 1/11/2023    LEFT CMC (CARPOMETACARPAL) JOINT ARTHROPLASTY WITH PALMARIS LONGUS TENDON INTERPOSITION, TRANSFER performed by Ibeth Collado MD at [address] [place] [address] History     Socioeconomic History    Marital status:      Spouse name: Not on file    Number of children: Not on file    Years of education: Not on file    Highest education level: Not on file   Occupational History    Not on file   Tobacco Use    Smoking status: Every Day     Packs/day: 0.50     Types: Cigarettes    Smokeless tobacco: Never   Vaping Use    Vaping Use: Never used   Substance and Sexual Activity    Alcohol use: Yes     Comment: Socially    Drug use: Yes     Types: Cocaine, Marijuana (Weed)     Comment: Last used cocaine 4/25/22    Sexual activity: Not Currently   Other Topics Concern    Not on file   Social History Narrative    Not on file     Social Determinants of Health     Financial Resource Strain: Not on file   Food Insecurity: Not on file   Transportation Needs: Not on file   Physical Activity: Not on file   Stress: Not on file   Social Connections: Not on file   Intimate Partner Violence: Not on file   Housing Stability: Not on file     No family history on file.   No Known Allergies  Current Outpatient Medications on File Prior to Visit   Medication Sig Dispense Refill    Naproxen Sodium (ALEVE) 220 MG CAPS Take 220 mg by mouth 2 times daily as needed for Pain 60 capsule 1    pantoprazole (PROTONIX) 40 MG tablet Take 1 tablet by mouth 2 times daily (before meals) 60 tablet 0    aspirin 81 MG chewable tablet Take 1 tablet by mouth daily 30 tablet 0     No current facility-administered medications on file prior to visit. Objective: There were no vitals taken for this visit. Radiographs and Laboratory Studies:   Previous diagnostic imaging studies were reviewed. Laboratory Studies:   Lab Results   Component Value Date    WBC 9.7 01/06/2023    HGB 17.2 01/06/2023    HCT 49.9 01/06/2023    MCV 91.7 01/06/2023     01/06/2023     No results found for: SEDRATE  No results found for: CRP    Assessment and Plan:      Diagnosis Orders   1. Arthritis of carpometacarpal (CMC) joint of left thumb          6 weeks after [facility] arthroplasty. Cast was removed today. We will place him in a removable splint that he can take off as soon as his comfort level allows. We will initiate occupational therapy for his residual stiffness. We will see him back in 4 to 6 weeks to see how he is progressing. No orders of the defined types were placed in this encounter. No orders of the defined types were placed in this encounter. No follow-ups on file.     Wilfrid Soriano PA-C  [facility]  [phone]

## 2023-02-24 RX ORDER — NAPROXEN SODIUM 220 MG
TABLET ORAL
Qty: 60 TABLET | Refills: 1 | Status: SHIPPED | OUTPATIENT
Start: 2023-02-24

## 2023-03-18 DIAGNOSIS — M18.12 ARTHRITIS OF CARPOMETACARPAL (CMC) JOINT OF LEFT THUMB: ICD-10-CM

## 2023-03-20 ENCOUNTER — HOSPITAL ENCOUNTER (OUTPATIENT)
Dept: OCCUPATIONAL THERAPY | Age: 48
Setting detail: THERAPIES SERIES
Discharge: HOME | End: 2023-03-20
Payer: COMMERCIAL

## 2023-03-20 PROCEDURE — 97166 OT EVAL MOD COMPLEX 45 MIN: CPT

## 2023-03-20 NOTE — PROGRESS NOTES
Techniques , Modalities, Ultrasound, Fluidotherapy, NMES, Hot packs, Ice packs, Pain Management, PROM/Stretching/AAROM/AROM, Sensory Integration/Processing , Splinting , Strengthening/Graded Therapeutic Activity            Pt. and/or family actively involved in establishing Plan of Care and Goals: Yes    OT Treatment Completed:  N/A - Evaluation Only    OTHER RECOMMENDATIONS: None, evaluation only. GOALS         Long Term Goals  Time Frame for Long Term Goals : 6 Weeks  Long Term Goal 1: Pt to have  an dpinch strength assessed when pain/hypersensitivity permits. LTG Goal 1 Status[de-identified] New  Long Term Goal 2: Pt to report pain no greater than 4/10 with normal daily activities for improved functional activity tolerance. LTG Goal 2 Status[de-identified] New  Long Term Goal 3: Pt to demo improved 9-hole peg test time to at least 25 seconds for improved fine motor coordination/dexterity. LTG Goal 3 Status[de-identified] New  Long Term Goal 4: Pt to demo improved left thumb RODRIGUEZ to at least 75 deg for improved functional grasp. LTG Goal 4 Status[de-identified] New  Long Term Goal 5: Pt to rpeort improved UEFI score to no worse than 60/80 to indicate improved overall function.   LTG Goal 5 Status[de-identified] New    Electronically signed by Bobby Freitas OT  on 3/20/2023 at 1:15 PM    Falls Risk Assessment    Age: 0-59 = 0          60-69= 1            > 70= 2 History of Falls:   0  Falls  last 6 mo = 0    1  fall  Last  6 mo = 1   1-3 falls last 6 mo = 2 Medical History:   Parkinsons, CVA,HTN, vertigo, >4 meds, use of assistive device (1pt.for each)  Mental Status:  A & O x 3 = 0  Disoriented to person, place, or time = 2     Date: 3/20/2023                                                  Age:   0                                                     Falls: 0                                                          PMH: 0                                                          Mental: 0                                                       Total:  0

## 2023-03-21 RX ORDER — NAPROXEN SODIUM 220 MG
TABLET ORAL
Qty: 60 TABLET | Refills: 1 | Status: SHIPPED | OUTPATIENT
Start: 2023-03-21

## 2023-03-27 ENCOUNTER — HOSPITAL ENCOUNTER (OUTPATIENT)
Dept: OCCUPATIONAL THERAPY | Age: 48
Setting detail: THERAPIES SERIES
Discharge: HOME | End: 2023-03-27
Payer: COMMERCIAL

## 2023-03-27 PROCEDURE — 97530 THERAPEUTIC ACTIVITIES: CPT

## 2023-03-27 PROCEDURE — L3913 HFO W/O JOINTS CF: HCPCS

## 2023-03-27 PROCEDURE — 97140 MANUAL THERAPY 1/> REGIONS: CPT

## 2023-03-27 PROCEDURE — 97760 ORTHOTIC MGMT&TRAING 1ST ENC: CPT

## 2023-03-27 NOTE — PROGRESS NOTES
[facility]     Occupational Therapy: Daily Note   Patient: Harjinder Bain (71 y.o. male)   Date:   Plan of Care Certification Period:      :  1975  MRN: [id number]  CSN: [id number]   Insurance: Payor: Chinedu Grady / Plan: Marco Oconnor / Product Type: *No Product type* /   Insurance ID: [id number] - (Medicaid Managed) Secondary Insurance (if applicable):    Referring Physician: Stefan Bunch MD     PCP: Karlos Beasley Visits to Date: Total # of Visits to Date: eval + 1  Progress note:   Visits Approved:  (Evaluation only)    No Show:    Cancelled Appts:      Medical Diagnosis: Unspecified injury of left wrist, hand and finger(s), initial encounter [W68.42CJ]          Therapy Time      Time in 0845   Time out 0945   Total treatment minutes 60   Total time code minutes           OT Manual therapy 10 minutes for 1 unit(s), CPT 82710  OT Splint/orthosis fit and training 30 minutes for 2 unit(s), CPT 44541   OT Therapeutic activities 20 minutes for 1 unit(s), CPT 24961       SUBJECTIVE EXAMINATION     Patient's date of birth confirmed: Yes     Pain Level:   8/10    Patient Comments:   \"I want to get that brace today. \"         Learning/Language barrier: no,        HEP/Strategies/Orthosis Compliance: Patient verbally confirmed complaint with orthosis schedule. Pt exhibiting hypersensitivity over left hand CMC joint. Pt at times actively pulling hand back. Restrictions: N/A           OBJECTIVE EXAMINATION         TREATMENT     Focus of treatment was on the following:   decreasing fascial/soft tissue restrictions, decreasing pain, fine motor/dexterity, and orthotic/splint/brace fabrication          Therapeutic Activities:  (CPT 67426) Treatment Reasoning     Pt completed towel rolling on table top. Pt completed picking up pennies from table top individually with left hand and placing in slotted container.   Decreased left hand function                  Manual

## 2023-05-13 DIAGNOSIS — M18.12 ARTHRITIS OF CARPOMETACARPAL (CMC) JOINT OF LEFT THUMB: ICD-10-CM

## 2023-05-19 RX ORDER — NAPROXEN SODIUM 220 MG
TABLET ORAL
Qty: 60 TABLET | Refills: 1 | Status: SHIPPED | OUTPATIENT
Start: 2023-05-19

## 2023-07-15 DIAGNOSIS — M18.12 ARTHRITIS OF CARPOMETACARPAL (CMC) JOINT OF LEFT THUMB: ICD-10-CM

## 2023-07-17 RX ORDER — NAPROXEN SODIUM 220 MG
TABLET ORAL
Qty: 60 TABLET | Refills: 1 | OUTPATIENT
Start: 2023-07-17

## 2023-08-02 DIAGNOSIS — E78.5 HYPERLIPIDEMIA, UNSPECIFIED HYPERLIPIDEMIA TYPE: Primary | ICD-10-CM

## 2023-09-09 DIAGNOSIS — M18.12 ARTHRITIS OF CARPOMETACARPAL (CMC) JOINT OF LEFT THUMB: ICD-10-CM

## 2023-09-12 RX ORDER — NAPROXEN SODIUM 220 MG
TABLET ORAL
Qty: 60 TABLET | Refills: 1 | Status: SHIPPED | OUTPATIENT
Start: 2023-09-12

## 2023-09-14 DIAGNOSIS — I69.30 CHRONIC ISCHEMIC VERTEBROBASILAR ARTERY BRAINSTEM STROKE: ICD-10-CM

## 2023-09-14 DIAGNOSIS — I65.02 VERTEBRAL ARTERY STENOSIS, LEFT: ICD-10-CM

## 2023-09-14 DIAGNOSIS — R29.90 STROKE-LIKE SYMPTOMS: ICD-10-CM

## 2023-09-14 DIAGNOSIS — I10 ESSENTIAL HYPERTENSION: Primary | ICD-10-CM

## 2023-10-05 ENCOUNTER — OFFICE VISIT (OUTPATIENT)
Dept: CARDIOLOGY CLINIC | Age: 48
End: 2023-10-05

## 2023-10-05 VITALS
BODY MASS INDEX: 28.05 KG/M2 | DIASTOLIC BLOOD PRESSURE: 80 MMHG | HEIGHT: 69 IN | OXYGEN SATURATION: 95 % | SYSTOLIC BLOOD PRESSURE: 128 MMHG | HEART RATE: 80 BPM | WEIGHT: 189.4 LBS

## 2023-10-05 DIAGNOSIS — I10 ESSENTIAL HYPERTENSION: ICD-10-CM

## 2023-10-05 DIAGNOSIS — Z86.73 HX OF TIA (TRANSIENT ISCHEMIC ATTACK) AND STROKE: ICD-10-CM

## 2023-10-05 DIAGNOSIS — E78.5 DYSLIPIDEMIA: ICD-10-CM

## 2023-10-05 DIAGNOSIS — R09.89 BILATERAL CAROTID BRUITS: ICD-10-CM

## 2023-10-05 DIAGNOSIS — Z00.00 PE (PHYSICAL EXAM), ANNUAL: Primary | ICD-10-CM

## 2023-10-05 RX ORDER — ATORVASTATIN CALCIUM 20 MG/1
20 TABLET, FILM COATED ORAL DAILY
COMMUNITY

## 2023-10-05 RX ORDER — ERGOCALCIFEROL 1.25 MG/1
CAPSULE ORAL
COMMUNITY
Start: 2023-08-11

## 2023-10-05 RX ORDER — ATORVASTATIN CALCIUM 40 MG/1
TABLET, FILM COATED ORAL
COMMUNITY
Start: 2023-09-09

## 2023-10-05 RX ORDER — ARIPIPRAZOLE 5 MG/1
TABLET ORAL
COMMUNITY
Start: 2023-09-19

## 2023-10-05 ASSESSMENT — ENCOUNTER SYMPTOMS
CHEST TIGHTNESS: 0
RESPIRATORY NEGATIVE: 1
COUGH: 0
SHORTNESS OF BREATH: 0
NAUSEA: 0
WHEEZING: 0
EYES NEGATIVE: 1
STRIDOR: 0
BLOOD IN STOOL: 0
GASTROINTESTINAL NEGATIVE: 1

## 2023-10-05 NOTE — PROGRESS NOTES
04/26/2022 10:00 AM    ALKPHOS 105 04/26/2022 10:00 AM    AST 29 04/26/2022 10:00 AM    ALT 42 04/26/2022 10:00 AM     BMP:    Lab Results   Component Value Date/Time     01/06/2023 03:57 PM    K 4.5 01/06/2023 03:57 PM     01/06/2023 03:57 PM    CO2 29 01/06/2023 03:57 PM    BUN 18 01/06/2023 03:57 PM    LABALBU 5.3 04/26/2022 10:00 AM    CREATININE 1.01 01/06/2023 03:57 PM    CALCIUM 9.6 01/06/2023 03:57 PM    GFRAA >60 04/26/2022 10:15 AM    LABGLOM >60.0 01/06/2023 03:57 PM    GLUCOSE 88 01/06/2023 03:57 PM     Magnesium:    Lab Results   Component Value Date/Time    MG 2.2 04/26/2022 10:00 AM     TSH:  Lab Results   Component Value Date    TSH 1.10 05/15/2018             Patient Active Problem List   Diagnosis    Stroke-like symptoms    Marijuana abuse    Hx of TIA (transient ischemic attack) and stroke    Other hyperlipidemia    Chronic ischemic vertebrobasilar artery brainstem stroke    Vertebrobasilar artery insufficiency    Left-sided weakness    Vertebral artery stenosis, left    Arthritis of carpometacarpal (CMC) joint of both thumbs       There are no discontinued medications. Modified Medications    No medications on file       No orders of the defined types were placed in this encounter. Assessment/Plan:    1. Essential hypertension   Stable continue meds. Low salt diet     2. Dyslipidemia  Statin continue. Low fat diet. Need Labs. 3. Hx of TIA (transient ischemic attack) and stroke     4. Bilateral carotid bruits   Will continue surveillance. Counseling:  Heart Healthy Lifestyle, Stop Smoking, Low Salt Diet, Take Precautions to Prevent Falls, and Walk Daily    Return in about 6 months (around 4/5/2024).       Electronically signed by Samina Cortes MD on 10/5/2023 at 1:28 PM

## 2023-10-11 ENCOUNTER — TELEPHONE (OUTPATIENT)
Dept: CARDIOLOGY CLINIC | Age: 48
End: 2023-10-11

## 2023-10-11 RX ORDER — ATORVASTATIN CALCIUM 80 MG/1
80 TABLET, FILM COATED ORAL NIGHTLY
COMMUNITY
End: 2023-10-11 | Stop reason: SDUPTHER

## 2023-10-11 RX ORDER — ATORVASTATIN CALCIUM 80 MG/1
80 TABLET, FILM COATED ORAL NIGHTLY
Qty: 30 TABLET | Refills: 5 | Status: SHIPPED | OUTPATIENT
Start: 2023-10-11

## 2023-10-11 NOTE — TELEPHONE ENCOUNTER
PER DR AGUILA TELEPHONE MESSAGE 10/11/23 INCREASE LIPITOR TO 80 MG Q HS. PATIENT NEEDS NEW RX SENT TO [facility]    Requesting medication refill. Please approve or deny this request.    Rx requested:  Requested Prescriptions     Pending Prescriptions Disp Refills    atorvastatin (LIPITOR) 80 MG tablet 30 tablet 5     Sig: Take 1 tablet by mouth at bedtime         Last Office Visit:   10/5/2023      Next Visit Date:  Future Appointments   Date Time Provider [address] [place] [address]   10/24/2023  9:30 AM Raz Rowe MD [address] [place] [address]   4/8/2024 12:45 PM Rich Aguila MD [facility]                Please approve or deny.

## 2023-10-11 NOTE — TELEPHONE ENCOUNTER
PATIENT AWARE AND WILL NEED RX SENT TO [facility]

## 2023-10-22 ENCOUNTER — HOSPITAL ENCOUNTER (EMERGENCY)
Age: 48
Discharge: HOME | End: 2023-10-22
Payer: COMMERCIAL

## 2023-10-22 ENCOUNTER — APPOINTMENT (OUTPATIENT)
Dept: GENERAL RADIOLOGY | Age: 48
End: 2023-10-22
Payer: COMMERCIAL

## 2023-10-22 VITALS
DIASTOLIC BLOOD PRESSURE: 80 MMHG | HEART RATE: 89 BPM | WEIGHT: 189 LBS | SYSTOLIC BLOOD PRESSURE: 125 MMHG | RESPIRATION RATE: 16 BRPM | BODY MASS INDEX: 27.99 KG/M2 | HEIGHT: 69 IN | TEMPERATURE: 97.6 F | OXYGEN SATURATION: 98 %

## 2023-10-22 DIAGNOSIS — R20.2 PARESTHESIA: Primary | ICD-10-CM

## 2023-10-22 PROCEDURE — 73090 X-RAY EXAM OF FOREARM: CPT

## 2023-10-22 PROCEDURE — 99283 EMERGENCY DEPT VISIT LOW MDM: CPT

## 2023-10-22 PROCEDURE — 73130 X-RAY EXAM OF HAND: CPT

## 2023-10-22 RX ORDER — PREDNISONE 10 MG/1
50 TABLET ORAL DAILY
Qty: 25 TABLET | Refills: 0 | Status: SHIPPED | OUTPATIENT
Start: 2023-10-22 | End: 2023-10-27

## 2023-10-22 ASSESSMENT — PATIENT HEALTH QUESTIONNAIRE - PHQ9
1. LITTLE INTEREST OR PLEASURE IN DOING THINGS: 0
2. FEELING DOWN, DEPRESSED OR HOPELESS: 0
SUM OF ALL RESPONSES TO PHQ QUESTIONS 1-9: 0
SUM OF ALL RESPONSES TO PHQ9 QUESTIONS 1 & 2: 0
SUM OF ALL RESPONSES TO PHQ QUESTIONS 1-9: 0

## 2023-10-22 ASSESSMENT — LIFESTYLE VARIABLES
HOW OFTEN DO YOU HAVE A DRINK CONTAINING ALCOHOL: NEVER
HOW MANY STANDARD DRINKS CONTAINING ALCOHOL DO YOU HAVE ON A TYPICAL DAY: PATIENT DOES NOT DRINK

## 2023-10-22 ASSESSMENT — PAIN - FUNCTIONAL ASSESSMENT: PAIN_FUNCTIONAL_ASSESSMENT: NONE - DENIES PAIN

## 2023-10-24 ENCOUNTER — OFFICE VISIT (OUTPATIENT)
Dept: INFECTIOUS DISEASES | Age: 48
End: 2023-10-24
Payer: COMMERCIAL

## 2023-10-24 VITALS
BODY MASS INDEX: 28.29 KG/M2 | WEIGHT: 191 LBS | HEART RATE: 78 BPM | TEMPERATURE: 98.2 F | HEIGHT: 69 IN | DIASTOLIC BLOOD PRESSURE: 88 MMHG | SYSTOLIC BLOOD PRESSURE: 127 MMHG | OXYGEN SATURATION: 97 %

## 2023-10-24 DIAGNOSIS — F10.10 ALCOHOL ABUSE: ICD-10-CM

## 2023-10-24 DIAGNOSIS — F19.11 DRUG ABUSE IN REMISSION (HCC): ICD-10-CM

## 2023-10-24 DIAGNOSIS — B18.2 HEP C W/O COMA, CHRONIC (HCC): Primary | ICD-10-CM

## 2023-10-24 PROCEDURE — 99204 OFFICE O/P NEW MOD 45 MIN: CPT | Performed by: INTERNAL MEDICINE

## 2023-10-24 PROCEDURE — G8427 DOCREV CUR MEDS BY ELIG CLIN: HCPCS | Performed by: INTERNAL MEDICINE

## 2023-10-24 PROCEDURE — G8419 CALC BMI OUT NRM PARAM NOF/U: HCPCS | Performed by: INTERNAL MEDICINE

## 2023-10-24 PROCEDURE — 4004F PT TOBACCO SCREEN RCVD TLK: CPT | Performed by: INTERNAL MEDICINE

## 2023-10-24 PROCEDURE — G8484 FLU IMMUNIZE NO ADMIN: HCPCS | Performed by: INTERNAL MEDICINE

## 2023-10-24 ASSESSMENT — ENCOUNTER SYMPTOMS: ABDOMINAL DISTENTION: 1

## 2023-10-24 ASSESSMENT — PATIENT HEALTH QUESTIONNAIRE - PHQ9
SUM OF ALL RESPONSES TO PHQ QUESTIONS 1-9: 0
SUM OF ALL RESPONSES TO PHQ9 QUESTIONS 1 & 2: 0
1. LITTLE INTEREST OR PLEASURE IN DOING THINGS: 0
2. FEELING DOWN, DEPRESSED OR HOPELESS: 0
SUM OF ALL RESPONSES TO PHQ QUESTIONS 1-9: 0

## 2023-10-24 NOTE — PROGRESS NOTES
Abi Duenas (:  1975) is a 51 y.o. male,New patient, here for evaluation of the following chief complaint(s):  No chief complaint on file. ASSESSMENT/PLAN:  Chronic active hepatitis C  Drug abuse, in remission for 4 months  Alcohol abuse, cutting down    A lengthy discussion was done with pt about Hep C illness, mode of transmission, treatment and side effects   Advised to stay away from drugs and alcohol  Stop cigarette smoking  CBC complete metabolic profile hep C viral load with reflex genotype  Hep B surface antigen and antibody  HIV screen  PT/INR  FibroScan  Liver ultrasound   Epclusa 1 tablet once daily for 12 weeks after obtaining results from above work-up  Overemphasize compliance with medication  CBC complete metabolic profile hep C viral load 4 weeks after starting medication  Follow-up 6 weeks after starting medication    Subjective   SUBJECTIVE/OBJECTIVE:  HPI  Referred by [facility] for hepatitis C infection. Patient reports that he was diagnosed with years ago. He drinks 2 beers every other day. Last used crack cocaine was 4 months ago. 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 Cut down on smoking, currently smokes 1/3 pack daily  Had increased abdominal distention noted over the past 4 weeks.   Positive chronic fatigue and muscle aches  Past Medical History:   Diagnosis Date    Arthritis of carpometacarpal [place]) joint of both thumbs 10/26/2022    Cerebrovascular accident (CVA) ([address])     Hepatitis C     Hyperlipidemia 2017    Smoker      Past Surgical History:   Procedure Laterality Date    ABDOMEN SURGERY      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    ARM SURGERY Left 2023    LEFT CMC (CARPOMETACARPAL) JOINT ARTHROPLASTY WITH PALMARIS LONGUS TENDON INTERPOSITION, TRANSFER performed by Sandi Alexis MD at [address] [place] [address] History     Socioeconomic History    Marital status:      Spouse name: Not on file    Number of

## 2023-10-26 ENCOUNTER — ANCILLARY PROCEDURE (OUTPATIENT)
Dept: ENDOSCOPY | Age: 48
End: 2023-10-26
Payer: COMMERCIAL

## 2023-10-26 DIAGNOSIS — B18.2 HEP C W/O COMA, CHRONIC (HCC): ICD-10-CM

## 2023-10-26 PROCEDURE — 91200 LIVER ELASTOGRAPHY: CPT

## 2023-10-27 ENCOUNTER — TELEPHONE (OUTPATIENT)
Dept: INFECTIOUS DISEASES | Age: 48
End: 2023-10-27

## 2023-10-27 NOTE — TELEPHONE ENCOUNTER
Patient had questions about being able to be treated with Antifungal meds after he completed his Hep-C treatment. He is asking for documentation of this advice. Patient is seeing a Podiatrist, \"Samina Singh\". Also if Ok to take various vaccines, ie; shingles, pneumonia, flu ? Patient completed the [facility] with GI Dept, on this day-10/26/23. Will update ID Physician. Pt just seen as of 10/24/23.  (Dr Kala Chua)

## 2023-10-31 NOTE — PROGRESS NOTES
Patient Name: Luis Millan : 1975        Date: 2023      Type of Appt: Consult    Reason for appt: C: PER PATIENT, VERBAL FROM [facility], NO STUDIES. LEFT ARM GOING NUMB. Referred by: ELIZABETH HARTLEY    Studies done: 10/22/2023 - XR HAND LEFT @ [facility]  10/22/2023 - XR RADIUS ULNA LEFT @ [facility]    Smoking: Yes                      [facility] [address]  Ne[facility] [address] [place] Mendez, [address] [place] [address], [address] [place] [address] [place]: [phone]  F: [phone]          Patient:  Luis Millan  YOB: 1975  Date: 2023    The patient is a 51 y.o. male who presents today for evaluation of the following problems:     Chief Complaint   Patient presents with    Consultation    Arm Pain     left        Referred by No ref. provider found      PAST MEDICAL, FAMILY AND SOCIAL HISTORY:  Past Medical History:   Diagnosis Date    Arthritis of carpometacarpal [place]) joint of both thumbs 10/26/2022    Cerebrovascular accident (CVA) ([address] [facility])     Hepatitis C     Hyperlipidemia 2017    Smoker      Past Surgical History:   Procedure Laterality Date    ABDOMEN SURGERY      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    ARM SURGERY Left 2023    LEFT CMC (CARPOMETACARPAL) JOINT ARTHROPLASTY WITH PALMARIS LONGUS TENDON INTERPOSITION, TRANSFER performed by Monica Sharp MD at [address] [place] [address]     No family history on file.   Current Outpatient Medications on File Prior to Visit   Medication Sig Dispense Refill    atorvastatin (LIPITOR) 80 MG tablet Take 1 tablet by mouth at bedtime 30 tablet 5    ARIPiprazole (ABILIFY) 5 MG tablet  (Patient not taking: Reported on 10/5/2023)      vitamin D (ERGOCALCIFEROL) 1.25 MG (55777 UT) CAPS capsule  (Patient not taking: Reported on 10/5/2023)      naproxen sodium (ANAPROX) 220 MG tablet TAKE 1 TABLET BY MOUTH 2 TIMES DAILY AS NEEDED FOR PAIN (Patient not taking: Reported on 10/5/2023) 60 tablet 1    pantoprazole (PROTONIX) 40 MG tablet Take 1 tablet by mouth 2 times daily

## 2023-11-04 DIAGNOSIS — M18.12 ARTHRITIS OF CARPOMETACARPAL (CMC) JOINT OF LEFT THUMB: ICD-10-CM

## 2023-11-07 ENCOUNTER — INITIAL CONSULT (OUTPATIENT)
Dept: NEUROSURGERY | Age: 48
End: 2023-11-07

## 2023-11-07 VITALS — WEIGHT: 189 LBS | BODY MASS INDEX: 27.99 KG/M2 | TEMPERATURE: 96.8 F | RESPIRATION RATE: 18 BRPM | HEIGHT: 69 IN

## 2023-11-07 DIAGNOSIS — G62.9 NEUROPATHY: Primary | ICD-10-CM

## 2023-11-07 DIAGNOSIS — M18.12 ARTHRITIS OF CARPOMETACARPAL (CMC) JOINT OF LEFT THUMB: ICD-10-CM

## 2023-11-07 RX ORDER — NAPROXEN SODIUM 220 MG
TABLET ORAL
Qty: 60 TABLET | Refills: 1 | OUTPATIENT
Start: 2023-11-07

## 2023-11-07 ASSESSMENT — ENCOUNTER SYMPTOMS
EYE PAIN: 0
SHORTNESS OF BREATH: 0
NAUSEA: 0
BACK PAIN: 0

## 2023-11-08 ENCOUNTER — HOSPITAL ENCOUNTER (OUTPATIENT)
Dept: ULTRASOUND IMAGING | Age: 48
Discharge: HOME | End: 2023-11-10
Attending: INTERNAL MEDICINE
Payer: COMMERCIAL

## 2023-11-08 DIAGNOSIS — B18.2 HEP C W/O COMA, CHRONIC (HCC): ICD-10-CM

## 2023-11-08 PROCEDURE — 76705 ECHO EXAM OF ABDOMEN: CPT

## 2023-11-10 DIAGNOSIS — B18.2 HEP C W/O COMA, CHRONIC (HCC): ICD-10-CM

## 2023-11-10 LAB
ALBUMIN SERPL-MCNC: 4.7 G/DL (ref 3.5–4.6)
ALP SERPL-CCNC: 105 U/L (ref 35–104)
ALT SERPL-CCNC: 52 U/L (ref 0–41)
AMPHET UR QL SCN: NORMAL
ANION GAP SERPL CALCULATED.3IONS-SCNC: 12 MEQ/L (ref 9–15)
AST SERPL-CCNC: 30 U/L (ref 0–40)
BARBITURATES UR QL SCN: NORMAL
BENZODIAZ UR QL SCN: NORMAL
BILIRUB SERPL-MCNC: 0.4 MG/DL (ref 0.2–0.7)
BUN SERPL-MCNC: 9 MG/DL (ref 6–20)
CALCIUM SERPL-MCNC: 9.4 MG/DL (ref 8.5–9.9)
CANNABINOIDS UR QL SCN: NORMAL
CHLORIDE SERPL-SCNC: 105 MEQ/L (ref 95–107)
CO2 SERPL-SCNC: 25 MEQ/L (ref 20–31)
COCAINE UR QL SCN: NORMAL
CREAT SERPL-MCNC: 0.86 MG/DL (ref 0.7–1.2)
DRUG SCREEN COMMENT UR-IMP: NORMAL
ERYTHROCYTE [DISTWIDTH] IN BLOOD BY AUTOMATED COUNT: 12.3 % (ref 11.5–14.5)
FENTANYL SCREEN, URINE: NORMAL
GLOBULIN SER CALC-MCNC: 3.3 G/DL (ref 2.3–3.5)
GLUCOSE SERPL-MCNC: 98 MG/DL (ref 70–99)
HBV SURFACE AG SERPL QL IA: NORMAL
HCT VFR BLD AUTO: 50 % (ref 42–52)
HGB BLD-MCNC: 16.7 G/DL (ref 14–18)
INR PPP: 1.1
MCH RBC QN AUTO: 30.5 PG (ref 27–31.3)
MCHC RBC AUTO-ENTMCNC: 33.4 % (ref 33–37)
MCV RBC AUTO: 91.4 FL (ref 79–92.2)
METHADONE UR QL SCN: NORMAL
OPIATES UR QL SCN: NORMAL
OXYCODONE UR QL SCN: NORMAL
PCP UR QL SCN: NORMAL
PLATELET # BLD AUTO: 246 K/UL (ref 130–400)
POTASSIUM SERPL-SCNC: 4.4 MEQ/L (ref 3.4–4.9)
PROPOXYPH UR QL SCN: NORMAL
PROT SERPL-MCNC: 8 G/DL (ref 6.3–8)
PROTHROMBIN TIME: 13.8 SEC (ref 12.3–14.9)
RBC # BLD AUTO: 5.47 M/UL (ref 4.7–6.1)
SODIUM SERPL-SCNC: 142 MEQ/L (ref 135–144)
WBC # BLD AUTO: 7.8 K/UL (ref 4.8–10.8)

## 2023-11-12 LAB
HCV RNA SERPL NAA+PROBE-ACNC: ABNORMAL IU/ML
HCV RNA SERPL NAA+PROBE-LOG IU: 6.12 LOG IU/ML
HCV RNA SERPL QL NAA+PROBE: DETECTED

## 2023-11-15 LAB
HBV SURFACE AB TITR SER: 742.8 MIU/ML
HCV GENTYP SERPL NAA+PROBE: NORMAL

## 2023-12-15 ENCOUNTER — HOSPITAL ENCOUNTER (OUTPATIENT)
Dept: NEUROLOGY | Age: 48
Discharge: HOME | End: 2023-12-15

## 2023-12-15 DIAGNOSIS — G62.9 NEUROPATHY: ICD-10-CM

## 2023-12-15 DIAGNOSIS — G62.9 NEUROPATHY: Primary | ICD-10-CM

## 2024-01-19 ENCOUNTER — TELEPHONE (OUTPATIENT)
Dept: INFECTIOUS DISEASES | Age: 49
End: 2024-01-19

## 2024-01-19 NOTE — TELEPHONE ENCOUNTER
Patient calls with [facility] medication delivery. He states he called for the next set of \"4-wk\" of meds as directed. When delivered, he Only Received 2-weeks worth. Patient very concerned and does not want to run out.  Call placed to [facility].  Shoutly Rep checked the order for Mr Leon, and confirms an order was sent as of 1/12/24. She states she will send an \"Actoin Alert\" to the pharmacist and request a return call to the patient.    Return call to patient, advised he should receive a call from the pharmacy. If he does not hear from them by Monday, to please return a call to us.

## 2024-01-25 ENCOUNTER — TELEPHONE (OUTPATIENT)
Dept: INFECTIOUS DISEASES | Age: 49
End: 2024-01-25

## 2024-01-25 NOTE — TELEPHONE ENCOUNTER
Ralph call from Mr. Leon. He states [facility] only sent 14 days of medication instead of 28 days.   Patient explained that the outer box from [facility] was sealed and not damaged.   However, he said when he opened the box, the sofosbuvir package already had a slit on the side and 1 blister pack was completely missing.Him and his wife are adamant the he only received 1 blister pack of 14 pills, instead of 2 blister packs of 28 total.  Consulted the pharmacist  \" Ta\" at [facility] via email. Waiting for guidance and direction for Mr. Leon. Patient informed at [phone]

## 2024-01-26 ENCOUNTER — TELEPHONE (OUTPATIENT)
Dept: INFECTIOUS DISEASES | Age: 49
End: 2024-01-26

## 2024-01-26 NOTE — TELEPHONE ENCOUNTER
Recd email notification stating: [facility] shipped out 14 additional/replacement pills last night.   UPS [id number]  Patient informed via voice mail at [phone] to return call to ID clinic.  Shipment should arrive 24 or 2024

## 2024-02-02 DIAGNOSIS — B18.2 HEP C W/O COMA, CHRONIC (HCC): Primary | ICD-10-CM

## 2024-02-02 DIAGNOSIS — F19.11 DRUG ABUSE IN REMISSION (HCC): ICD-10-CM

## 2024-02-02 DIAGNOSIS — B18.2 HEP C W/O COMA, CHRONIC (HCC): ICD-10-CM

## 2024-02-02 LAB
ALBUMIN SERPL-MCNC: 4.5 G/DL (ref 3.5–4.6)
ALP SERPL-CCNC: 73 U/L (ref 35–104)
ALT SERPL-CCNC: 18 U/L (ref 0–41)
ANION GAP SERPL CALCULATED.3IONS-SCNC: 14 MEQ/L (ref 9–15)
AST SERPL-CCNC: 14 U/L (ref 0–40)
BILIRUB SERPL-MCNC: <0.2 MG/DL (ref 0.2–0.7)
BUN SERPL-MCNC: 11 MG/DL (ref 6–20)
CALCIUM SERPL-MCNC: 9.6 MG/DL (ref 8.5–9.9)
CHLORIDE SERPL-SCNC: 105 MEQ/L (ref 95–107)
CO2 SERPL-SCNC: 25 MEQ/L (ref 20–31)
CREAT SERPL-MCNC: 1 MG/DL (ref 0.7–1.2)
ERYTHROCYTE [DISTWIDTH] IN BLOOD BY AUTOMATED COUNT: 11.7 % (ref 11.5–14.5)
GLOBULIN SER CALC-MCNC: 3.6 G/DL (ref 2.3–3.5)
GLUCOSE SERPL-MCNC: 97 MG/DL (ref 70–99)
HCT VFR BLD AUTO: 49.6 % (ref 42–52)
HGB BLD-MCNC: 16.5 G/DL (ref 14–18)
MCH RBC QN AUTO: 29.9 PG (ref 27–31.3)
MCHC RBC AUTO-ENTMCNC: 33.3 % (ref 33–37)
MCV RBC AUTO: 89.9 FL (ref 79–92.2)
PLATELET # BLD AUTO: 302 K/UL (ref 130–400)
POTASSIUM SERPL-SCNC: 4.7 MEQ/L (ref 3.4–4.9)
PROT SERPL-MCNC: 8.1 G/DL (ref 6.3–8)
RBC # BLD AUTO: 5.52 M/UL (ref 4.7–6.1)
SODIUM SERPL-SCNC: 144 MEQ/L (ref 135–144)
WBC # BLD AUTO: 6.2 K/UL (ref 4.8–10.8)

## 2024-02-04 LAB
HCV RNA SERPL NAA+PROBE-ACNC: NOT DETECTED IU/ML
HCV RNA SERPL NAA+PROBE-LOG IU: NOT DETECTED LOG IU/ML
HCV RNA SERPL QL NAA+PROBE: NOT DETECTED

## 2024-02-19 ENCOUNTER — OFFICE VISIT (OUTPATIENT)
Dept: INFECTIOUS DISEASES | Age: 49
End: 2024-02-19
Payer: COMMERCIAL

## 2024-02-19 VITALS
HEIGHT: 69 IN | RESPIRATION RATE: 20 BRPM | TEMPERATURE: 98.6 F | BODY MASS INDEX: 27.85 KG/M2 | OXYGEN SATURATION: 96 % | WEIGHT: 188 LBS | DIASTOLIC BLOOD PRESSURE: 68 MMHG | SYSTOLIC BLOOD PRESSURE: 132 MMHG | HEART RATE: 90 BPM

## 2024-02-19 DIAGNOSIS — B18.2 HEP C W/O COMA, CHRONIC (HCC): Primary | ICD-10-CM

## 2024-02-19 DIAGNOSIS — F19.11 DRUG ABUSE IN REMISSION (HCC): ICD-10-CM

## 2024-02-19 PROCEDURE — G8419 CALC BMI OUT NRM PARAM NOF/U: HCPCS | Performed by: INTERNAL MEDICINE

## 2024-02-19 PROCEDURE — 99214 OFFICE O/P EST MOD 30 MIN: CPT | Performed by: INTERNAL MEDICINE

## 2024-02-19 PROCEDURE — G8484 FLU IMMUNIZE NO ADMIN: HCPCS | Performed by: INTERNAL MEDICINE

## 2024-02-19 PROCEDURE — 4004F PT TOBACCO SCREEN RCVD TLK: CPT | Performed by: INTERNAL MEDICINE

## 2024-02-19 PROCEDURE — G8428 CUR MEDS NOT DOCUMENT: HCPCS | Performed by: INTERNAL MEDICINE

## 2024-02-19 ASSESSMENT — PATIENT HEALTH QUESTIONNAIRE - PHQ9
2. FEELING DOWN, DEPRESSED OR HOPELESS: 0
1. LITTLE INTEREST OR PLEASURE IN DOING THINGS: 0
SUM OF ALL RESPONSES TO PHQ QUESTIONS 1-9: 0
SUM OF ALL RESPONSES TO PHQ QUESTIONS 1-9: 0
SUM OF ALL RESPONSES TO PHQ9 QUESTIONS 1 & 2: 0
SUM OF ALL RESPONSES TO PHQ QUESTIONS 1-9: 0
SUM OF ALL RESPONSES TO PHQ QUESTIONS 1-9: 0

## 2024-03-24 RX ORDER — ATORVASTATIN CALCIUM 80 MG/1
80 TABLET, FILM COATED ORAL NIGHTLY
Qty: 90 TABLET | Refills: 3 | Status: SHIPPED | OUTPATIENT
Start: 2024-03-24

## 2024-05-04 ENCOUNTER — HOSPITAL ENCOUNTER (EMERGENCY)
Age: 49
Discharge: HOME | End: 2024-05-04
Payer: COMMERCIAL

## 2024-05-04 ENCOUNTER — APPOINTMENT (OUTPATIENT)
Dept: GENERAL RADIOLOGY | Age: 49
End: 2024-05-04
Payer: COMMERCIAL

## 2024-05-04 VITALS
WEIGHT: 183 LBS | OXYGEN SATURATION: 98 % | BODY MASS INDEX: 27.11 KG/M2 | TEMPERATURE: 97.6 F | HEIGHT: 69 IN | SYSTOLIC BLOOD PRESSURE: 110 MMHG | DIASTOLIC BLOOD PRESSURE: 70 MMHG | HEART RATE: 91 BPM | RESPIRATION RATE: 18 BRPM

## 2024-05-04 DIAGNOSIS — G89.18 ACUTE POST-OPERATIVE PAIN: Primary | ICD-10-CM

## 2024-05-04 PROCEDURE — 73130 X-RAY EXAM OF HAND: CPT

## 2024-05-04 PROCEDURE — 6360000002 HC RX W HCPCS

## 2024-05-04 PROCEDURE — 96372 THER/PROPH/DIAG INJ SC/IM: CPT

## 2024-05-04 PROCEDURE — 6370000000 HC RX 637 (ALT 250 FOR IP)

## 2024-05-04 PROCEDURE — 99284 EMERGENCY DEPT VISIT MOD MDM: CPT

## 2024-05-04 RX ORDER — OXYCODONE HYDROCHLORIDE 5 MG/1
5 TABLET ORAL ONCE
Status: COMPLETED | OUTPATIENT
Start: 2024-05-04 | End: 2024-05-04

## 2024-05-04 RX ORDER — KETOROLAC TROMETHAMINE 30 MG/ML
30 INJECTION, SOLUTION INTRAMUSCULAR; INTRAVENOUS ONCE
Status: COMPLETED | OUTPATIENT
Start: 2024-05-04 | End: 2024-05-04

## 2024-05-04 RX ORDER — MORPHINE SULFATE 4 MG/ML
4 INJECTION, SOLUTION INTRAMUSCULAR; INTRAVENOUS ONCE
Status: COMPLETED | OUTPATIENT
Start: 2024-05-04 | End: 2024-05-04

## 2024-05-04 RX ADMIN — OXYCODONE 5 MG: 5 TABLET ORAL at 20:16

## 2024-05-04 RX ADMIN — MORPHINE SULFATE 4 MG: 4 INJECTION, SOLUTION INTRAMUSCULAR; INTRAVENOUS at 18:44

## 2024-05-04 RX ADMIN — KETOROLAC TROMETHAMINE 30 MG: 30 INJECTION INTRAMUSCULAR; INTRAVENOUS at 20:16

## 2024-05-04 ASSESSMENT — ENCOUNTER SYMPTOMS
COUGH: 0
VOMITING: 0
DIARRHEA: 0
NAUSEA: 0
SHORTNESS OF BREATH: 0
ABDOMINAL PAIN: 0
PHOTOPHOBIA: 0

## 2024-05-04 ASSESSMENT — PAIN DESCRIPTION - ORIENTATION: ORIENTATION: LEFT

## 2024-05-04 ASSESSMENT — PAIN DESCRIPTION - PAIN TYPE: TYPE: ACUTE PAIN

## 2024-05-04 ASSESSMENT — LIFESTYLE VARIABLES
HOW MANY STANDARD DRINKS CONTAINING ALCOHOL DO YOU HAVE ON A TYPICAL DAY: PATIENT DOES NOT DRINK
HOW OFTEN DO YOU HAVE A DRINK CONTAINING ALCOHOL: NEVER

## 2024-05-04 ASSESSMENT — PAIN DESCRIPTION - LOCATION
LOCATION: HAND
LOCATION: ARM

## 2024-05-04 ASSESSMENT — PAIN SCALES - GENERAL
PAINLEVEL_OUTOF10: 10
PAINLEVEL_OUTOF10: 10

## 2024-05-04 ASSESSMENT — PAIN DESCRIPTION - FREQUENCY: FREQUENCY: INTERMITTENT

## 2024-05-04 ASSESSMENT — PAIN DESCRIPTION - ONSET: ONSET: ON-GOING

## 2024-05-04 ASSESSMENT — PAIN DESCRIPTION - DESCRIPTORS: DESCRIPTORS: ACHING

## 2024-05-04 ASSESSMENT — PAIN - FUNCTIONAL ASSESSMENT
PAIN_FUNCTIONAL_ASSESSMENT: NONE - DENIES PAIN
PAIN_FUNCTIONAL_ASSESSMENT: 0-10

## 2024-05-04 NOTE — ED PROVIDER NOTES
[facility] ED  EMERGENCY DEPARTMENT ENCOUNTER      Pt Name: Monico Leon  MRN: [id number]  Birthdate 1975  Date of evaluation: 5/4/2024  Provider: DICKSON Saucedo  6:05 PM EDT      CHIEF COMPLAINT       Chief Complaint   Patient presents with    Post-op Problem     Left hand pain and swelling X2 days following operation hand surgery          HISTORY OF PRESENT ILLNESS   (Location/Symptom, Timing/Onset, Context/Setting, Quality, Duration, Modifying Factors, Severity)  Note limiting factors.   Monico Leon is a 49 y.o. male who presents to the emergency department with PMHx alcohol abuse, generalized anxiety disorder, major depressive disorder, shoulder disorder, tobacco use, cocaine use, hyperlipidemia, chronic pain syndrome, continuous opiate use, viral hepatitis C, CVA.  Status post arthroplasty left carpometacarpal joints 5/2/2024.  Patient presents to the emergency department for evaluation of left hand pain after surgery.  Patient states he has had pain since surgery.  Patient had surgery on 5/2 at the [facility] for left carpometacarpal joint arthroplasty.  Patient has been in contact with his surgeon via telephone, they increased his Percocet dose as well as sent over indomethacin which he has not yet picked up.  Patient states that the pain is persistent.  Patient denies any numbness, cold sensation, warmth, significant swelling to the extremity.  Patient has intact movement of all left fingers, intact  strength.  No fevers, chills, nausea, vomiting, or systemic complaints.    HPI    Nursing Notes were reviewed.    REVIEW OF SYSTEMS    (2-9 systems for level 4, 10 or more for level 5)     Review of Systems   Constitutional:  Negative for chills and fever.   HENT:  Negative for congestion.    Eyes:  Negative for photophobia.   Respiratory:  Negative for cough and shortness of breath.    Cardiovascular:  Negative for chest pain.   Gastrointestinal:  Negative for abdominal pain,

## 2024-09-17 ENCOUNTER — APPOINTMENT (OUTPATIENT)
Dept: GENERAL RADIOLOGY | Age: 49
End: 2024-09-17
Payer: COMMERCIAL

## 2024-09-17 ENCOUNTER — APPOINTMENT (OUTPATIENT)
Dept: CT IMAGING | Age: 49
End: 2024-09-17
Payer: COMMERCIAL

## 2024-09-17 ENCOUNTER — APPOINTMENT (OUTPATIENT)
Dept: MRI IMAGING | Age: 49
End: 2024-09-17
Payer: COMMERCIAL

## 2024-09-17 ENCOUNTER — APPOINTMENT (OUTPATIENT)
Dept: ULTRASOUND IMAGING | Age: 49
End: 2024-09-17
Payer: COMMERCIAL

## 2024-09-17 ENCOUNTER — HOSPITAL ENCOUNTER (INPATIENT)
Age: 49
LOS: 3 days | Discharge: HOME | End: 2024-09-20
Attending: INTERNAL MEDICINE | Admitting: INTERNAL MEDICINE
Payer: COMMERCIAL

## 2024-09-17 ENCOUNTER — APPOINTMENT (OUTPATIENT)
Age: 49
End: 2024-09-17
Attending: PSYCHIATRY & NEUROLOGY
Payer: COMMERCIAL

## 2024-09-17 DIAGNOSIS — I63.81 CEREBROVASCULAR ACCIDENT (CVA) DUE TO STENOSIS OF SMALL ARTERY (HCC): ICD-10-CM

## 2024-09-17 DIAGNOSIS — I63.9 CEREBROVASCULAR ACCIDENT (CVA), UNSPECIFIED MECHANISM (HCC): ICD-10-CM

## 2024-09-17 DIAGNOSIS — R20.2 PARESTHESIA OF LEFT UPPER AND LOWER EXTREMITY: Primary | ICD-10-CM

## 2024-09-17 DIAGNOSIS — R29.90 STROKE-LIKE SYMPTOM: ICD-10-CM

## 2024-09-17 LAB
ALBUMIN SERPL-MCNC: 4.5 G/DL (ref 3.5–4.6)
ALP SERPL-CCNC: 78 U/L (ref 35–104)
ALT SERPL-CCNC: 12 U/L (ref 0–41)
AMPHET UR QL SCN: ABNORMAL
ANION GAP SERPL CALCULATED.3IONS-SCNC: 14 MEQ/L (ref 9–15)
AST SERPL-CCNC: 16 U/L (ref 0–40)
BARBITURATES UR QL SCN: ABNORMAL
BASOPHILS # BLD: 0.1 K/UL (ref 0–0.2)
BASOPHILS NFR BLD: 0.9 %
BENZODIAZ UR QL SCN: ABNORMAL
BILIRUB SERPL-MCNC: <0.2 MG/DL (ref 0.2–0.7)
BILIRUB UR QL STRIP: NEGATIVE
BNP BLD-MCNC: 111 PG/ML
BUN SERPL-MCNC: 11 MG/DL (ref 6–20)
CALCIUM SERPL-MCNC: 9.2 MG/DL (ref 8.5–9.9)
CANNABINOIDS UR QL SCN: ABNORMAL
CHLORIDE SERPL-SCNC: 103 MEQ/L (ref 95–107)
CHOLEST SERPL-MCNC: 234 MG/DL (ref 0–199)
CLARITY UR: CLEAR
CO2 SERPL-SCNC: 25 MEQ/L (ref 20–31)
COCAINE UR QL SCN: POSITIVE
COLOR UR: YELLOW
CREAT SERPL-MCNC: 0.82 MG/DL (ref 0.7–1.2)
DRUG SCREEN COMMENT UR-IMP: ABNORMAL
ECHO AO ROOT DIAM: 3.2 CM
ECHO AO ROOT INDEX: 1.62 CM/M2
ECHO AV AREA PEAK VELOCITY: 2 CM2
ECHO AV AREA VTI: 2 CM2
ECHO AV AREA/BSA PEAK VELOCITY: 1 CM2/M2
ECHO AV AREA/BSA VTI: 1 CM2/M2
ECHO AV CUSP MM: 2 CM
ECHO AV MEAN GRADIENT: 3 MMHG
ECHO AV MEAN VELOCITY: 0.8 M/S
ECHO AV PEAK GRADIENT: 6 MMHG
ECHO AV PEAK VELOCITY: 1.2 M/S
ECHO AV VELOCITY RATIO: 0.92
ECHO AV VTI: 26.4 CM
ECHO BSA: 1.99 M2
ECHO LA DIAMETER INDEX: 1.26 CM/M2
ECHO LA DIAMETER: 2.5 CM
ECHO LA TO AORTIC ROOT RATIO: 0.78
ECHO LA VOL A-L A2C: 48 ML (ref 18–58)
ECHO LA VOL A-L A4C: 43 ML (ref 18–58)
ECHO LA VOL MOD A2C: 45 ML (ref 18–58)
ECHO LA VOL MOD A4C: 37 ML (ref 18–58)
ECHO LA VOLUME AREA LENGTH: 48 ML
ECHO LA VOLUME INDEX A-L A2C: 24 ML/M2 (ref 16–34)
ECHO LA VOLUME INDEX A-L A4C: 22 ML/M2 (ref 16–34)
ECHO LA VOLUME INDEX AREA LENGTH: 24 ML/M2 (ref 16–34)
ECHO LA VOLUME INDEX MOD A2C: 23 ML/M2 (ref 16–34)
ECHO LA VOLUME INDEX MOD A4C: 19 ML/M2 (ref 16–34)
ECHO LV E' LATERAL VELOCITY: 12 CM/S
ECHO LV E' SEPTAL VELOCITY: 11 CM/S
ECHO LV EDV A2C: 68 ML
ECHO LV EDV A4C: 88 ML
ECHO LV EDV BP: 77 ML (ref 67–155)
ECHO LV EDV INDEX A4C: 44 ML/M2
ECHO LV EDV INDEX BP: 39 ML/M2
ECHO LV EDV NDEX A2C: 34 ML/M2
ECHO LV EJECTION FRACTION A2C: 48 %
ECHO LV EJECTION FRACTION A4C: 57 %
ECHO LV EJECTION FRACTION BIPLANE: 50 % (ref 55–100)
ECHO LV ESV A2C: 35 ML
ECHO LV ESV A4C: 38 ML
ECHO LV ESV BP: 39 ML (ref 22–58)
ECHO LV ESV INDEX A2C: 18 ML/M2
ECHO LV ESV INDEX A4C: 19 ML/M2
ECHO LV ESV INDEX BP: 20 ML/M2
ECHO LV FRACTIONAL SHORTENING: 36 % (ref 28–44)
ECHO LV INTERNAL DIMENSION DIASTOLE INDEX: 2.22 CM/M2
ECHO LV INTERNAL DIMENSION DIASTOLIC: 4.4 CM (ref 4.2–5.9)
ECHO LV INTERNAL DIMENSION SYSTOLIC INDEX: 1.41 CM/M2
ECHO LV INTERNAL DIMENSION SYSTOLIC: 2.8 CM
ECHO LV IVSD: 0.9 CM (ref 0.6–1)
ECHO LV IVSS: 1.3 CM
ECHO LV MASS 2D: 168.9 G (ref 88–224)
ECHO LV MASS INDEX 2D: 85.3 G/M2 (ref 49–115)
ECHO LV POSTERIOR WALL DIASTOLIC: 1.3 CM (ref 0.6–1)
ECHO LV POSTERIOR WALL SYSTOLIC: 1.6 CM
ECHO LV RELATIVE WALL THICKNESS RATIO: 0.59
ECHO LVOT AREA: 2 CM2
ECHO LVOT AV VTI INDEX: 0.94
ECHO LVOT DIAM: 1.6 CM
ECHO LVOT MEAN GRADIENT: 3 MMHG
ECHO LVOT PEAK GRADIENT: 5 MMHG
ECHO LVOT PEAK VELOCITY: 1.1 M/S
ECHO LVOT STROKE VOLUME INDEX: 25.2 ML/M2
ECHO LVOT SV: 49.8 ML
ECHO LVOT VTI: 24.8 CM
ECHO MV A VELOCITY: 0.69 M/S
ECHO MV AREA VTI: 2 CM2
ECHO MV E DECELERATION TIME (DT): 159.7 MS
ECHO MV E VELOCITY: 0.85 M/S
ECHO MV E/A RATIO: 1.23
ECHO MV E/E' LATERAL: 7.08
ECHO MV E/E' RATIO (AVERAGED): 7.41
ECHO MV E/E' SEPTAL: 7.73
ECHO MV LVOT VTI INDEX: 1.02
ECHO MV MAX VELOCITY: 0.9 M/S
ECHO MV MEAN GRADIENT: 1 MMHG
ECHO MV MEAN VELOCITY: 0.4 M/S
ECHO MV PEAK GRADIENT: 3 MMHG
ECHO MV VTI: 25.4 CM
ECHO PV MAX VELOCITY: 0.9 M/S
ECHO PV PEAK GRADIENT: 3 MMHG
ECHO RV INTERNAL DIMENSION: 2.9 CM
ECHO RV TAPSE: 2.3 CM (ref 1.7–?)
EKG ATRIAL RATE: 74 BPM
EKG P AXIS: 53 DEGREES
EKG P-R INTERVAL: 164 MS
EKG Q-T INTERVAL: 394 MS
EKG QRS DURATION: 90 MS
EKG QTC CALCULATION (BAZETT): 437 MS
EKG R AXIS: 40 DEGREES
EKG T AXIS: 46 DEGREES
EKG VENTRICULAR RATE: 74 BPM
EOSINOPHIL # BLD: 0.1 K/UL (ref 0–0.7)
EOSINOPHIL NFR BLD: 1.2 %
ERYTHROCYTE [DISTWIDTH] IN BLOOD BY AUTOMATED COUNT: 11.9 % (ref 11.5–14.5)
FENTANYL SCREEN, URINE: ABNORMAL
GLOBULIN SER CALC-MCNC: 3.2 G/DL (ref 2.3–3.5)
GLUCOSE SERPL-MCNC: 99 MG/DL (ref 70–99)
GLUCOSE UR STRIP-MCNC: NEGATIVE MG/DL
HCT VFR BLD AUTO: 47.7 % (ref 42–52)
HDLC SERPL-MCNC: 33 MG/DL (ref 40–59)
HGB BLD-MCNC: 16.2 G/DL (ref 14–18)
HGB UR QL STRIP: NEGATIVE
INFLUENZA A BY PCR: NEGATIVE
INFLUENZA B BY PCR: NEGATIVE
INR PPP: 0.9
KETONES UR STRIP-MCNC: NEGATIVE MG/DL
LDLC SERPL CALC-MCNC: ABNORMAL MG/DL (ref 0–129)
LEUKOCYTE ESTERASE UR QL STRIP: NEGATIVE
LYMPHOCYTES # BLD: 3.8 K/UL (ref 1–4.8)
LYMPHOCYTES NFR BLD: 40.3 %
MCH RBC QN AUTO: 31.1 PG (ref 27–31.3)
MCHC RBC AUTO-ENTMCNC: 34 % (ref 33–37)
MCV RBC AUTO: 91.6 FL (ref 79–92.2)
METHADONE UR QL SCN: ABNORMAL
MONOCYTES # BLD: 0.5 K/UL (ref 0.2–0.8)
MONOCYTES NFR BLD: 5.8 %
NEUTROPHILS # BLD: 4.8 K/UL (ref 1.4–6.5)
NEUTS SEG NFR BLD: 51.6 %
NITRITE UR QL STRIP: NEGATIVE
OPIATES UR QL SCN: ABNORMAL
OXYCODONE UR QL SCN: ABNORMAL
PCP UR QL SCN: ABNORMAL
PH UR STRIP: 5.5 [PH] (ref 5–9)
PLATELET # BLD AUTO: 298 K/UL (ref 130–400)
POTASSIUM SERPL-SCNC: 4 MEQ/L (ref 3.4–4.9)
PROPOXYPH UR QL SCN: ABNORMAL
PROT SERPL-MCNC: 7.7 G/DL (ref 6.3–8)
PROT UR STRIP-MCNC: NEGATIVE MG/DL
PROTHROMBIN TIME: 12.8 SEC (ref 12.3–14.9)
RBC # BLD AUTO: 5.21 M/UL (ref 4.7–6.1)
SARS-COV-2 RDRP RESP QL NAA+PROBE: NOT DETECTED
SODIUM SERPL-SCNC: 142 MEQ/L (ref 135–144)
SP GR UR STRIP: 1 (ref 1–1.03)
TRIGL SERPL-MCNC: 406 MG/DL (ref 0–150)
TROPONIN, HIGH SENSITIVITY: <6 NG/L (ref 0–19)
TROPONIN, HIGH SENSITIVITY: <6 NG/L (ref 0–19)
URINE REFLEX TO CULTURE: NORMAL
UROBILINOGEN UR STRIP-ACNC: 0.2 E.U./DL
WBC # BLD AUTO: 9.3 K/UL (ref 4.8–10.8)

## 2024-09-17 PROCEDURE — 93306 TTE W/DOPPLER COMPLETE: CPT | Performed by: INTERNAL MEDICINE

## 2024-09-17 PROCEDURE — 80307 DRUG TEST PRSMV CHEM ANLYZR: CPT

## 2024-09-17 PROCEDURE — 93306 TTE W/DOPPLER COMPLETE: CPT

## 2024-09-17 PROCEDURE — 72146 MRI CHEST SPINE W/O DYE: CPT

## 2024-09-17 PROCEDURE — 1210000000 HC MED SURG R&B

## 2024-09-17 PROCEDURE — 83036 HEMOGLOBIN GLYCOSYLATED A1C: CPT

## 2024-09-17 PROCEDURE — 70450 CT HEAD/BRAIN W/O DYE: CPT

## 2024-09-17 PROCEDURE — 97165 OT EVAL LOW COMPLEX 30 MIN: CPT

## 2024-09-17 PROCEDURE — 93005 ELECTROCARDIOGRAM TRACING: CPT | Performed by: PHYSICIAN ASSISTANT

## 2024-09-17 PROCEDURE — APPSS45 APP SPLIT SHARED TIME 31-45 MINUTES: Performed by: NURSE PRACTITIONER

## 2024-09-17 PROCEDURE — 93880 EXTRACRANIAL BILAT STUDY: CPT

## 2024-09-17 PROCEDURE — 80061 LIPID PANEL: CPT

## 2024-09-17 PROCEDURE — 36415 COLL VENOUS BLD VENIPUNCTURE: CPT

## 2024-09-17 PROCEDURE — 6370000000 HC RX 637 (ALT 250 FOR IP): Performed by: INTERNAL MEDICINE

## 2024-09-17 PROCEDURE — 85025 COMPLETE CBC W/AUTO DIFF WBC: CPT

## 2024-09-17 PROCEDURE — 71045 X-RAY EXAM CHEST 1 VIEW: CPT

## 2024-09-17 PROCEDURE — 87635 SARS-COV-2 COVID-19 AMP PRB: CPT

## 2024-09-17 PROCEDURE — 84484 ASSAY OF TROPONIN QUANT: CPT

## 2024-09-17 PROCEDURE — 70551 MRI BRAIN STEM W/O DYE: CPT

## 2024-09-17 PROCEDURE — 2580000003 HC RX 258: Performed by: INTERNAL MEDICINE

## 2024-09-17 PROCEDURE — 83880 ASSAY OF NATRIURETIC PEPTIDE: CPT

## 2024-09-17 PROCEDURE — 97162 PT EVAL MOD COMPLEX 30 MIN: CPT

## 2024-09-17 PROCEDURE — 99254 IP/OBS CNSLTJ NEW/EST MOD 60: CPT | Performed by: PSYCHIATRY & NEUROLOGY

## 2024-09-17 PROCEDURE — 99285 EMERGENCY DEPT VISIT HI MDM: CPT

## 2024-09-17 PROCEDURE — 80053 COMPREHEN METABOLIC PANEL: CPT

## 2024-09-17 PROCEDURE — 6360000002 HC RX W HCPCS: Performed by: INTERNAL MEDICINE

## 2024-09-17 PROCEDURE — 87502 INFLUENZA DNA AMP PROBE: CPT

## 2024-09-17 PROCEDURE — 81003 URINALYSIS AUTO W/O SCOPE: CPT

## 2024-09-17 PROCEDURE — 85610 PROTHROMBIN TIME: CPT

## 2024-09-17 RX ORDER — ATORVASTATIN CALCIUM 80 MG/1
80 TABLET, FILM COATED ORAL NIGHTLY
Status: DISCONTINUED | OUTPATIENT
Start: 2024-09-17 | End: 2024-09-20 | Stop reason: HOSPADM

## 2024-09-17 RX ORDER — GABAPENTIN 400 MG/1
300 CAPSULE ORAL 4 TIMES DAILY
COMMUNITY

## 2024-09-17 RX ORDER — SODIUM CHLORIDE 0.9 % (FLUSH) 0.9 %
5-40 SYRINGE (ML) INJECTION PRN
Status: DISCONTINUED | OUTPATIENT
Start: 2024-09-17 | End: 2024-09-20 | Stop reason: HOSPADM

## 2024-09-17 RX ORDER — ONDANSETRON 2 MG/ML
4 INJECTION INTRAMUSCULAR; INTRAVENOUS EVERY 6 HOURS PRN
Status: DISCONTINUED | OUTPATIENT
Start: 2024-09-17 | End: 2024-09-20 | Stop reason: HOSPADM

## 2024-09-17 RX ORDER — ENOXAPARIN SODIUM 100 MG/ML
40 INJECTION SUBCUTANEOUS DAILY
Status: DISCONTINUED | OUTPATIENT
Start: 2024-09-17 | End: 2024-09-20 | Stop reason: HOSPADM

## 2024-09-17 RX ORDER — ACETAMINOPHEN 650 MG/1
650 SUPPOSITORY RECTAL EVERY 6 HOURS PRN
Status: DISCONTINUED | OUTPATIENT
Start: 2024-09-17 | End: 2024-09-20 | Stop reason: HOSPADM

## 2024-09-17 RX ORDER — POTASSIUM CHLORIDE 7.45 MG/ML
10 INJECTION INTRAVENOUS PRN
Status: DISCONTINUED | OUTPATIENT
Start: 2024-09-17 | End: 2024-09-20 | Stop reason: HOSPADM

## 2024-09-17 RX ORDER — POTASSIUM CHLORIDE 1500 MG/1
40 TABLET, EXTENDED RELEASE ORAL PRN
Status: DISCONTINUED | OUTPATIENT
Start: 2024-09-17 | End: 2024-09-20 | Stop reason: HOSPADM

## 2024-09-17 RX ORDER — ASPIRIN 81 MG/1
81 TABLET, CHEWABLE ORAL DAILY
Status: DISCONTINUED | OUTPATIENT
Start: 2024-09-17 | End: 2024-09-20 | Stop reason: HOSPADM

## 2024-09-17 RX ORDER — SODIUM CHLORIDE 0.9 % (FLUSH) 0.9 %
5-40 SYRINGE (ML) INJECTION EVERY 12 HOURS SCHEDULED
Status: DISCONTINUED | OUTPATIENT
Start: 2024-09-17 | End: 2024-09-20 | Stop reason: HOSPADM

## 2024-09-17 RX ORDER — ACETAMINOPHEN 325 MG/1
650 TABLET ORAL EVERY 6 HOURS PRN
Status: DISCONTINUED | OUTPATIENT
Start: 2024-09-17 | End: 2024-09-20 | Stop reason: HOSPADM

## 2024-09-17 RX ORDER — ONDANSETRON 4 MG/1
4 TABLET, ORALLY DISINTEGRATING ORAL EVERY 8 HOURS PRN
Status: DISCONTINUED | OUTPATIENT
Start: 2024-09-17 | End: 2024-09-20 | Stop reason: HOSPADM

## 2024-09-17 RX ORDER — SODIUM CHLORIDE 9 MG/ML
INJECTION, SOLUTION INTRAVENOUS PRN
Status: DISCONTINUED | OUTPATIENT
Start: 2024-09-17 | End: 2024-09-20 | Stop reason: HOSPADM

## 2024-09-17 RX ORDER — POLYETHYLENE GLYCOL 3350 17 G/17G
17 POWDER, FOR SOLUTION ORAL DAILY PRN
Status: DISCONTINUED | OUTPATIENT
Start: 2024-09-17 | End: 2024-09-20 | Stop reason: HOSPADM

## 2024-09-17 RX ORDER — LANOLIN ALCOHOL/MO/W.PET/CERES
3 CREAM (GRAM) TOPICAL NIGHTLY PRN
Status: DISCONTINUED | OUTPATIENT
Start: 2024-09-17 | End: 2024-09-20 | Stop reason: HOSPADM

## 2024-09-17 RX ORDER — MAGNESIUM SULFATE IN WATER 40 MG/ML
2000 INJECTION, SOLUTION INTRAVENOUS PRN
Status: DISCONTINUED | OUTPATIENT
Start: 2024-09-17 | End: 2024-09-20 | Stop reason: HOSPADM

## 2024-09-17 RX ADMIN — ATORVASTATIN CALCIUM 80 MG: 80 TABLET, FILM COATED ORAL at 21:08

## 2024-09-17 RX ADMIN — ENOXAPARIN SODIUM 40 MG: 100 INJECTION SUBCUTANEOUS at 08:27

## 2024-09-17 RX ADMIN — SODIUM CHLORIDE, PRESERVATIVE FREE 10 ML: 5 INJECTION INTRAVENOUS at 08:28

## 2024-09-17 RX ADMIN — ASPIRIN 81 MG 81 MG: 81 TABLET ORAL at 08:28

## 2024-09-17 RX ADMIN — SODIUM CHLORIDE, PRESERVATIVE FREE 10 ML: 5 INJECTION INTRAVENOUS at 21:08

## 2024-09-17 ASSESSMENT — ENCOUNTER SYMPTOMS
NAUSEA: 0
COUGH: 1
CHEST TIGHTNESS: 0
SORE THROAT: 0
CONSTIPATION: 1
ABDOMINAL PAIN: 0
VOMITING: 0
WHEEZING: 0
SHORTNESS OF BREATH: 0
COUGH: 0
COLOR CHANGE: 0
TROUBLE SWALLOWING: 0

## 2024-09-17 ASSESSMENT — PAIN SCALES - GENERAL
PAINLEVEL_OUTOF10: 0

## 2024-09-17 ASSESSMENT — LIFESTYLE VARIABLES
HOW OFTEN DO YOU HAVE A DRINK CONTAINING ALCOHOL: 4 OR MORE TIMES A WEEK
HOW MANY STANDARD DRINKS CONTAINING ALCOHOL DO YOU HAVE ON A TYPICAL DAY: 5 OR 6

## 2024-09-17 ASSESSMENT — PAIN - FUNCTIONAL ASSESSMENT: PAIN_FUNCTIONAL_ASSESSMENT: 0-10

## 2024-09-18 ENCOUNTER — APPOINTMENT (OUTPATIENT)
Dept: CT IMAGING | Age: 49
End: 2024-09-18
Payer: COMMERCIAL

## 2024-09-18 LAB
ALBUMIN SERPL-MCNC: 3.7 G/DL (ref 3.5–4.6)
ALP SERPL-CCNC: 66 U/L (ref 35–104)
ALT SERPL-CCNC: 11 U/L (ref 0–41)
ANION GAP SERPL CALCULATED.3IONS-SCNC: 8 MEQ/L (ref 9–15)
AST SERPL-CCNC: 13 U/L (ref 0–40)
BASOPHILS # BLD: 0.1 K/UL (ref 0–0.2)
BASOPHILS NFR BLD: 0.8 %
BILIRUB SERPL-MCNC: 0.3 MG/DL (ref 0.2–0.7)
BUN SERPL-MCNC: 14 MG/DL (ref 6–20)
CALCIUM SERPL-MCNC: 8.7 MG/DL (ref 8.5–9.9)
CHLORIDE SERPL-SCNC: 106 MEQ/L (ref 95–107)
CO2 SERPL-SCNC: 27 MEQ/L (ref 20–31)
CREAT SERPL-MCNC: 0.95 MG/DL (ref 0.7–1.2)
CRP SERPL HS-MCNC: <3 MG/L (ref 0–5)
EOSINOPHIL # BLD: 0.3 K/UL (ref 0–0.7)
EOSINOPHIL NFR BLD: 3.4 %
ERYTHROCYTE [DISTWIDTH] IN BLOOD BY AUTOMATED COUNT: 12 % (ref 11.5–14.5)
ERYTHROCYTE [SEDIMENTATION RATE] IN BLOOD BY WESTERGREN METHOD: 7 MM (ref 0–20)
ESTIMATED AVERAGE GLUCOSE: 120 MG/DL
GLOBULIN SER CALC-MCNC: 2.7 G/DL (ref 2.3–3.5)
GLUCOSE BLD-MCNC: 95 MG/DL (ref 70–99)
GLUCOSE SERPL-MCNC: 128 MG/DL (ref 70–99)
HBA1C MFR BLD: 5.8 % (ref 4–6)
HCT VFR BLD AUTO: 46 % (ref 42–52)
HGB BLD-MCNC: 16.2 G/DL (ref 14–18)
HOMOCYSTEINE: 11.9 UMOL/L (ref 0–15)
LYMPHOCYTES # BLD: 3.3 K/UL (ref 1–4.8)
LYMPHOCYTES NFR BLD: 45.3 %
MCH RBC QN AUTO: 32 PG (ref 27–31.3)
MCHC RBC AUTO-ENTMCNC: 35.2 % (ref 33–37)
MCV RBC AUTO: 90.9 FL (ref 79–92.2)
MONOCYTES # BLD: 0.6 K/UL (ref 0.2–0.8)
MONOCYTES NFR BLD: 7.7 %
NEUTROPHILS # BLD: 3.1 K/UL (ref 1.4–6.5)
NEUTS SEG NFR BLD: 42.7 %
PERFORMED ON: NORMAL
PLATELET # BLD AUTO: 249 K/UL (ref 130–400)
POTASSIUM SERPL-SCNC: 4 MEQ/L (ref 3.4–4.9)
PROT SERPL-MCNC: 6.4 G/DL (ref 6.3–8)
RBC # BLD AUTO: 5.06 M/UL (ref 4.7–6.1)
SODIUM SERPL-SCNC: 141 MEQ/L (ref 135–144)
WBC # BLD AUTO: 7.3 K/UL (ref 4.8–10.8)

## 2024-09-18 PROCEDURE — 85652 RBC SED RATE AUTOMATED: CPT

## 2024-09-18 PROCEDURE — 86147 CARDIOLIPIN ANTIBODY EA IG: CPT

## 2024-09-18 PROCEDURE — 85613 RUSSELL VIPER VENOM DILUTED: CPT

## 2024-09-18 PROCEDURE — 80053 COMPREHEN METABOLIC PANEL: CPT

## 2024-09-18 PROCEDURE — 6370000000 HC RX 637 (ALT 250 FOR IP): Performed by: NURSE PRACTITIONER

## 2024-09-18 PROCEDURE — 99232 SBSQ HOSP IP/OBS MODERATE 35: CPT | Performed by: PSYCHIATRY & NEUROLOGY

## 2024-09-18 PROCEDURE — 86140 C-REACTIVE PROTEIN: CPT

## 2024-09-18 PROCEDURE — 85610 PROTHROMBIN TIME: CPT

## 2024-09-18 PROCEDURE — 6370000000 HC RX 637 (ALT 250 FOR IP): Performed by: INTERNAL MEDICINE

## 2024-09-18 PROCEDURE — 85305 CLOT INHIBIT PROT S TOTAL: CPT

## 2024-09-18 PROCEDURE — 99223 1ST HOSP IP/OBS HIGH 75: CPT | Performed by: INTERNAL MEDICINE

## 2024-09-18 PROCEDURE — 83090 ASSAY OF HOMOCYSTEINE: CPT

## 2024-09-18 PROCEDURE — 70498 CT ANGIOGRAPHY NECK: CPT

## 2024-09-18 PROCEDURE — 85302 CLOT INHIBIT PROT C ANTIGEN: CPT

## 2024-09-18 PROCEDURE — 2580000003 HC RX 258: Performed by: INTERNAL MEDICINE

## 2024-09-18 PROCEDURE — 85730 THROMBOPLASTIN TIME PARTIAL: CPT

## 2024-09-18 PROCEDURE — 70496 CT ANGIOGRAPHY HEAD: CPT

## 2024-09-18 PROCEDURE — 6360000002 HC RX W HCPCS: Performed by: INTERNAL MEDICINE

## 2024-09-18 PROCEDURE — 1210000000 HC MED SURG R&B

## 2024-09-18 PROCEDURE — 85025 COMPLETE CBC W/AUTO DIFF WBC: CPT

## 2024-09-18 PROCEDURE — 6360000004 HC RX CONTRAST MEDICATION: Performed by: NURSE PRACTITIONER

## 2024-09-18 PROCEDURE — 86225 DNA ANTIBODY NATIVE: CPT

## 2024-09-18 PROCEDURE — 81291 MTHFR GENE: CPT

## 2024-09-18 PROCEDURE — 81241 F5 GENE: CPT

## 2024-09-18 PROCEDURE — 36415 COLL VENOUS BLD VENIPUNCTURE: CPT

## 2024-09-18 PROCEDURE — APPSS30 APP SPLIT SHARED TIME 16-30 MINUTES: Performed by: NURSE PRACTITIONER

## 2024-09-18 RX ORDER — IOPAMIDOL 755 MG/ML
75 INJECTION, SOLUTION INTRAVASCULAR
Status: COMPLETED | OUTPATIENT
Start: 2024-09-18 | End: 2024-09-18

## 2024-09-18 RX ORDER — CLOPIDOGREL BISULFATE 75 MG/1
75 TABLET ORAL DAILY
Status: DISCONTINUED | OUTPATIENT
Start: 2024-09-18 | End: 2024-09-20 | Stop reason: HOSPADM

## 2024-09-18 RX ADMIN — IOPAMIDOL 75 ML: 755 INJECTION, SOLUTION INTRAVENOUS at 14:09

## 2024-09-18 RX ADMIN — ATORVASTATIN CALCIUM 80 MG: 80 TABLET, FILM COATED ORAL at 20:26

## 2024-09-18 RX ADMIN — SODIUM CHLORIDE, PRESERVATIVE FREE 10 ML: 5 INJECTION INTRAVENOUS at 20:27

## 2024-09-18 RX ADMIN — CLOPIDOGREL BISULFATE 75 MG: 75 TABLET ORAL at 10:21

## 2024-09-18 RX ADMIN — ENOXAPARIN SODIUM 40 MG: 100 INJECTION SUBCUTANEOUS at 08:55

## 2024-09-18 RX ADMIN — ASPIRIN 81 MG 81 MG: 81 TABLET ORAL at 08:55

## 2024-09-18 RX ADMIN — SODIUM CHLORIDE, PRESERVATIVE FREE 10 ML: 5 INJECTION INTRAVENOUS at 08:56

## 2024-09-18 ASSESSMENT — ENCOUNTER SYMPTOMS
COUGH: 0
CHEST TIGHTNESS: 0
VOMITING: 0
WHEEZING: 0
TROUBLE SWALLOWING: 0
ABDOMINAL PAIN: 0
SHORTNESS OF BREATH: 0
NAUSEA: 0
COLOR CHANGE: 0
ABDOMINAL DISTENTION: 0

## 2024-09-19 ENCOUNTER — APPOINTMENT (OUTPATIENT)
Age: 49
End: 2024-09-19
Attending: INTERNAL MEDICINE
Payer: COMMERCIAL

## 2024-09-19 ENCOUNTER — HOSPITAL ENCOUNTER (INPATIENT)
Age: 49
Discharge: HOME | End: 2024-09-21
Payer: COMMERCIAL

## 2024-09-19 VITALS
OXYGEN SATURATION: 94 % | SYSTOLIC BLOOD PRESSURE: 126 MMHG | HEART RATE: 68 BPM | RESPIRATION RATE: 17 BRPM | TEMPERATURE: 98.2 F | DIASTOLIC BLOOD PRESSURE: 71 MMHG

## 2024-09-19 DIAGNOSIS — R29.90 STROKE-LIKE SYMPTOMS: Primary | ICD-10-CM

## 2024-09-19 LAB
ALBUMIN SERPL-MCNC: 3.9 G/DL (ref 3.5–4.6)
ALP SERPL-CCNC: 68 U/L (ref 35–104)
ALT SERPL-CCNC: 13 U/L (ref 0–41)
ANION GAP SERPL CALCULATED.3IONS-SCNC: 8 MEQ/L (ref 9–15)
AST SERPL-CCNC: 13 U/L (ref 0–40)
BASOPHILS # BLD: 0.1 K/UL (ref 0–0.2)
BASOPHILS NFR BLD: 0.8 %
BILIRUB SERPL-MCNC: <0.2 MG/DL (ref 0.2–0.7)
BUN SERPL-MCNC: 15 MG/DL (ref 6–20)
CALCIUM SERPL-MCNC: 9.2 MG/DL (ref 8.5–9.9)
CHLORIDE SERPL-SCNC: 105 MEQ/L (ref 95–107)
CO2 SERPL-SCNC: 27 MEQ/L (ref 20–31)
CREAT SERPL-MCNC: 0.91 MG/DL (ref 0.7–1.2)
ECHO BSA: 1.99 M2
EOSINOPHIL # BLD: 0.2 K/UL (ref 0–0.7)
EOSINOPHIL NFR BLD: 3.2 %
ERYTHROCYTE [DISTWIDTH] IN BLOOD BY AUTOMATED COUNT: 11.9 % (ref 11.5–14.5)
GLOBULIN SER CALC-MCNC: 3 G/DL (ref 2.3–3.5)
GLUCOSE SERPL-MCNC: 106 MG/DL (ref 70–99)
HCT VFR BLD AUTO: 47.7 % (ref 42–52)
HGB BLD-MCNC: 16.3 G/DL (ref 14–18)
LYMPHOCYTES # BLD: 3.1 K/UL (ref 1–4.8)
LYMPHOCYTES NFR BLD: 42.5 %
MCH RBC QN AUTO: 31 PG (ref 27–31.3)
MCHC RBC AUTO-ENTMCNC: 34.2 % (ref 33–37)
MCV RBC AUTO: 90.7 FL (ref 79–92.2)
MONOCYTES # BLD: 0.6 K/UL (ref 0.2–0.8)
MONOCYTES NFR BLD: 8.3 %
NEUTROPHILS # BLD: 3.3 K/UL (ref 1.4–6.5)
NEUTS SEG NFR BLD: 44.9 %
PLATELET # BLD AUTO: 264 K/UL (ref 130–400)
POTASSIUM SERPL-SCNC: 4.5 MEQ/L (ref 3.4–4.9)
PROT SERPL-MCNC: 6.9 G/DL (ref 6.3–8)
RBC # BLD AUTO: 5.26 M/UL (ref 4.7–6.1)
SODIUM SERPL-SCNC: 140 MEQ/L (ref 135–144)
WBC # BLD AUTO: 7.2 K/UL (ref 4.8–10.8)

## 2024-09-19 PROCEDURE — 1210000000 HC MED SURG R&B

## 2024-09-19 PROCEDURE — B24BZZ4 ULTRASONOGRAPHY OF HEART WITH AORTA, TRANSESOPHAGEAL: ICD-10-PCS | Performed by: INTERNAL MEDICINE

## 2024-09-19 PROCEDURE — 36415 COLL VENOUS BLD VENIPUNCTURE: CPT

## 2024-09-19 PROCEDURE — 6360000002 HC RX W HCPCS: Performed by: INTERNAL MEDICINE

## 2024-09-19 PROCEDURE — 97116 GAIT TRAINING THERAPY: CPT

## 2024-09-19 PROCEDURE — 7100000010 HC PHASE II RECOVERY - FIRST 15 MIN: Performed by: INTERNAL MEDICINE

## 2024-09-19 PROCEDURE — 6370000000 HC RX 637 (ALT 250 FOR IP): Performed by: NURSE PRACTITIONER

## 2024-09-19 PROCEDURE — 85025 COMPLETE CBC W/AUTO DIFF WBC: CPT

## 2024-09-19 PROCEDURE — 80053 COMPREHEN METABOLIC PANEL: CPT

## 2024-09-19 PROCEDURE — 2709999900 HC NON-CHARGEABLE SUPPLY: Performed by: INTERNAL MEDICINE

## 2024-09-19 PROCEDURE — APPSS15 APP SPLIT SHARED TIME 0-15 MINUTES: Performed by: NURSE PRACTITIONER

## 2024-09-19 PROCEDURE — 99152 MOD SED SAME PHYS/QHP 5/>YRS: CPT | Performed by: INTERNAL MEDICINE

## 2024-09-19 PROCEDURE — 2580000003 HC RX 258: Performed by: INTERNAL MEDICINE

## 2024-09-19 PROCEDURE — 7100000011 HC PHASE II RECOVERY - ADDTL 15 MIN: Performed by: INTERNAL MEDICINE

## 2024-09-19 PROCEDURE — 6370000000 HC RX 637 (ALT 250 FOR IP): Performed by: INTERNAL MEDICINE

## 2024-09-19 PROCEDURE — 93320 DOPPLER ECHO COMPLETE: CPT

## 2024-09-19 PROCEDURE — 99232 SBSQ HOSP IP/OBS MODERATE 35: CPT | Performed by: PSYCHIATRY & NEUROLOGY

## 2024-09-19 RX ORDER — SODIUM CHLORIDE 0.9 % (FLUSH) 0.9 %
5-40 SYRINGE (ML) INJECTION PRN
Status: DISCONTINUED | OUTPATIENT
Start: 2024-09-19 | End: 2024-09-20 | Stop reason: HOSPADM

## 2024-09-19 RX ORDER — MIDAZOLAM HYDROCHLORIDE 1 MG/ML
INJECTION INTRAMUSCULAR; INTRAVENOUS PRN
Status: COMPLETED | OUTPATIENT
Start: 2024-09-19 | End: 2024-09-19

## 2024-09-19 RX ORDER — SODIUM CHLORIDE 9 MG/ML
INJECTION, SOLUTION INTRAVENOUS PRN
Status: CANCELLED | OUTPATIENT
Start: 2024-09-19

## 2024-09-19 RX ORDER — SODIUM CHLORIDE 0.9 % (FLUSH) 0.9 %
5-40 SYRINGE (ML) INJECTION PRN
Status: CANCELLED | OUTPATIENT
Start: 2024-09-19

## 2024-09-19 RX ORDER — SODIUM CHLORIDE 0.9 % (FLUSH) 0.9 %
5-40 SYRINGE (ML) INJECTION EVERY 12 HOURS SCHEDULED
Status: CANCELLED | OUTPATIENT
Start: 2024-09-19

## 2024-09-19 RX ORDER — FENTANYL CITRATE 50 UG/ML
INJECTION, SOLUTION INTRAMUSCULAR; INTRAVENOUS PRN
Status: COMPLETED | OUTPATIENT
Start: 2024-09-19 | End: 2024-09-19

## 2024-09-19 RX ORDER — SODIUM CHLORIDE 0.9 % (FLUSH) 0.9 %
5-40 SYRINGE (ML) INJECTION EVERY 12 HOURS SCHEDULED
Status: DISCONTINUED | OUTPATIENT
Start: 2024-09-19 | End: 2024-09-20 | Stop reason: HOSPADM

## 2024-09-19 RX ORDER — SODIUM CHLORIDE 9 MG/ML
INJECTION, SOLUTION INTRAVENOUS PRN
Status: DISCONTINUED | OUTPATIENT
Start: 2024-09-19 | End: 2024-09-20 | Stop reason: HOSPADM

## 2024-09-19 RX ADMIN — CLOPIDOGREL BISULFATE 75 MG: 75 TABLET ORAL at 14:59

## 2024-09-19 RX ADMIN — SODIUM CHLORIDE, PRESERVATIVE FREE 10 ML: 5 INJECTION INTRAVENOUS at 20:13

## 2024-09-19 RX ADMIN — ASPIRIN 81 MG 81 MG: 81 TABLET ORAL at 14:59

## 2024-09-19 RX ADMIN — ENOXAPARIN SODIUM 40 MG: 100 INJECTION SUBCUTANEOUS at 10:08

## 2024-09-19 RX ADMIN — SODIUM CHLORIDE, PRESERVATIVE FREE 10 ML: 5 INJECTION INTRAVENOUS at 10:08

## 2024-09-19 RX ADMIN — ATORVASTATIN CALCIUM 80 MG: 80 TABLET, FILM COATED ORAL at 20:13

## 2024-09-19 RX ADMIN — MIDAZOLAM 2 MG: 1 INJECTION, SOLUTION INTRAMUSCULAR; INTRAVENOUS at 13:04

## 2024-09-19 RX ADMIN — MIDAZOLAM 1 MG: 1 INJECTION, SOLUTION INTRAMUSCULAR; INTRAVENOUS at 13:09

## 2024-09-19 RX ADMIN — FENTANYL CITRATE 25 MCG: 50 INJECTION, SOLUTION INTRAMUSCULAR; INTRAVENOUS at 13:09

## 2024-09-19 RX ADMIN — FENTANYL CITRATE 50 MCG: 50 INJECTION, SOLUTION INTRAMUSCULAR; INTRAVENOUS at 13:04

## 2024-09-19 ASSESSMENT — ENCOUNTER SYMPTOMS
VOMITING: 0
SHORTNESS OF BREATH: 0
CHEST TIGHTNESS: 0
TROUBLE SWALLOWING: 0
COLOR CHANGE: 0
WHEEZING: 0
ABDOMINAL PAIN: 0
COUGH: 0
ABDOMINAL DISTENTION: 0
NAUSEA: 0

## 2024-09-19 ASSESSMENT — PAIN SCALES - GENERAL: PAINLEVEL_OUTOF10: 0

## 2024-09-20 VITALS
DIASTOLIC BLOOD PRESSURE: 64 MMHG | TEMPERATURE: 98.1 F | HEIGHT: 69 IN | HEART RATE: 65 BPM | OXYGEN SATURATION: 95 % | RESPIRATION RATE: 18 BRPM | SYSTOLIC BLOOD PRESSURE: 114 MMHG | BODY MASS INDEX: 26.66 KG/M2 | WEIGHT: 180 LBS

## 2024-09-20 LAB
CARDIOLIPIN IGG SER IA-ACNC: <10 GPL
CARDIOLIPIN IGM SER IA-ACNC: <10 MPL
DSDNA AB TITR SER CLIF: 4 IU (ref 0–24)
ECHO BSA: 1.99 M2
MTHFR C.1298A>C GENO BLD/T: NORMAL
MTHFR C.677C>T GENO BLD/T: NORMAL
MTHFR GENE MUT ANL BLD/T: NORMAL
PROT C AG ACT/NOR PPP IA: 82 % (ref 63–153)
PROT S AG ACT/NOR PPP IA: 91 % (ref 84–134)
SPECIMEN SOURCE: NORMAL

## 2024-09-20 PROCEDURE — 99232 SBSQ HOSP IP/OBS MODERATE 35: CPT | Performed by: INTERNAL MEDICINE

## 2024-09-20 PROCEDURE — 93312 ECHO TRANSESOPHAGEAL: CPT | Performed by: INTERNAL MEDICINE

## 2024-09-20 PROCEDURE — 93325 DOPPLER ECHO COLOR FLOW MAPG: CPT | Performed by: INTERNAL MEDICINE

## 2024-09-20 PROCEDURE — 99152 MOD SED SAME PHYS/QHP 5/>YRS: CPT | Performed by: INTERNAL MEDICINE

## 2024-09-20 PROCEDURE — 2700000000 HC OXYGEN THERAPY PER DAY

## 2024-09-20 PROCEDURE — 93320 DOPPLER ECHO COMPLETE: CPT | Performed by: INTERNAL MEDICINE

## 2024-09-20 PROCEDURE — 6360000002 HC RX W HCPCS: Performed by: INTERNAL MEDICINE

## 2024-09-20 PROCEDURE — 6370000000 HC RX 637 (ALT 250 FOR IP): Performed by: NURSE PRACTITIONER

## 2024-09-20 PROCEDURE — 99232 SBSQ HOSP IP/OBS MODERATE 35: CPT | Performed by: PSYCHIATRY & NEUROLOGY

## 2024-09-20 PROCEDURE — 6370000000 HC RX 637 (ALT 250 FOR IP): Performed by: INTERNAL MEDICINE

## 2024-09-20 RX ORDER — CLOPIDOGREL BISULFATE 75 MG/1
75 TABLET ORAL DAILY
Qty: 30 TABLET | Refills: 3 | Status: SHIPPED | OUTPATIENT
Start: 2024-09-21

## 2024-09-20 RX ORDER — ASPIRIN 81 MG/1
81 TABLET, CHEWABLE ORAL DAILY
Qty: 30 TABLET | Refills: 0 | Status: SHIPPED | OUTPATIENT
Start: 2024-09-20

## 2024-09-20 RX ADMIN — ASPIRIN 81 MG 81 MG: 81 TABLET ORAL at 09:33

## 2024-09-20 RX ADMIN — CLOPIDOGREL BISULFATE 75 MG: 75 TABLET ORAL at 09:33

## 2024-09-20 RX ADMIN — ENOXAPARIN SODIUM 40 MG: 100 INJECTION SUBCUTANEOUS at 09:33

## 2024-09-20 ASSESSMENT — ENCOUNTER SYMPTOMS
VOMITING: 0
COLOR CHANGE: 0
NAUSEA: 0
COUGH: 0
CHEST TIGHTNESS: 0
ABDOMINAL PAIN: 0
SHORTNESS OF BREATH: 0
ABDOMINAL DISTENTION: 0
WHEEZING: 0
TROUBLE SWALLOWING: 0

## 2024-09-21 LAB
APTT SCREEN TO CONFIRM RATIO: 0.91 {RATIO}
CONFIRM DRVVT STA-STACLOT: NORMAL S
DRVVT SCREEN TO CONFIRM RATIO: 0.82 {RATIO}
DRVVT SCREEN TO CONFIRM RATIO: NORMAL {RATIO}
DRVVT/DRVVT CFM P DOAC NEUT NORM PPP-RTO: NORMAL {RATIO}
HEPARIN NT PPP QL: NORMAL
LA 3 SCREEN W REFLEX-IMP: NORMAL
LMW HEPARIN IND PLT AB SER QL: NORMAL
MIXING DRVVT: NORMAL S
PROTHROMBIN TIME: 13 S (ref 12–15.5)
SCREEN APTT: NORMAL S
THROMBIN TIME: NORMAL S

## 2024-09-23 LAB — F5 P.R506Q BLD/T QL: NEGATIVE

## 2024-10-17 ENCOUNTER — HOSPITAL ENCOUNTER (EMERGENCY)
Age: 49
Discharge: HOME | End: 2024-10-18
Attending: EMERGENCY MEDICINE
Payer: COMMERCIAL

## 2024-10-17 DIAGNOSIS — R53.1 GENERAL WEAKNESS: Primary | ICD-10-CM

## 2024-10-17 LAB
BASOPHILS # BLD: 0.1 K/UL (ref 0–0.2)
BASOPHILS NFR BLD: 0.8 %
EOSINOPHIL # BLD: 0.3 K/UL (ref 0–0.7)
EOSINOPHIL NFR BLD: 2.8 %
ERYTHROCYTE [DISTWIDTH] IN BLOOD BY AUTOMATED COUNT: 11.9 % (ref 11.5–14.5)
GLUCOSE BLD-MCNC: 118 MG/DL (ref 70–99)
HCT VFR BLD AUTO: 53.2 % (ref 42–52)
HGB BLD-MCNC: 18.4 G/DL (ref 14–18)
LYMPHOCYTES # BLD: 5 K/UL (ref 1–4.8)
LYMPHOCYTES NFR BLD: 54.3 %
MCH RBC QN AUTO: 31 PG (ref 27–31.3)
MCHC RBC AUTO-ENTMCNC: 34.6 % (ref 33–37)
MCV RBC AUTO: 89.6 FL (ref 79–92.2)
MONOCYTES # BLD: 0.5 K/UL (ref 0.2–0.8)
MONOCYTES NFR BLD: 4.9 %
NEUTROPHILS # BLD: 3.4 K/UL (ref 1.4–6.5)
NEUTS SEG NFR BLD: 37 %
PERFORMED ON: ABNORMAL
PLATELET # BLD AUTO: 302 K/UL (ref 130–400)
RBC # BLD AUTO: 5.94 M/UL (ref 4.7–6.1)
WBC # BLD AUTO: 9.2 K/UL (ref 4.8–10.8)

## 2024-10-17 PROCEDURE — 99284 EMERGENCY DEPT VISIT MOD MDM: CPT

## 2024-10-17 PROCEDURE — 80053 COMPREHEN METABOLIC PANEL: CPT

## 2024-10-17 PROCEDURE — 85025 COMPLETE CBC W/AUTO DIFF WBC: CPT

## 2024-10-17 ASSESSMENT — LIFESTYLE VARIABLES
HOW MANY STANDARD DRINKS CONTAINING ALCOHOL DO YOU HAVE ON A TYPICAL DAY: 5 OR 6
HOW OFTEN DO YOU HAVE A DRINK CONTAINING ALCOHOL: 2-3 TIMES A WEEK

## 2024-10-18 ENCOUNTER — APPOINTMENT (OUTPATIENT)
Dept: CT IMAGING | Age: 49
End: 2024-10-18
Payer: COMMERCIAL

## 2024-10-18 VITALS
DIASTOLIC BLOOD PRESSURE: 69 MMHG | TEMPERATURE: 98.2 F | SYSTOLIC BLOOD PRESSURE: 120 MMHG | WEIGHT: 177 LBS | HEART RATE: 76 BPM | OXYGEN SATURATION: 98 % | BODY MASS INDEX: 26.14 KG/M2 | RESPIRATION RATE: 23 BRPM

## 2024-10-18 LAB
ALBUMIN SERPL-MCNC: 4.9 G/DL (ref 3.5–4.6)
ALP SERPL-CCNC: 97 U/L (ref 35–104)
ALT SERPL-CCNC: 18 U/L (ref 0–41)
ANION GAP SERPL CALCULATED.3IONS-SCNC: 13 MEQ/L (ref 9–15)
AST SERPL-CCNC: 18 U/L (ref 0–40)
BILIRUB SERPL-MCNC: <0.2 MG/DL (ref 0.2–0.7)
BUN SERPL-MCNC: 11 MG/DL (ref 6–20)
CALCIUM SERPL-MCNC: 9.5 MG/DL (ref 8.5–9.9)
CHLORIDE SERPL-SCNC: 102 MEQ/L (ref 95–107)
CO2 SERPL-SCNC: 26 MEQ/L (ref 20–31)
CREAT SERPL-MCNC: 0.84 MG/DL (ref 0.7–1.2)
GLOBULIN SER CALC-MCNC: 3.7 G/DL (ref 2.3–3.5)
GLUCOSE SERPL-MCNC: 108 MG/DL (ref 70–99)
POTASSIUM SERPL-SCNC: 4.1 MEQ/L (ref 3.4–4.9)
PROT SERPL-MCNC: 8.6 G/DL (ref 6.3–8)
SODIUM SERPL-SCNC: 141 MEQ/L (ref 135–144)

## 2024-10-18 PROCEDURE — 70450 CT HEAD/BRAIN W/O DYE: CPT

## 2024-10-18 NOTE — ED PROVIDER NOTES
ED BEDSIDE ULTRASOUND:   Performed by ED Physician - none    LABS:  Labs Reviewed   CBC WITH AUTO DIFFERENTIAL - Abnormal; Notable for the following components:       Result Value    Hemoglobin 18.4 (*)     Hematocrit 53.2 (*)     Lymphocytes Absolute 5.0 (*)     All other components within normal limits   COMPREHENSIVE METABOLIC PANEL - Abnormal; Notable for the following components:    Glucose 108 (*)     Total Protein 8.6 (*)     Albumin 4.9 (*)     Globulin 3.7 (*)     All other components within normal limits   POCT GLUCOSE - Abnormal; Notable for the following components:    POC Glucose 118 (*)     All other components within normal limits       All other labs were within normal range or not returned as of this dictation.    EMERGENCY DEPARTMENT COURSE and DIFFERENTIAL DIAGNOSIS/MDM:   Vitals:    Vitals:    10/17/24 2336 10/18/24 0000 10/18/24 0200   BP: (!) 170/112 120/69    Pulse: 78 77 76   Resp: 17 23    Temp: 98.2 °F (36.8 °C)     TempSrc: Oral     SpO2: 100% 98%    Weight: 80.3 kg (177 lb)          MDM  I spent an extensive amount of time reviewing the patient's medical record both of his last admission and outpatient visits and testing.  I also reviewed with the patient and his wife and they are understanding of his illness and any preventative treatment they can do going forward.  In addition, discussing whether he has any new symptoms today.  Determined that there is really no new symptoms at this has been residual since his discharge.  They did see the vascular surgeon Dr. Conde yesterday and both of them came away from that visit feeling like he was told \"you have a 90% blockage and there is nothing I can do about that\".  The patient was counseled on his smoking and that being a significant risk factor for both his current disease and any progression.  He was also counseled on exercise which he was not sure he was allowed to do and there should be no reason he cannot begin that type of

## 2024-10-18 NOTE — ED NOTES
Pt received discharge paperwork and follow up appointments. Pt verbalized understanding and had no questions or concerns at this time. Pt IV was removed, catheter intact and dressing applied. Pt A/Ox4, VSS, ABC intact, and no sxs of acute distress. Pt ambulated with no difficulty to home.

## 2024-10-18 NOTE — ED TRIAGE NOTES
Pt arrived to triage via private vehicle.  Pt c/o right hand numbness.  Pt states he had a stroke last month.  Pt states over the last week his balance has been getting worse.  Per wife she had noticed him slurring his speech lately.

## 2024-10-21 LAB
EKG ATRIAL RATE: 83 BPM
EKG P AXIS: 57 DEGREES
EKG P-R INTERVAL: 148 MS
EKG Q-T INTERVAL: 378 MS
EKG QRS DURATION: 84 MS
EKG QTC CALCULATION (BAZETT): 444 MS
EKG R AXIS: 54 DEGREES
EKG T AXIS: 67 DEGREES
EKG VENTRICULAR RATE: 83 BPM

## 2024-11-22 ENCOUNTER — HOSPITAL ENCOUNTER (EMERGENCY)
Age: 49
Discharge: HOME | End: 2024-11-22
Payer: COMMERCIAL

## 2024-11-22 ENCOUNTER — APPOINTMENT (OUTPATIENT)
Dept: GENERAL RADIOLOGY | Age: 49
End: 2024-11-22
Payer: COMMERCIAL

## 2024-11-22 VITALS
SYSTOLIC BLOOD PRESSURE: 97 MMHG | HEART RATE: 71 BPM | DIASTOLIC BLOOD PRESSURE: 67 MMHG | TEMPERATURE: 98.7 F | HEIGHT: 69 IN | BODY MASS INDEX: 26.22 KG/M2 | RESPIRATION RATE: 16 BRPM | WEIGHT: 177 LBS | OXYGEN SATURATION: 94 %

## 2024-11-22 DIAGNOSIS — F14.10 COCAINE ABUSE (HCC): Primary | ICD-10-CM

## 2024-11-22 DIAGNOSIS — R07.9 CHEST PAIN, UNSPECIFIED TYPE: ICD-10-CM

## 2024-11-22 LAB
ALBUMIN SERPL-MCNC: 4.8 G/DL (ref 3.5–4.6)
ALP SERPL-CCNC: 90 U/L (ref 35–104)
ALT SERPL-CCNC: 16 U/L (ref 0–41)
AMPHET UR QL SCN: ABNORMAL
ANION GAP SERPL CALCULATED.3IONS-SCNC: 10 MEQ/L (ref 9–15)
AST SERPL-CCNC: 17 U/L (ref 0–40)
BARBITURATES UR QL SCN: ABNORMAL
BASOPHILS # BLD: 0.1 K/UL (ref 0–0.2)
BASOPHILS NFR BLD: 0.8 %
BENZODIAZ UR QL SCN: ABNORMAL
BILIRUB SERPL-MCNC: <0.2 MG/DL (ref 0.2–0.7)
BUN SERPL-MCNC: 10 MG/DL (ref 6–20)
CALCIUM SERPL-MCNC: 9.5 MG/DL (ref 8.5–9.9)
CANNABINOIDS UR QL SCN: ABNORMAL
CHLORIDE SERPL-SCNC: 103 MEQ/L (ref 95–107)
CO2 SERPL-SCNC: 26 MEQ/L (ref 20–31)
COCAINE UR QL SCN: POSITIVE
CREAT SERPL-MCNC: 0.8 MG/DL (ref 0.7–1.2)
DRUG SCREEN COMMENT UR-IMP: ABNORMAL
EKG ATRIAL RATE: 91 BPM
EKG P AXIS: 63 DEGREES
EKG P-R INTERVAL: 162 MS
EKG Q-T INTERVAL: 360 MS
EKG QRS DURATION: 90 MS
EKG QTC CALCULATION (BAZETT): 442 MS
EKG R AXIS: 68 DEGREES
EKG T AXIS: 64 DEGREES
EKG VENTRICULAR RATE: 91 BPM
EOSINOPHIL # BLD: 0.1 K/UL (ref 0–0.7)
EOSINOPHIL NFR BLD: 1.1 %
ERYTHROCYTE [DISTWIDTH] IN BLOOD BY AUTOMATED COUNT: 12.4 % (ref 11.5–14.5)
ETHANOL PERCENT: 0.22 G/DL
ETHANOLAMINE SERPL-MCNC: 255 MG/DL (ref 0–0.08)
FENTANYL SCREEN, URINE: ABNORMAL
GLOBULIN SER CALC-MCNC: 3.2 G/DL (ref 2.3–3.5)
GLUCOSE SERPL-MCNC: 114 MG/DL (ref 70–99)
HCT VFR BLD AUTO: 47.3 % (ref 42–52)
HGB BLD-MCNC: 16.4 G/DL (ref 14–18)
LYMPHOCYTES # BLD: 3.3 K/UL (ref 1–4.8)
LYMPHOCYTES NFR BLD: 38.9 %
MAGNESIUM SERPL-MCNC: 2.3 MG/DL (ref 1.7–2.4)
MCH RBC QN AUTO: 31.1 PG (ref 27–31.3)
MCHC RBC AUTO-ENTMCNC: 34.7 % (ref 33–37)
MCV RBC AUTO: 89.8 FL (ref 79–92.2)
METHADONE UR QL SCN: ABNORMAL
MONOCYTES # BLD: 0.5 K/UL (ref 0.2–0.8)
MONOCYTES NFR BLD: 5.3 %
NEUTROPHILS # BLD: 4.5 K/UL (ref 1.4–6.5)
NEUTS SEG NFR BLD: 53.7 %
OPIATES UR QL SCN: ABNORMAL
OXYCODONE UR QL SCN: ABNORMAL
PCP UR QL SCN: ABNORMAL
PLATELET # BLD AUTO: 277 K/UL (ref 130–400)
POTASSIUM SERPL-SCNC: 4.3 MEQ/L (ref 3.4–4.9)
PROPOXYPH UR QL SCN: ABNORMAL
PROT SERPL-MCNC: 8 G/DL (ref 6.3–8)
RBC # BLD AUTO: 5.27 M/UL (ref 4.7–6.1)
SODIUM SERPL-SCNC: 139 MEQ/L (ref 135–144)
TROPONIN, HIGH SENSITIVITY: 7 NG/L (ref 0–19)
WBC # BLD AUTO: 8.4 K/UL (ref 4.8–10.8)

## 2024-11-22 PROCEDURE — 6370000000 HC RX 637 (ALT 250 FOR IP)

## 2024-11-22 PROCEDURE — 82077 ASSAY SPEC XCP UR&BREATH IA: CPT

## 2024-11-22 PROCEDURE — 80053 COMPREHEN METABOLIC PANEL: CPT

## 2024-11-22 PROCEDURE — 84484 ASSAY OF TROPONIN QUANT: CPT

## 2024-11-22 PROCEDURE — 85025 COMPLETE CBC W/AUTO DIFF WBC: CPT

## 2024-11-22 PROCEDURE — 83735 ASSAY OF MAGNESIUM: CPT

## 2024-11-22 PROCEDURE — 93005 ELECTROCARDIOGRAM TRACING: CPT

## 2024-11-22 PROCEDURE — 93010 ELECTROCARDIOGRAM REPORT: CPT | Performed by: INTERNAL MEDICINE

## 2024-11-22 PROCEDURE — 80307 DRUG TEST PRSMV CHEM ANLYZR: CPT

## 2024-11-22 PROCEDURE — 99285 EMERGENCY DEPT VISIT HI MDM: CPT

## 2024-11-22 PROCEDURE — 71046 X-RAY EXAM CHEST 2 VIEWS: CPT

## 2024-11-22 PROCEDURE — 36415 COLL VENOUS BLD VENIPUNCTURE: CPT

## 2024-11-22 RX ORDER — ASPIRIN 81 MG/1
324 TABLET, CHEWABLE ORAL ONCE
Status: COMPLETED | OUTPATIENT
Start: 2024-11-22 | End: 2024-11-22

## 2024-11-22 RX ORDER — LORAZEPAM 1 MG/1
1 TABLET ORAL ONCE
Status: COMPLETED | OUTPATIENT
Start: 2024-11-22 | End: 2024-11-22

## 2024-11-22 RX ADMIN — LORAZEPAM 1 MG: 1 TABLET ORAL at 05:08

## 2024-11-22 RX ADMIN — ASPIRIN 324 MG: 81 TABLET, CHEWABLE ORAL at 02:47

## 2024-11-22 ASSESSMENT — PAIN - FUNCTIONAL ASSESSMENT: PAIN_FUNCTIONAL_ASSESSMENT: 0-10

## 2024-11-22 ASSESSMENT — PAIN SCALES - GENERAL: PAINLEVEL_OUTOF10: 7

## 2024-11-22 ASSESSMENT — PAIN DESCRIPTION - LOCATION: LOCATION: CHEST

## 2024-11-22 ASSESSMENT — LIFESTYLE VARIABLES
HOW MANY STANDARD DRINKS CONTAINING ALCOHOL DO YOU HAVE ON A TYPICAL DAY: 3 OR 4
HOW OFTEN DO YOU HAVE A DRINK CONTAINING ALCOHOL: 4 OR MORE TIMES A WEEK

## 2024-11-22 ASSESSMENT — PAIN DESCRIPTION - DESCRIPTORS: DESCRIPTORS: ACHING

## 2024-11-22 NOTE — ED PROVIDER NOTES
x-ray demonstrates no acute cardiopulmonary process.  Initial troponin is 7.  Patient's urine returns positive for cocaine, but no other abnormalities noted in labs.  Patient was administered Ativan.  Patient upon reevaluation is sleeping, and upon awakening states that he feels much better.  Patient was offered cardiac observation at this time, but shared decision making has been utilized, and patient be discharged.  Patient's wife is at bedside as well.  All questions answered, and patient is discharged at this time with close follow-up with his primary care physician. Patient asked to return to the emergency department if their symptoms worsen. Patient verbalized that they are agreeable and understandable to the plan. The results of pertinent diagnostic studies and exam findings were discussed with the patient. The patient's provisional diagnosis was discussed. The risks of medications administered and prescribed were discussed. Symptomatic management was discussed.  Anticipatory guidance was given. Explicit return precautions were discussed. Educational materials were provided. Plan of care was discussed. The patient was provided with written instructions and appropriate follow-up information. The patient understands their need and responsibility to obtain additional follow-up as instructed. The patient was told to follow up with their PCP in 1-2 days and return to the emergency department if symptoms persist, worsen, or become concerning. All questions were answered. The patient expressed understanding and agreement of the plan.       Procedures        CRITICAL CARE TIME   Total Critical Care time was 0 minutes, excluding separately reportable procedures.  There was a high probability of clinically significant/life threatening deterioration in the patient's condition which required my urgent intervention.       FINAL IMPRESSION      1. Cocaine abuse (HCC)    2. Chest pain, unspecified type

## 2024-11-22 NOTE — ED TRIAGE NOTES
Patient arrived to ER by private vehicle with girlfriend.   Patient c/o chest pain, started 1 week ago.   Patient states pain has gotten worse today.

## 2024-12-18 PROBLEM — S39.012A STRAIN OF LUMBAR PARASPINAL MUSCLE: Status: ACTIVE | Noted: 2020-04-24

## 2024-12-18 PROBLEM — S43.429A SPRAIN OF ROTATOR CUFF CAPSULE: Status: ACTIVE | Noted: 2024-12-18

## 2024-12-18 PROBLEM — M65.4 RADIAL STYLOID TENOSYNOVITIS: Status: ACTIVE | Noted: 2024-12-18

## 2024-12-18 PROBLEM — Z78.9 ALCOHOL USE: Status: ACTIVE | Noted: 2024-04-15

## 2024-12-18 PROBLEM — M79.645 PAIN OF LEFT THUMB: Status: ACTIVE | Noted: 2024-06-24

## 2024-12-18 PROBLEM — R20.0 LEG NUMBNESS: Status: ACTIVE | Noted: 2020-04-24

## 2024-12-18 PROBLEM — I65.29 CAROTID STENOSIS: Status: ACTIVE | Noted: 2024-02-12

## 2024-12-18 PROBLEM — M67.919 DISORDER OF ROTATOR CUFF: Status: ACTIVE | Noted: 2024-12-18

## 2024-12-18 PROBLEM — I65.21 RIGHT CAROTID ARTERY OCCLUSION: Status: ACTIVE | Noted: 2024-10-16

## 2024-12-18 PROBLEM — S43.109A DISLOCATION OF ACROMIOCLAVICULAR JOINT: Status: ACTIVE | Noted: 2024-12-18

## 2024-12-18 PROBLEM — M79.643 PAIN OF HAND: Status: ACTIVE | Noted: 2024-12-18

## 2024-12-18 PROBLEM — M25.551 PAIN IN RIGHT HIP: Status: ACTIVE | Noted: 2020-04-24

## 2024-12-18 PROBLEM — M25.649 THUMB JOINT STIFFNESS: Status: ACTIVE | Noted: 2024-06-24

## 2024-12-18 PROBLEM — M19.90 DJD (DEGENERATIVE JOINT DISEASE): Status: ACTIVE | Noted: 2023-11-06

## 2024-12-18 PROBLEM — S36.039A LACERATION OF SPLEEN: Status: ACTIVE | Noted: 2021-07-11

## 2024-12-18 PROBLEM — M25.60 LIMITED JOINT RANGE OF MOTION: Status: ACTIVE | Noted: 2024-06-24

## 2024-12-18 PROBLEM — Z98.890 POSTOPERATIVE STATE: Status: ACTIVE | Noted: 2024-05-13

## 2025-04-03 ENCOUNTER — APPOINTMENT (OUTPATIENT)
Dept: RADIOLOGY | Facility: HOSPITAL | Age: 50
End: 2025-04-03
Payer: COMMERCIAL

## 2025-04-03 ENCOUNTER — HOSPITAL ENCOUNTER (EMERGENCY)
Facility: HOSPITAL | Age: 50
Discharge: HOME | End: 2025-04-03
Payer: COMMERCIAL

## 2025-04-03 VITALS
TEMPERATURE: 97.7 F | OXYGEN SATURATION: 98 % | BODY MASS INDEX: 26.66 KG/M2 | WEIGHT: 180 LBS | HEIGHT: 69 IN | RESPIRATION RATE: 18 BRPM | HEART RATE: 82 BPM | DIASTOLIC BLOOD PRESSURE: 86 MMHG | SYSTOLIC BLOOD PRESSURE: 178 MMHG

## 2025-04-03 DIAGNOSIS — M25.532 LEFT WRIST PAIN: Primary | ICD-10-CM

## 2025-04-03 PROCEDURE — 73110 X-RAY EXAM OF WRIST: CPT | Mod: LEFT SIDE | Performed by: RADIOLOGY

## 2025-04-03 PROCEDURE — 99283 EMERGENCY DEPT VISIT LOW MDM: CPT

## 2025-04-03 PROCEDURE — 73110 X-RAY EXAM OF WRIST: CPT | Mod: LT

## 2025-04-03 PROCEDURE — 2500000001 HC RX 250 WO HCPCS SELF ADMINISTERED DRUGS (ALT 637 FOR MEDICARE OP): Performed by: PHYSICIAN ASSISTANT

## 2025-04-03 RX ORDER — HYDROCODONE BITARTRATE AND ACETAMINOPHEN 5; 325 MG/1; MG/1
1 TABLET ORAL ONCE
Status: COMPLETED | OUTPATIENT
Start: 2025-04-03 | End: 2025-04-03

## 2025-04-03 RX ADMIN — HYDROCODONE BITARTRATE AND ACETAMINOPHEN 1 TABLET: 5; 325 TABLET ORAL at 11:39

## 2025-04-03 ASSESSMENT — PAIN DESCRIPTION - ORIENTATION: ORIENTATION: RIGHT

## 2025-04-03 ASSESSMENT — PAIN SCALES - GENERAL
PAINLEVEL_OUTOF10: 3
PAINLEVEL_OUTOF10: 10 - WORST POSSIBLE PAIN

## 2025-04-03 ASSESSMENT — PAIN DESCRIPTION - PROGRESSION: CLINICAL_PROGRESSION: GRADUALLY IMPROVING

## 2025-04-03 ASSESSMENT — PAIN DESCRIPTION - FREQUENCY: FREQUENCY: CONSTANT/CONTINUOUS

## 2025-04-03 ASSESSMENT — PAIN DESCRIPTION - ONSET: ONSET: SUDDEN

## 2025-04-03 ASSESSMENT — COLUMBIA-SUICIDE SEVERITY RATING SCALE - C-SSRS
6. HAVE YOU EVER DONE ANYTHING, STARTED TO DO ANYTHING, OR PREPARED TO DO ANYTHING TO END YOUR LIFE?: NO
2. HAVE YOU ACTUALLY HAD ANY THOUGHTS OF KILLING YOURSELF?: NO
1. IN THE PAST MONTH, HAVE YOU WISHED YOU WERE DEAD OR WISHED YOU COULD GO TO SLEEP AND NOT WAKE UP?: NO

## 2025-04-03 ASSESSMENT — LIFESTYLE VARIABLES
HAVE YOU EVER FELT YOU SHOULD CUT DOWN ON YOUR DRINKING: NO
EVER HAD A DRINK FIRST THING IN THE MORNING TO STEADY YOUR NERVES TO GET RID OF A HANGOVER: NO
HAVE PEOPLE ANNOYED YOU BY CRITICIZING YOUR DRINKING: NO
EVER FELT BAD OR GUILTY ABOUT YOUR DRINKING: NO
TOTAL SCORE: 0

## 2025-04-03 ASSESSMENT — PAIN DESCRIPTION - PAIN TYPE: TYPE: ACUTE PAIN

## 2025-04-03 ASSESSMENT — PAIN DESCRIPTION - LOCATION
LOCATION: WRIST
LOCATION: WRIST

## 2025-04-03 ASSESSMENT — PAIN - FUNCTIONAL ASSESSMENT
PAIN_FUNCTIONAL_ASSESSMENT: 0-10
PAIN_FUNCTIONAL_ASSESSMENT: 0-10

## 2025-04-03 ASSESSMENT — PAIN DESCRIPTION - DESCRIPTORS: DESCRIPTORS: ACHING

## 2025-04-03 NOTE — Clinical Note
Joaquin Fgiueredo was seen and treated in our emergency department on 4/3/2025.  He may return to work on 04/04/2025.       If you have any questions or concerns, please don't hesitate to call.      Radha Carrillo PA-C
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
## (undated) DEVICE — SUTURE MCRYL SZ 4-0 L27IN ABSRB UD L19MM PS-2 1/2 CIR PRIM Y426H

## (undated) DEVICE — PADDING UNDERCAST W4INXL12FT RAYON POLY SYN NONADHESIVE

## (undated) DEVICE — BANDAGE COMPR W4INXL5YD WHT BGE POLY COT M E WRP WV HK AND

## (undated) DEVICE — DRESSING GZ W1XL8IN COT XRFRM N ADH OVERWRAP CURAD

## (undated) DEVICE — GLOVE ORANGE PI 8   MSG9080

## (undated) DEVICE — BANDAGE,ELASTIC,ESMARK,STERILE,4"X9',LF: Brand: MEDLINE

## (undated) DEVICE — SPLINT CAST W3XL15IN GRN STRENGTH PLSTR OF PARIS FAST SET

## (undated) DEVICE — ZIMMER® STERILE DISPOSABLE TOURNIQUET CUFF, DUAL PORT, SINGLE BLADDER, 18 IN. (46 CM)

## (undated) DEVICE — SUTURE ETHLN SZ 5-0 L18IN NONABSORBABLE BLK L16MM PS-3 3/8 1668G

## (undated) DEVICE — BANDAGE COMPR W2INXL5YD WHT BGE POLY COT M E WRP WV HK AND

## (undated) DEVICE — SUTURE VCRL SZ 3-0 L18IN ABSRB UD PS-2 L19MM 3/8 CRV PRIM J497H

## (undated) DEVICE — COTTON UNDERCAST PADDING,REGULAR FINISH: Brand: WEBRIL

## (undated) DEVICE — GAUZE,SPONGE,FLUFF,6"X6.75",STRL,10/TRAY: Brand: MEDLINE

## (undated) DEVICE — SUTURE FIBERWIRE 3-0 L18IN NONABSORBABLE BLU L15MM 3/8 CIR AR722701

## (undated) DEVICE — 1010 S-DRAPE TOWEL DRAPE 10/BX: Brand: STERI-DRAPE™

## (undated) DEVICE — CORD,CAUTERY,BIPOLAR,STERILE: Brand: MEDLINE

## (undated) DEVICE — Device

## (undated) DEVICE — APPLICATOR MEDICATED 26 CC SOLUTION HI LT ORNG CHLORAPREP

## (undated) DEVICE — GOWN,AURORA,NONREINFORCED,LARGE: Brand: MEDLINE

## (undated) DEVICE — SUTURE NONABSORBABLE MONOFILAMENT 5-0 PS-2 18 IN BLK ETHILON 1666H

## (undated) DEVICE — HAND II: Brand: MEDLINE INDUSTRIES, INC.